# Patient Record
Sex: FEMALE | Race: WHITE | Employment: FULL TIME | URBAN - NONMETROPOLITAN AREA
[De-identification: names, ages, dates, MRNs, and addresses within clinical notes are randomized per-mention and may not be internally consistent; named-entity substitution may affect disease eponyms.]

---

## 2017-01-31 LAB
EXTERNAL ABO GROUPING: NORMAL
EXTERNAL RH FACTOR: POSITIVE

## 2017-02-06 LAB
EXTERNAL ANTIBODY SCREEN: NEGATIVE
EXTERNAL HEPATITIS B SURFACE ANTIGEN: NEGATIVE
EXTERNAL HERPES PCR: NORMAL
EXTERNAL RUBELLA QUALITATIVE: NORMAL
HIV1 AB SPEC QL IA.RAPID: NEGATIVE

## 2017-05-22 ENCOUNTER — OFFICE VISIT (OUTPATIENT)
Dept: UROLOGY | Age: 27
End: 2017-05-22
Payer: COMMERCIAL

## 2017-05-22 VITALS
HEART RATE: 103 BPM | HEIGHT: 67 IN | BODY MASS INDEX: 30.13 KG/M2 | TEMPERATURE: 97.9 F | OXYGEN SATURATION: 100 % | WEIGHT: 192 LBS | SYSTOLIC BLOOD PRESSURE: 124 MMHG | DIASTOLIC BLOOD PRESSURE: 64 MMHG

## 2017-05-22 DIAGNOSIS — N20.0 KIDNEY STONE: Primary | ICD-10-CM

## 2017-05-22 LAB
BILIRUBIN, POC: NORMAL
BLOOD URINE, POC: NORMAL
CLARITY, POC: CLEAR
COLOR, POC: YELLOW
GLUCOSE URINE, POC: NORMAL
KETONES, POC: NORMAL
LEUKOCYTE EST, POC: NORMAL
NITRITE, POC: NORMAL
PH, POC: 7
PROTEIN, POC: NORMAL
SPECIFIC GRAVITY, POC: 1.02
UROBILINOGEN, POC: 1

## 2017-05-22 PROCEDURE — 81002 URINALYSIS NONAUTO W/O SCOPE: CPT | Performed by: UROLOGY

## 2017-05-22 PROCEDURE — 99213 OFFICE O/P EST LOW 20 MIN: CPT | Performed by: UROLOGY

## 2017-05-22 ASSESSMENT — ENCOUNTER SYMPTOMS
DOUBLE VISION: 0
VOMITING: 0
BACK PAIN: 0
COUGH: 0
BLURRED VISION: 0
ABDOMINAL PAIN: 0
HEMOPTYSIS: 0
NAUSEA: 0

## 2017-07-09 ENCOUNTER — HOSPITAL ENCOUNTER (OUTPATIENT)
Facility: HOSPITAL | Age: 27
Discharge: HOME | End: 2017-07-09
Attending: OBSTETRICS & GYNECOLOGY | Admitting: OBSTETRICS & GYNECOLOGY

## 2017-07-09 DIAGNOSIS — R69 DIAGNOSIS UNKNOWN: ICD-10-CM

## 2017-07-09 PROCEDURE — G0378 HOSPITAL OBSERVATION PER HR: HCPCS

## 2017-07-09 PROCEDURE — 96374 THER/PROPH/DIAG INJ IV PUSH: CPT

## 2017-07-09 PROCEDURE — 25010000002 FERUMOXYTOL 510 MG/17ML SOLUTION 510 MG VIAL: Performed by: OBSTETRICS & GYNECOLOGY

## 2017-07-09 RX ORDER — DIPHENHYDRAMINE HCL 25 MG
25 CAPSULE ORAL ONCE
Status: CANCELLED | OUTPATIENT
Start: 2017-07-09

## 2017-07-09 RX ORDER — SODIUM CHLORIDE 9 MG/ML
250 INJECTION, SOLUTION INTRAVENOUS ONCE
Status: CANCELLED | OUTPATIENT
Start: 2017-07-09

## 2017-07-09 RX ORDER — DIPHENHYDRAMINE HCL 25 MG
25 CAPSULE ORAL ONCE
Status: DISCONTINUED | OUTPATIENT
Start: 2017-07-09 | End: 2017-07-09 | Stop reason: HOSPADM

## 2017-07-09 RX ORDER — SODIUM CHLORIDE 9 MG/ML
250 INJECTION, SOLUTION INTRAVENOUS ONCE
Status: DISCONTINUED | OUTPATIENT
Start: 2017-07-09 | End: 2017-07-09 | Stop reason: HOSPADM

## 2017-07-09 RX ADMIN — FERUMOXYTOL 510 MG: 510 INJECTION INTRAVENOUS at 16:20

## 2017-07-12 ENCOUNTER — HOSPITAL ENCOUNTER (OUTPATIENT)
Facility: HOSPITAL | Age: 27
Discharge: HOME | End: 2017-07-12
Attending: OBSTETRICS & GYNECOLOGY | Admitting: OBSTETRICS & GYNECOLOGY

## 2017-07-12 PROCEDURE — 25010000002 FERUMOXYTOL 510 MG/17ML SOLUTION 510 MG VIAL: Performed by: OBSTETRICS & GYNECOLOGY

## 2017-07-12 PROCEDURE — G0378 HOSPITAL OBSERVATION PER HR: HCPCS

## 2017-07-12 PROCEDURE — 96374 THER/PROPH/DIAG INJ IV PUSH: CPT

## 2017-07-12 RX ORDER — PRENATAL VIT NO.126/IRON/FOLIC 28MG-0.8MG
1 TABLET ORAL DAILY
COMMUNITY
End: 2022-05-31 | Stop reason: ALTCHOICE

## 2017-07-12 RX ORDER — FERUMOXYTOL 510 MG/17ML
510 INJECTION INTRAVENOUS ONCE
Status: DISCONTINUED | OUTPATIENT
Start: 2017-07-12 | End: 2017-07-12

## 2017-07-12 RX ORDER — DIPHENHYDRAMINE HCL 25 MG
25 CAPSULE ORAL ONCE
Status: CANCELLED | OUTPATIENT
Start: 2017-07-16

## 2017-07-12 RX ORDER — SODIUM CHLORIDE 9 MG/ML
250 INJECTION, SOLUTION INTRAVENOUS ONCE
Status: CANCELLED | OUTPATIENT
Start: 2017-07-16

## 2017-07-12 RX ADMIN — FERUMOXYTOL 510 MG: 510 INJECTION INTRAVENOUS at 15:30

## 2017-07-13 PROBLEM — D64.9 ANEMIA: Status: ACTIVE | Noted: 2017-07-13

## 2017-08-07 ENCOUNTER — HOSPITAL ENCOUNTER (OUTPATIENT)
Facility: HOSPITAL | Age: 27
Setting detail: OBSERVATION
Discharge: HOME | End: 2017-08-07
Attending: OBSTETRICS & GYNECOLOGY | Admitting: OBSTETRICS & GYNECOLOGY

## 2017-08-07 VITALS
SYSTOLIC BLOOD PRESSURE: 128 MMHG | DIASTOLIC BLOOD PRESSURE: 61 MMHG | RESPIRATION RATE: 14 BRPM | HEART RATE: 75 BPM | TEMPERATURE: 97.2 F

## 2017-08-07 PROBLEM — O36.8390 NON-REASSURING ELECTRONIC FETAL MONITORING TRACING: Status: ACTIVE | Noted: 2017-08-07

## 2017-08-07 LAB — FIBRONECTIN FETAL VAG QL: NEGATIVE

## 2017-08-07 PROCEDURE — 82731 ASSAY OF FETAL FIBRONECTIN: CPT | Performed by: OBSTETRICS & GYNECOLOGY

## 2017-08-07 PROCEDURE — G0378 HOSPITAL OBSERVATION PER HR: HCPCS

## 2017-08-07 PROCEDURE — G0463 HOSPITAL OUTPT CLINIC VISIT: HCPCS | Performed by: ADVANCED PRACTICE MIDWIFE

## 2017-08-07 RX ORDER — OMEPRAZOLE 20 MG/1
20 CAPSULE, DELAYED RELEASE ORAL DAILY
COMMUNITY
End: 2022-05-31

## 2017-08-07 RX ORDER — ACETAMINOPHEN 325 MG/1
650 TABLET ORAL EVERY 4 HOURS PRN
Status: CANCELLED | OUTPATIENT
Start: 2017-08-07

## 2017-08-07 RX ORDER — ACETAMINOPHEN 325 MG/1
650 TABLET ORAL EVERY 4 HOURS PRN
Status: DISCONTINUED | OUTPATIENT
Start: 2017-08-07 | End: 2017-08-07 | Stop reason: HOSPADM

## 2017-08-07 RX ORDER — FERROUS SULFATE 325(65) MG
325 TABLET ORAL
COMMUNITY
End: 2022-07-29

## 2017-08-24 LAB — EXTERNAL GROUP B STREP ANTIGEN: NEGATIVE

## 2017-09-22 ENCOUNTER — HOSPITAL ENCOUNTER (OUTPATIENT)
Facility: HOSPITAL | Age: 27
Setting detail: OBSERVATION
Discharge: HOME | End: 2017-09-22
Attending: OBSTETRICS & GYNECOLOGY | Admitting: OBSTETRICS & GYNECOLOGY

## 2017-09-22 VITALS
DIASTOLIC BLOOD PRESSURE: 88 MMHG | OXYGEN SATURATION: 99 % | WEIGHT: 209.2 LBS | HEART RATE: 96 BPM | RESPIRATION RATE: 18 BRPM | TEMPERATURE: 99.1 F | HEIGHT: 67 IN | SYSTOLIC BLOOD PRESSURE: 136 MMHG | BODY MASS INDEX: 32.83 KG/M2

## 2017-09-22 PROCEDURE — G0378 HOSPITAL OBSERVATION PER HR: HCPCS

## 2017-09-25 ENCOUNTER — HOSPITAL ENCOUNTER (OUTPATIENT)
Facility: HOSPITAL | Age: 27
Setting detail: OBSERVATION
Discharge: HOME | End: 2017-09-25
Attending: OBSTETRICS & GYNECOLOGY | Admitting: OBSTETRICS & GYNECOLOGY

## 2017-09-25 VITALS
HEIGHT: 67 IN | BODY MASS INDEX: 32.72 KG/M2 | TEMPERATURE: 99.5 F | DIASTOLIC BLOOD PRESSURE: 76 MMHG | RESPIRATION RATE: 18 BRPM | HEART RATE: 98 BPM | WEIGHT: 208.5 LBS | SYSTOLIC BLOOD PRESSURE: 129 MMHG

## 2017-09-25 PROBLEM — Z34.90 PREGNANCY: Status: ACTIVE | Noted: 2017-09-25

## 2017-09-25 LAB — A1 MICROGLOB PLACENTAL VAG QL: NEGATIVE

## 2017-09-25 PROCEDURE — G0378 HOSPITAL OBSERVATION PER HR: HCPCS

## 2017-09-25 PROCEDURE — 84112 EVAL AMNIOTIC FLUID PROTEIN: CPT | Performed by: OBSTETRICS & GYNECOLOGY

## 2017-09-25 PROCEDURE — G0463 HOSPITAL OUTPT CLINIC VISIT: HCPCS

## 2017-09-25 RX ORDER — VALACYCLOVIR HYDROCHLORIDE 500 MG/1
500 TABLET, FILM COATED ORAL 2 TIMES DAILY
Status: ON HOLD | COMMUNITY
End: 2017-10-02

## 2017-09-29 RX ORDER — SODIUM CHLORIDE 0.9 % (FLUSH) 0.9 %
1-10 SYRINGE (ML) INJECTION AS NEEDED
Status: CANCELLED | OUTPATIENT
Start: 2017-10-02

## 2017-09-29 RX ORDER — PROMETHAZINE HYDROCHLORIDE 25 MG/1
12.5 TABLET ORAL EVERY 6 HOURS PRN
Status: CANCELLED | OUTPATIENT
Start: 2017-10-02

## 2017-09-29 RX ORDER — PROMETHAZINE HYDROCHLORIDE 12.5 MG/1
12.5 SUPPOSITORY RECTAL EVERY 6 HOURS PRN
Status: CANCELLED | OUTPATIENT
Start: 2017-10-02

## 2017-09-29 RX ORDER — IBUPROFEN 800 MG/1
800 TABLET ORAL EVERY 8 HOURS PRN
Status: CANCELLED | OUTPATIENT
Start: 2017-10-02

## 2017-09-29 RX ORDER — CARBOPROST TROMETHAMINE 250 UG/ML
250 INJECTION, SOLUTION INTRAMUSCULAR AS NEEDED
Status: CANCELLED | OUTPATIENT
Start: 2017-10-02

## 2017-09-29 RX ORDER — MISOPROSTOL 200 UG/1
800 TABLET ORAL AS NEEDED
Status: CANCELLED | OUTPATIENT
Start: 2017-10-02

## 2017-09-29 RX ORDER — PROMETHAZINE HYDROCHLORIDE 25 MG/ML
12.5 INJECTION, SOLUTION INTRAMUSCULAR; INTRAVENOUS EVERY 6 HOURS PRN
Status: CANCELLED | OUTPATIENT
Start: 2017-10-02

## 2017-09-29 RX ORDER — METHYLERGONOVINE MALEATE 0.2 MG/ML
200 INJECTION INTRAVENOUS ONCE AS NEEDED
Status: CANCELLED | OUTPATIENT
Start: 2017-10-02

## 2017-09-29 RX ORDER — ACETAMINOPHEN 325 MG/1
650 TABLET ORAL EVERY 4 HOURS PRN
Status: CANCELLED | OUTPATIENT
Start: 2017-10-02

## 2017-09-29 RX ORDER — TERBUTALINE SULFATE 1 MG/ML
0.25 INJECTION, SOLUTION SUBCUTANEOUS AS NEEDED
Status: CANCELLED | OUTPATIENT
Start: 2017-10-02

## 2017-10-02 ENCOUNTER — ANESTHESIA EVENT (OUTPATIENT)
Dept: LABOR AND DELIVERY | Facility: HOSPITAL | Age: 27
End: 2017-10-02

## 2017-10-02 ENCOUNTER — ANESTHESIA (OUTPATIENT)
Dept: LABOR AND DELIVERY | Facility: HOSPITAL | Age: 27
End: 2017-10-02

## 2017-10-02 ENCOUNTER — HOSPITAL ENCOUNTER (OUTPATIENT)
Dept: LABOR AND DELIVERY | Facility: HOSPITAL | Age: 27
Discharge: HOME | End: 2017-10-02

## 2017-10-02 ENCOUNTER — HOSPITAL ENCOUNTER (INPATIENT)
Facility: HOSPITAL | Age: 27
LOS: 2 days | Discharge: HOME | End: 2017-10-04
Attending: OBSTETRICS & GYNECOLOGY | Admitting: OBSTETRICS & GYNECOLOGY

## 2017-10-02 PROBLEM — R69 DIAGNOSIS UNKNOWN: Status: RESOLVED | Noted: 2017-07-09 | Resolved: 2017-10-02

## 2017-10-02 PROBLEM — Z34.90 PREGNANCY: Status: RESOLVED | Noted: 2017-09-25 | Resolved: 2017-10-02

## 2017-10-02 PROBLEM — Z37.9 NORMAL LABOR: Status: ACTIVE | Noted: 2017-10-02

## 2017-10-02 PROBLEM — Z37.9 NORMAL LABOR: Status: RESOLVED | Noted: 2017-10-02 | Resolved: 2017-10-02

## 2017-10-02 PROBLEM — O36.8390 NON-REASSURING ELECTRONIC FETAL MONITORING TRACING: Status: RESOLVED | Noted: 2017-08-07 | Resolved: 2017-10-02

## 2017-10-02 PROBLEM — D64.9 ANEMIA: Status: RESOLVED | Noted: 2017-07-13 | Resolved: 2017-10-02

## 2017-10-02 LAB
ABO GROUP BLD: NORMAL
BLD GP AB SCN SERPL QL: NEGATIVE
DEPRECATED RDW RBC AUTO: 49.1 FL (ref 40–54)
EOSINOPHIL # BLD MANUAL: 0.23 10*3/MM3 (ref 0–0.7)
EOSINOPHIL NFR BLD MANUAL: 2 % (ref 0–4)
ERYTHROCYTE [DISTWIDTH] IN BLOOD BY AUTOMATED COUNT: 15.9 % (ref 12–15)
HCT VFR BLD AUTO: 37.5 % (ref 37–47)
HGB BLD-MCNC: 12.7 G/DL (ref 12–16)
LYMPHOCYTES # BLD MANUAL: 2.46 10*3/MM3 (ref 0.72–4.86)
LYMPHOCYTES NFR BLD MANUAL: 21 % (ref 15–45)
LYMPHOCYTES NFR BLD MANUAL: 5 % (ref 4–12)
MCH RBC QN AUTO: 29 PG (ref 28–32)
MCHC RBC AUTO-ENTMCNC: 33.9 G/DL (ref 33–36)
MCV RBC AUTO: 85.6 FL (ref 82–98)
MONOCYTES # BLD AUTO: 0.59 10*3/MM3 (ref 0.19–1.3)
NEUTROPHILS # BLD AUTO: 8.31 10*3/MM3 (ref 1.87–8.4)
NEUTROPHILS NFR BLD MANUAL: 68 % (ref 39–78)
NEUTS BAND NFR BLD MANUAL: 3 % (ref 0–10)
PLAT MORPH BLD: NORMAL
PLATELET # BLD AUTO: 210 10*3/MM3 (ref 130–400)
PMV BLD AUTO: 11.3 FL (ref 6–12)
RBC # BLD AUTO: 4.38 10*6/MM3 (ref 4.2–5.4)
RBC MORPH BLD: NORMAL
RH BLD: POSITIVE
VARIANT LYMPHS NFR BLD MANUAL: 1 % (ref 0–5)
WBC MORPH BLD: NORMAL
WBC NRBC COR # BLD: 11.71 10*3/MM3 (ref 4.8–10.8)

## 2017-10-02 PROCEDURE — 25010000002 METHYLERGONOVINE MALEATE PER 0.2 MG: Performed by: NURSE ANESTHETIST, CERTIFIED REGISTERED

## 2017-10-02 PROCEDURE — 85007 BL SMEAR W/DIFF WBC COUNT: CPT | Performed by: OBSTETRICS & GYNECOLOGY

## 2017-10-02 PROCEDURE — 25010000002 PROMETHAZINE PER 50 MG: Performed by: OBSTETRICS & GYNECOLOGY

## 2017-10-02 PROCEDURE — 25010000002 CEFEPIME: Performed by: OBSTETRICS & GYNECOLOGY

## 2017-10-02 PROCEDURE — 25010000002 SUCCINYLCHOLINE PER 20 MG: Performed by: NURSE ANESTHETIST, CERTIFIED REGISTERED

## 2017-10-02 PROCEDURE — 25010000003 CEFAZOLIN PER 500 MG: Performed by: OBSTETRICS & GYNECOLOGY

## 2017-10-02 PROCEDURE — 94799 UNLISTED PULMONARY SVC/PX: CPT

## 2017-10-02 PROCEDURE — 36415 COLL VENOUS BLD VENIPUNCTURE: CPT | Performed by: OBSTETRICS & GYNECOLOGY

## 2017-10-02 PROCEDURE — 25010000002 DEXAMETHASONE PER 1 MG: Performed by: NURSE ANESTHETIST, CERTIFIED REGISTERED

## 2017-10-02 PROCEDURE — 86900 BLOOD TYPING SEROLOGIC ABO: CPT | Performed by: OBSTETRICS & GYNECOLOGY

## 2017-10-02 PROCEDURE — 25010000002 MIDAZOLAM PER 1 MG: Performed by: NURSE ANESTHETIST, CERTIFIED REGISTERED

## 2017-10-02 PROCEDURE — 25010000002 MORPHINE PER 10 MG: Performed by: OBSTETRICS & GYNECOLOGY

## 2017-10-02 PROCEDURE — 25010000002 KETOROLAC TROMETHAMINE PER 15 MG: Performed by: OBSTETRICS & GYNECOLOGY

## 2017-10-02 PROCEDURE — 25010000002 FENTANYL CITRATE (PF) 250 MCG/5ML SOLUTION: Performed by: NURSE ANESTHETIST, CERTIFIED REGISTERED

## 2017-10-02 PROCEDURE — 86901 BLOOD TYPING SEROLOGIC RH(D): CPT | Performed by: OBSTETRICS & GYNECOLOGY

## 2017-10-02 PROCEDURE — 25010000002 HYDROMORPHONE PER 4 MG: Performed by: NURSE ANESTHETIST, CERTIFIED REGISTERED

## 2017-10-02 PROCEDURE — 51703 INSERT BLADDER CATH COMPLEX: CPT

## 2017-10-02 PROCEDURE — 88307 TISSUE EXAM BY PATHOLOGIST: CPT | Performed by: OBSTETRICS & GYNECOLOGY

## 2017-10-02 PROCEDURE — 86850 RBC ANTIBODY SCREEN: CPT | Performed by: OBSTETRICS & GYNECOLOGY

## 2017-10-02 PROCEDURE — 85027 COMPLETE CBC AUTOMATED: CPT | Performed by: OBSTETRICS & GYNECOLOGY

## 2017-10-02 PROCEDURE — 25010000002 PROPOFOL 10 MG/ML EMULSION: Performed by: NURSE ANESTHETIST, CERTIFIED REGISTERED

## 2017-10-02 PROCEDURE — 25010000002 ONDANSETRON PER 1 MG: Performed by: NURSE ANESTHETIST, CERTIFIED REGISTERED

## 2017-10-02 PROCEDURE — C1765 ADHESION BARRIER: HCPCS | Performed by: OBSTETRICS & GYNECOLOGY

## 2017-10-02 RX ORDER — OXYTOCIN/RINGER'S LACTATE 20/1000 ML
PLASTIC BAG, INJECTION (ML) INTRAVENOUS AS NEEDED
Status: DISCONTINUED | OUTPATIENT
Start: 2017-10-02 | End: 2017-10-02 | Stop reason: SURG

## 2017-10-02 RX ORDER — PROMETHAZINE HYDROCHLORIDE 25 MG/ML
12.5 INJECTION, SOLUTION INTRAMUSCULAR; INTRAVENOUS EVERY 6 HOURS PRN
Status: DISCONTINUED | OUTPATIENT
Start: 2017-10-02 | End: 2017-10-02 | Stop reason: HOSPADM

## 2017-10-02 RX ORDER — MORPHINE SULFATE 2 MG/ML
2 INJECTION, SOLUTION INTRAMUSCULAR; INTRAVENOUS
Status: DISCONTINUED | OUTPATIENT
Start: 2017-10-02 | End: 2017-10-02 | Stop reason: HOSPADM

## 2017-10-02 RX ORDER — FAMOTIDINE 20 MG/1
TABLET, FILM COATED ORAL
Status: DISPENSED
Start: 2017-10-02 | End: 2017-10-03

## 2017-10-02 RX ORDER — METHYLERGONOVINE MALEATE 0.2 MG/ML
200 INJECTION INTRAVENOUS ONCE AS NEEDED
Status: DISCONTINUED | OUTPATIENT
Start: 2017-10-02 | End: 2017-10-02 | Stop reason: SDUPTHER

## 2017-10-02 RX ORDER — ONDANSETRON 2 MG/ML
4 INJECTION INTRAMUSCULAR; INTRAVENOUS EVERY 6 HOURS PRN
Status: DISCONTINUED | OUTPATIENT
Start: 2017-10-02 | End: 2017-10-02 | Stop reason: HOSPADM

## 2017-10-02 RX ORDER — ONDANSETRON 4 MG/1
4 TABLET, ORALLY DISINTEGRATING ORAL EVERY 6 HOURS PRN
Status: DISCONTINUED | OUTPATIENT
Start: 2017-10-02 | End: 2017-10-04 | Stop reason: HOSPADM

## 2017-10-02 RX ORDER — ONDANSETRON 2 MG/ML
4 INJECTION INTRAMUSCULAR; INTRAVENOUS EVERY 6 HOURS PRN
Status: DISCONTINUED | OUTPATIENT
Start: 2017-10-02 | End: 2017-10-04 | Stop reason: HOSPADM

## 2017-10-02 RX ORDER — CARBOPROST TROMETHAMINE 250 UG/ML
250 INJECTION, SOLUTION INTRAMUSCULAR
Status: DISCONTINUED | OUTPATIENT
Start: 2017-10-02 | End: 2017-10-02 | Stop reason: SDUPTHER

## 2017-10-02 RX ORDER — ONDANSETRON 4 MG/1
4 TABLET, FILM COATED ORAL EVERY 6 HOURS PRN
Status: DISCONTINUED | OUTPATIENT
Start: 2017-10-02 | End: 2017-10-02 | Stop reason: HOSPADM

## 2017-10-02 RX ORDER — MISOPROSTOL 200 UG/1
800 TABLET ORAL AS NEEDED
Status: DISCONTINUED | OUTPATIENT
Start: 2017-10-02 | End: 2017-10-04 | Stop reason: HOSPADM

## 2017-10-02 RX ORDER — PROMETHAZINE HYDROCHLORIDE 12.5 MG/1
12.5 SUPPOSITORY RECTAL EVERY 6 HOURS PRN
Status: DISCONTINUED | OUTPATIENT
Start: 2017-10-02 | End: 2017-10-02 | Stop reason: HOSPADM

## 2017-10-02 RX ORDER — PROMETHAZINE HYDROCHLORIDE 12.5 MG/1
12.5 SUPPOSITORY RECTAL EVERY 6 HOURS PRN
Status: DISCONTINUED | OUTPATIENT
Start: 2017-10-02 | End: 2017-10-04 | Stop reason: HOSPADM

## 2017-10-02 RX ORDER — LIDOCAINE HYDROCHLORIDE 20 MG/ML
INJECTION, SOLUTION INFILTRATION; PERINEURAL AS NEEDED
Status: DISCONTINUED | OUTPATIENT
Start: 2017-10-02 | End: 2017-10-02 | Stop reason: SURG

## 2017-10-02 RX ORDER — METHYLERGONOVINE MALEATE 0.2 MG/ML
200 INJECTION INTRAVENOUS AS NEEDED
Status: DISCONTINUED | OUTPATIENT
Start: 2017-10-02 | End: 2017-10-02 | Stop reason: HOSPADM

## 2017-10-02 RX ORDER — SODIUM CHLORIDE, SODIUM LACTATE, POTASSIUM CHLORIDE, CALCIUM CHLORIDE 600; 310; 30; 20 MG/100ML; MG/100ML; MG/100ML; MG/100ML
125 INJECTION, SOLUTION INTRAVENOUS CONTINUOUS
Status: DISCONTINUED | OUTPATIENT
Start: 2017-10-02 | End: 2017-10-02 | Stop reason: SDUPTHER

## 2017-10-02 RX ORDER — CARBOPROST TROMETHAMINE 250 UG/ML
250 INJECTION, SOLUTION INTRAMUSCULAR AS NEEDED
Status: DISCONTINUED | OUTPATIENT
Start: 2017-10-02 | End: 2017-10-04 | Stop reason: HOSPADM

## 2017-10-02 RX ORDER — MEPERIDINE HYDROCHLORIDE 50 MG/ML
50 INJECTION INTRAMUSCULAR; INTRAVENOUS; SUBCUTANEOUS ONCE
Status: COMPLETED | OUTPATIENT
Start: 2017-10-02 | End: 2017-10-02

## 2017-10-02 RX ORDER — HYDROCODONE BITARTRATE AND ACETAMINOPHEN 5; 325 MG/1; MG/1
1 TABLET ORAL EVERY 4 HOURS PRN
Status: DISCONTINUED | OUTPATIENT
Start: 2017-10-02 | End: 2017-10-02 | Stop reason: HOSPADM

## 2017-10-02 RX ORDER — FAMOTIDINE 10 MG/ML
20 INJECTION, SOLUTION INTRAVENOUS ONCE AS NEEDED
Status: DISCONTINUED | OUTPATIENT
Start: 2017-10-02 | End: 2017-10-03 | Stop reason: HOSPADM

## 2017-10-02 RX ORDER — TRISODIUM CITRATE DIHYDRATE AND CITRIC ACID MONOHYDRATE 500; 334 MG/5ML; MG/5ML
15 SOLUTION ORAL ONCE
Status: DISCONTINUED | OUTPATIENT
Start: 2017-10-02 | End: 2017-10-03 | Stop reason: HOSPADM

## 2017-10-02 RX ORDER — BISACODYL 5 MG/1
10 TABLET, DELAYED RELEASE ORAL DAILY PRN
Status: DISCONTINUED | OUTPATIENT
Start: 2017-10-02 | End: 2017-10-04 | Stop reason: HOSPADM

## 2017-10-02 RX ORDER — ONDANSETRON 4 MG/1
4 TABLET, ORALLY DISINTEGRATING ORAL EVERY 6 HOURS PRN
Status: DISCONTINUED | OUTPATIENT
Start: 2017-10-02 | End: 2017-10-02 | Stop reason: HOSPADM

## 2017-10-02 RX ORDER — OXYTOCIN/0.9 % SODIUM CHLORIDE 30/500 ML
2-30 PLASTIC BAG, INJECTION (ML) INTRAVENOUS
Status: DISCONTINUED | OUTPATIENT
Start: 2017-10-02 | End: 2017-10-02

## 2017-10-02 RX ORDER — MORPHINE SULFATE IN 0.9 % NACL 50 MG/50ML
PATIENT CONTROLLED ANALGESIA SYRINGE INTRAVENOUS CONTINUOUS
Status: DISCONTINUED | OUTPATIENT
Start: 2017-10-02 | End: 2017-10-04 | Stop reason: HOSPADM

## 2017-10-02 RX ORDER — PROMETHAZINE HYDROCHLORIDE 25 MG/1
25 TABLET ORAL EVERY 6 HOURS PRN
Status: DISCONTINUED | OUTPATIENT
Start: 2017-10-02 | End: 2017-10-04 | Stop reason: HOSPADM

## 2017-10-02 RX ORDER — BUTORPHANOL TARTRATE 1 MG/ML
1 INJECTION, SOLUTION INTRAMUSCULAR; INTRAVENOUS
Status: DISCONTINUED | OUTPATIENT
Start: 2017-10-02 | End: 2017-10-02 | Stop reason: HOSPADM

## 2017-10-02 RX ORDER — SODIUM CHLORIDE 0.9 % (FLUSH) 0.9 %
1-10 SYRINGE (ML) INJECTION AS NEEDED
Status: DISCONTINUED | OUTPATIENT
Start: 2017-10-02 | End: 2017-10-02 | Stop reason: SDUPTHER

## 2017-10-02 RX ORDER — MORPHINE SULFATE 2 MG/ML
2 INJECTION, SOLUTION INTRAMUSCULAR; INTRAVENOUS
Status: DISCONTINUED | OUTPATIENT
Start: 2017-10-02 | End: 2017-10-02 | Stop reason: SDUPTHER

## 2017-10-02 RX ORDER — POLYETHYLENE GLYCOL 3350 17 G/17G
17 POWDER, FOR SOLUTION ORAL DAILY
Status: DISCONTINUED | OUTPATIENT
Start: 2017-10-02 | End: 2017-10-04 | Stop reason: HOSPADM

## 2017-10-02 RX ORDER — IBUPROFEN 800 MG/1
800 TABLET ORAL EVERY 8 HOURS PRN
Status: DISCONTINUED | OUTPATIENT
Start: 2017-10-02 | End: 2017-10-04 | Stop reason: HOSPADM

## 2017-10-02 RX ORDER — SUCCINYLCHOLINE CHLORIDE 20 MG/ML
INJECTION INTRAMUSCULAR; INTRAVENOUS AS NEEDED
Status: DISCONTINUED | OUTPATIENT
Start: 2017-10-02 | End: 2017-10-02 | Stop reason: SURG

## 2017-10-02 RX ORDER — ACETAMINOPHEN 325 MG/1
650 TABLET ORAL EVERY 4 HOURS PRN
Status: DISCONTINUED | OUTPATIENT
Start: 2017-10-02 | End: 2017-10-02 | Stop reason: HOSPADM

## 2017-10-02 RX ORDER — PROMETHAZINE HYDROCHLORIDE 25 MG/ML
12.5 INJECTION, SOLUTION INTRAMUSCULAR; INTRAVENOUS EVERY 6 HOURS PRN
Status: DISCONTINUED | OUTPATIENT
Start: 2017-10-02 | End: 2017-10-03 | Stop reason: HOSPADM

## 2017-10-02 RX ORDER — SENNA AND DOCUSATE SODIUM 50; 8.6 MG/1; MG/1
1 TABLET, FILM COATED ORAL 2 TIMES DAILY
Status: DISCONTINUED | OUTPATIENT
Start: 2017-10-02 | End: 2017-10-04 | Stop reason: HOSPADM

## 2017-10-02 RX ORDER — PROMETHAZINE HYDROCHLORIDE 25 MG/ML
25 INJECTION, SOLUTION INTRAMUSCULAR; INTRAVENOUS ONCE
Status: COMPLETED | OUTPATIENT
Start: 2017-10-02 | End: 2017-10-02

## 2017-10-02 RX ORDER — MISOPROSTOL 200 UG/1
800 TABLET ORAL ONCE AS NEEDED
Status: DISCONTINUED | OUTPATIENT
Start: 2017-10-02 | End: 2017-10-02 | Stop reason: SDUPTHER

## 2017-10-02 RX ORDER — DEXAMETHASONE SODIUM PHOSPHATE 4 MG/ML
INJECTION, SOLUTION INTRA-ARTICULAR; INTRALESIONAL; INTRAMUSCULAR; INTRAVENOUS; SOFT TISSUE AS NEEDED
Status: DISCONTINUED | OUTPATIENT
Start: 2017-10-02 | End: 2017-10-02 | Stop reason: SURG

## 2017-10-02 RX ORDER — PRENATAL VIT/IRON FUM/FOLIC AC 27MG-0.8MG
1 TABLET ORAL DAILY
Status: DISCONTINUED | OUTPATIENT
Start: 2017-10-02 | End: 2017-10-04 | Stop reason: HOSPADM

## 2017-10-02 RX ORDER — PROMETHAZINE HYDROCHLORIDE 12.5 MG/1
12.5 SUPPOSITORY RECTAL EVERY 6 HOURS PRN
Status: DISCONTINUED | OUTPATIENT
Start: 2017-10-02 | End: 2017-10-03 | Stop reason: HOSPADM

## 2017-10-02 RX ORDER — OXYTOCIN/RINGER'S LACTATE 20/1000 ML
999 PLASTIC BAG, INJECTION (ML) INTRAVENOUS ONCE
Status: DISCONTINUED | OUTPATIENT
Start: 2017-10-02 | End: 2017-10-02

## 2017-10-02 RX ORDER — SODIUM CHLORIDE 0.9 % (FLUSH) 0.9 %
1-10 SYRINGE (ML) INJECTION AS NEEDED
Status: DISCONTINUED | OUTPATIENT
Start: 2017-10-02 | End: 2017-10-02 | Stop reason: HOSPADM

## 2017-10-02 RX ORDER — PROPOFOL 10 MG/ML
VIAL (ML) INTRAVENOUS AS NEEDED
Status: DISCONTINUED | OUTPATIENT
Start: 2017-10-02 | End: 2017-10-02 | Stop reason: SURG

## 2017-10-02 RX ORDER — ONDANSETRON 4 MG/1
4 TABLET, FILM COATED ORAL EVERY 6 HOURS PRN
Status: DISCONTINUED | OUTPATIENT
Start: 2017-10-02 | End: 2017-10-04 | Stop reason: HOSPADM

## 2017-10-02 RX ORDER — PROMETHAZINE HYDROCHLORIDE 25 MG/1
12.5 TABLET ORAL EVERY 6 HOURS PRN
Status: DISCONTINUED | OUTPATIENT
Start: 2017-10-02 | End: 2017-10-03 | Stop reason: HOSPADM

## 2017-10-02 RX ORDER — PROMETHAZINE HYDROCHLORIDE 25 MG/ML
12.5 INJECTION, SOLUTION INTRAMUSCULAR; INTRAVENOUS EVERY 6 HOURS PRN
Status: DISCONTINUED | OUTPATIENT
Start: 2017-10-02 | End: 2017-10-04 | Stop reason: HOSPADM

## 2017-10-02 RX ORDER — IBUPROFEN 800 MG/1
800 TABLET ORAL EVERY 8 HOURS PRN
Status: DISCONTINUED | OUTPATIENT
Start: 2017-10-02 | End: 2017-10-02 | Stop reason: SDUPTHER

## 2017-10-02 RX ORDER — BISACODYL 10 MG
10 SUPPOSITORY, RECTAL RECTAL DAILY PRN
Status: DISCONTINUED | OUTPATIENT
Start: 2017-10-02 | End: 2017-10-04 | Stop reason: HOSPADM

## 2017-10-02 RX ORDER — OXYCODONE HYDROCHLORIDE AND ACETAMINOPHEN 5; 325 MG/1; MG/1
2 TABLET ORAL EVERY 6 HOURS PRN
Status: DISCONTINUED | OUTPATIENT
Start: 2017-10-02 | End: 2017-10-04 | Stop reason: HOSPADM

## 2017-10-02 RX ORDER — DOCUSATE SODIUM 100 MG/1
100 CAPSULE, LIQUID FILLED ORAL 2 TIMES DAILY PRN
Status: DISCONTINUED | OUTPATIENT
Start: 2017-10-02 | End: 2017-10-04 | Stop reason: HOSPADM

## 2017-10-02 RX ORDER — FENTANYL CITRATE 50 UG/ML
INJECTION, SOLUTION INTRAMUSCULAR; INTRAVENOUS AS NEEDED
Status: DISCONTINUED | OUTPATIENT
Start: 2017-10-02 | End: 2017-10-02 | Stop reason: SURG

## 2017-10-02 RX ORDER — NALOXONE HCL 0.4 MG/ML
0.1 VIAL (ML) INJECTION
Status: DISCONTINUED | OUTPATIENT
Start: 2017-10-02 | End: 2017-10-04 | Stop reason: HOSPADM

## 2017-10-02 RX ORDER — KETOROLAC TROMETHAMINE 30 MG/ML
30 INJECTION, SOLUTION INTRAMUSCULAR; INTRAVENOUS EVERY 6 HOURS
Status: COMPLETED | OUTPATIENT
Start: 2017-10-02 | End: 2017-10-03

## 2017-10-02 RX ORDER — MISOPROSTOL 200 UG/1
800 TABLET ORAL AS NEEDED
Status: DISCONTINUED | OUTPATIENT
Start: 2017-10-02 | End: 2017-10-02 | Stop reason: HOSPADM

## 2017-10-02 RX ORDER — OXYTOCIN/RINGER'S LACTATE 20/1000 ML
125 PLASTIC BAG, INJECTION (ML) INTRAVENOUS CONTINUOUS PRN
Status: DISCONTINUED | OUTPATIENT
Start: 2017-10-02 | End: 2017-10-02

## 2017-10-02 RX ORDER — MIDAZOLAM HYDROCHLORIDE 1 MG/ML
INJECTION INTRAMUSCULAR; INTRAVENOUS AS NEEDED
Status: DISCONTINUED | OUTPATIENT
Start: 2017-10-02 | End: 2017-10-02 | Stop reason: SURG

## 2017-10-02 RX ORDER — ONDANSETRON 4 MG/1
4 TABLET, FILM COATED ORAL EVERY 6 HOURS PRN
Status: DISCONTINUED | OUTPATIENT
Start: 2017-10-02 | End: 2017-10-02 | Stop reason: SDUPTHER

## 2017-10-02 RX ORDER — SODIUM CHLORIDE 0.9 % (FLUSH) 0.9 %
1-10 SYRINGE (ML) INJECTION AS NEEDED
Status: DISCONTINUED | OUTPATIENT
Start: 2017-10-02 | End: 2017-10-04 | Stop reason: HOSPADM

## 2017-10-02 RX ORDER — CARBOPROST TROMETHAMINE 250 UG/ML
250 INJECTION, SOLUTION INTRAMUSCULAR AS NEEDED
Status: DISCONTINUED | OUTPATIENT
Start: 2017-10-02 | End: 2017-10-02 | Stop reason: HOSPADM

## 2017-10-02 RX ORDER — OXYCODONE HYDROCHLORIDE AND ACETAMINOPHEN 5; 325 MG/1; MG/1
1 TABLET ORAL EVERY 6 HOURS PRN
Status: DISCONTINUED | OUTPATIENT
Start: 2017-10-02 | End: 2017-10-04 | Stop reason: HOSPADM

## 2017-10-02 RX ORDER — ROCURONIUM BROMIDE 10 MG/ML
INJECTION, SOLUTION INTRAVENOUS AS NEEDED
Status: DISCONTINUED | OUTPATIENT
Start: 2017-10-02 | End: 2017-10-02 | Stop reason: SURG

## 2017-10-02 RX ORDER — SODIUM CHLORIDE, SODIUM LACTATE, POTASSIUM CHLORIDE, CALCIUM CHLORIDE 600; 310; 30; 20 MG/100ML; MG/100ML; MG/100ML; MG/100ML
125 INJECTION, SOLUTION INTRAVENOUS CONTINUOUS
Status: DISCONTINUED | OUTPATIENT
Start: 2017-10-02 | End: 2017-10-02

## 2017-10-02 RX ORDER — ONDANSETRON 2 MG/ML
INJECTION INTRAMUSCULAR; INTRAVENOUS AS NEEDED
Status: DISCONTINUED | OUTPATIENT
Start: 2017-10-02 | End: 2017-10-02 | Stop reason: SURG

## 2017-10-02 RX ORDER — ONDANSETRON 2 MG/ML
4 INJECTION INTRAMUSCULAR; INTRAVENOUS EVERY 6 HOURS PRN
Status: DISCONTINUED | OUTPATIENT
Start: 2017-10-02 | End: 2017-10-02 | Stop reason: SDUPTHER

## 2017-10-02 RX ORDER — SODIUM CHLORIDE, SODIUM LACTATE, POTASSIUM CHLORIDE, CALCIUM CHLORIDE 600; 310; 30; 20 MG/100ML; MG/100ML; MG/100ML; MG/100ML
125 INJECTION, SOLUTION INTRAVENOUS CONTINUOUS
Status: DISCONTINUED | OUTPATIENT
Start: 2017-10-02 | End: 2017-10-04 | Stop reason: HOSPADM

## 2017-10-02 RX ORDER — PROMETHAZINE HYDROCHLORIDE 25 MG/1
12.5 TABLET ORAL EVERY 6 HOURS PRN
Status: DISCONTINUED | OUTPATIENT
Start: 2017-10-02 | End: 2017-10-02 | Stop reason: HOSPADM

## 2017-10-02 RX ORDER — METHYLERGONOVINE MALEATE 0.2 MG/ML
INJECTION INTRAVENOUS AS NEEDED
Status: DISCONTINUED | OUTPATIENT
Start: 2017-10-02 | End: 2017-10-02 | Stop reason: SURG

## 2017-10-02 RX ORDER — TERBUTALINE SULFATE 1 MG/ML
0.25 INJECTION, SOLUTION SUBCUTANEOUS AS NEEDED
Status: DISCONTINUED | OUTPATIENT
Start: 2017-10-02 | End: 2017-10-02 | Stop reason: HOSPADM

## 2017-10-02 RX ORDER — METHYLERGONOVINE MALEATE 0.2 MG/ML
200 INJECTION INTRAVENOUS
Status: ACTIVE | OUTPATIENT
Start: 2017-10-02 | End: 2017-10-03

## 2017-10-02 RX ORDER — ONDANSETRON 4 MG/1
4 TABLET, ORALLY DISINTEGRATING ORAL EVERY 6 HOURS PRN
Status: DISCONTINUED | OUTPATIENT
Start: 2017-10-02 | End: 2017-10-02 | Stop reason: SDUPTHER

## 2017-10-02 RX ORDER — LIDOCAINE HYDROCHLORIDE 10 MG/ML
5 INJECTION, SOLUTION INFILTRATION; PERINEURAL AS NEEDED
Status: DISCONTINUED | OUTPATIENT
Start: 2017-10-02 | End: 2017-10-02 | Stop reason: HOSPADM

## 2017-10-02 RX ORDER — IBUPROFEN 800 MG/1
800 TABLET ORAL EVERY 8 HOURS PRN
Status: DISCONTINUED | OUTPATIENT
Start: 2017-10-02 | End: 2017-10-02 | Stop reason: HOSPADM

## 2017-10-02 RX ADMIN — SODIUM CHLORIDE, POTASSIUM CHLORIDE, SODIUM LACTATE AND CALCIUM CHLORIDE: 600; 310; 30; 20 INJECTION, SOLUTION INTRAVENOUS at 13:09

## 2017-10-02 RX ADMIN — SUCCINYLCHOLINE CHLORIDE 120 MG: 20 INJECTION, SOLUTION INTRAMUSCULAR; INTRAVENOUS at 13:15

## 2017-10-02 RX ADMIN — Medication 30 UNITS: at 13:20

## 2017-10-02 RX ADMIN — POLYETHYLENE GLYCOL 3350 17 G: 17 POWDER, FOR SOLUTION ORAL at 18:23

## 2017-10-02 RX ADMIN — MEPERIDINE HYDROCHLORIDE 50 MG: 50 INJECTION, SOLUTION INTRAMUSCULAR; INTRAVENOUS; SUBCUTANEOUS at 14:35

## 2017-10-02 RX ADMIN — ROCURONIUM BROMIDE 5 MG: 10 INJECTION INTRAVENOUS at 13:15

## 2017-10-02 RX ADMIN — CEFEPIME 2 G: 2 INJECTION, POWDER, FOR SOLUTION INTRAVENOUS at 17:10

## 2017-10-02 RX ADMIN — KETOROLAC TROMETHAMINE 30 MG: 30 INJECTION, SOLUTION INTRAMUSCULAR at 16:47

## 2017-10-02 RX ADMIN — PROPOFOL 200 MG: 10 INJECTION, EMULSION INTRAVENOUS at 13:15

## 2017-10-02 RX ADMIN — Medication: at 16:46

## 2017-10-02 RX ADMIN — SODIUM CHLORIDE, POTASSIUM CHLORIDE, SODIUM LACTATE AND CALCIUM CHLORIDE 1000 ML: 600; 310; 30; 20 INJECTION, SOLUTION INTRAVENOUS at 05:15

## 2017-10-02 RX ADMIN — MIDAZOLAM HYDROCHLORIDE 5 MG: 1 INJECTION, SOLUTION INTRAMUSCULAR; INTRAVENOUS at 13:20

## 2017-10-02 RX ADMIN — DOCUSATE SODIUM -SENNOSIDES 1 TABLET: 50; 8.6 TABLET, COATED ORAL at 18:22

## 2017-10-02 RX ADMIN — METRONIDAZOLE 500 MG: 500 INJECTION, SOLUTION INTRAVENOUS at 18:21

## 2017-10-02 RX ADMIN — PROPOFOL 100 MG: 10 INJECTION, EMULSION INTRAVENOUS at 13:19

## 2017-10-02 RX ADMIN — PROMETHAZINE HYDROCHLORIDE 25 MG: 25 INJECTION, SOLUTION INTRAMUSCULAR; INTRAVENOUS at 14:34

## 2017-10-02 RX ADMIN — LIDOCAINE HYDROCHLORIDE 80 MG: 20 INJECTION, SOLUTION INFILTRATION; PERINEURAL at 13:15

## 2017-10-02 RX ADMIN — HYDROMORPHONE HYDROCHLORIDE 1 MG: 1 INJECTION, SOLUTION INTRAMUSCULAR; INTRAVENOUS; SUBCUTANEOUS at 13:51

## 2017-10-02 RX ADMIN — FENTANYL CITRATE 250 MCG: 50 INJECTION INTRAMUSCULAR; INTRAVENOUS at 13:20

## 2017-10-02 RX ADMIN — ONDANSETRON HYDROCHLORIDE 4 MG: 2 SOLUTION INTRAMUSCULAR; INTRAVENOUS at 13:26

## 2017-10-02 RX ADMIN — Medication 30 UNITS: at 13:31

## 2017-10-02 RX ADMIN — DEXAMETHASONE SODIUM PHOSPHATE 4 MG: 4 INJECTION, SOLUTION INTRAMUSCULAR; INTRAVENOUS at 13:26

## 2017-10-02 RX ADMIN — CEFAZOLIN SODIUM 2 G: 2 SOLUTION INTRAVENOUS at 13:14

## 2017-10-02 RX ADMIN — PRENATAL VIT W/ FE FUMARATE-FA TAB 27-0.8 MG 1 TABLET: 27-0.8 TAB at 18:22

## 2017-10-02 RX ADMIN — OXYTOCIN-SODIUM CHLORIDE 0.9% IV SOLN 30 UNIT/500ML 2 MILLI-UNITS/MIN: 30-0.9/5 SOLUTION at 05:59

## 2017-10-02 RX ADMIN — SODIUM CHLORIDE, POTASSIUM CHLORIDE, SODIUM LACTATE AND CALCIUM CHLORIDE: 600; 310; 30; 20 INJECTION, SOLUTION INTRAVENOUS at 13:30

## 2017-10-02 RX ADMIN — METHYLERGONOVINE MALEATE 200 MCG: 0.2 INJECTION INTRAVENOUS at 13:24

## 2017-10-02 NOTE — OP NOTE
Section Procedure Note    Indications: non-reassuring fetal status    Pre-operative Diagnosis: 40 week 5 day pregnancy.    Post-operative Diagnosis: same    Planned Procedure:  Primary  Section    Surgeon: Cecily Riggs MD     Assistants: None    Anesthesia: General endotracheal anesthesia    ASA Class: 2      Procedure Details     The risks, benefits, indications and alternatives of the procedure were reviewed with the patient and informed consent was obtained. The patient was taken to the Operating Room with an IV running. She was placed in the dorsal supine position, with a leftward tilt, then prepped and draped in the usual sterile fashion. Spinal anesthesia was then administrated without difficulty. A Pfannenstiel skin incision was made approximately 2cm above the symphysis pubis and extended down sharply to the rectus fascia. The fascia was then nicked in the midline and extended laterally. The muscles of the anterior abdominal wall were . The fascia was then grasped and the fascial incision extended laterally via superior and inferior traction. The peritoneum was next identified, and entered bluntly with the digits. The peritoneal incision was also extended via traction, taking care to note the position of the bladder. Next a bladder blade was inserted. A vesicouterine incision was made with the metzembaum scissors, and a bladder flap gently created. The bladder blade was then reinserted. Next, the uterine incision was made with the scapel, in transverse, elliptical fashion. The uterine incision was extended laterally via traction. The bladder blade was then removed, and the fetal vertex delivered through the uterine incision without difficulty. Nares and mouth were suctioned on the sterile field. The remainder of the infant was then delivered, and infant passed to the awaiting respiratory therapist. Next, the placenta was delivered manually, taking care to remove all  membranes. The uterus was then exteriorized, and cleared of all clot and debris. The uterine incision was closed with 0-Vicryl in running, locking fashion. Excellent hemostasis was noted. Copious irrigation of the posterior cul-de-sac was performed with warm, sterile saline. The uterus was returned to the abdominal cavity, and the gutters cleared of all clot and debris. Interceed was placed over the uterine incision and over the body of the uterus to prevent future adhesions. The fascia was then closed with 0-Vicryl in running fashion. Next interceed was layered over the fascia to prevent future adhesions. The skin was subsequently closed with stratifix suture in 2 layers. The skin was then dressed with a mepilex gauze.    Findings:  VFI  In cephalic, OA position, meconium stained,yes; Number of nuchal loops present:  1    nuchal cord. Manual extraction of  Normal placenta and cord. Apgars         APGARS  One minute Five minutes Ten minutes Fifteen minutes Twenty minutes   Skin color: 0   1             Heart rate: 2   2             Grimace: 2   2              Muscle tone: 2   2              Breathin   2              Totals: 8   9              , Weight: 7 lb 13.6 oz (3560 g)  Length: 21  in. NICU/Nursery Present.    Estimated Blood Loss:  600ml           Drains: 100ml UOP           Total IV Fluids: 1600ml           Specimens: Placenta, cord blood, cord gases           Implants: Interceed x 3           Complications:  None; patient tolerated the procedure well.           Disposition: PACU - hemodynamically stable.           Condition: stable    Attending Attestation: I performed the procedure.    Cecily Riggs MD  10/3/2017  5:43 PM

## 2017-10-02 NOTE — PLAN OF CARE
Problem: Patient Care Overview (Adult)  Goal: Plan of Care Review  Outcome: Ongoing (interventions implemented as appropriate)    10/02/17 1270   Coping/Psychosocial Response Interventions   Plan Of Care Reviewed With patient   Patient Care Overview   Progress improving   Outcome Evaluation   Outcome Summary/Follow up Plan VSS WNL, fundus fml U1, small lochia. breastfeeding and bonding with baby       Goal: Adult Individualization and Mutuality  Outcome: Ongoing (interventions implemented as appropriate)  Goal: Discharge Needs Assessment  Outcome: Ongoing (interventions implemented as appropriate)

## 2017-10-02 NOTE — ANESTHESIA PROCEDURE NOTES
Airway  Airway not difficult    General Information and Staff    Patient location during procedure: OR  CRNA: OZIEL BRENNAN    Indications and Patient Condition  Indications for airway management: airway protection    Preoxygenated: yes  Mask difficulty assessment: 0 - not attempted    Final Airway Details  Final airway type: endotracheal airway      Successful airway: ETT  Cuffed: yes   Successful intubation technique: direct laryngoscopy  Facilitating devices/methods: cricoid pressure  Blade: Lai  Blade size: #2  ETT size: 7.0 mm  Cormack-Lehane Classification: grade I - full view of glottis  Placement verified by: chest auscultation and capnometry   Cuff volume (mL): 8  Number of attempts at approach: 1

## 2017-10-03 LAB
BASOPHILS # BLD AUTO: 0.01 10*3/MM3 (ref 0–0.2)
BASOPHILS NFR BLD AUTO: 0.1 % (ref 0–2)
DEPRECATED RDW RBC AUTO: 49.4 FL (ref 40–54)
EOSINOPHIL # BLD AUTO: 0.02 10*3/MM3 (ref 0–0.7)
EOSINOPHIL NFR BLD AUTO: 0.1 % (ref 0–4)
ERYTHROCYTE [DISTWIDTH] IN BLOOD BY AUTOMATED COUNT: 15.8 % (ref 12–15)
HCT VFR BLD AUTO: 31.8 % (ref 37–47)
HGB BLD-MCNC: 10.6 G/DL (ref 12–16)
IMM GRANULOCYTES # BLD: 0.04 10*3/MM3 (ref 0–0.03)
IMM GRANULOCYTES NFR BLD: 0.3 % (ref 0–5)
LYMPHOCYTES # BLD AUTO: 1.73 10*3/MM3 (ref 0.72–4.86)
LYMPHOCYTES NFR BLD AUTO: 11.7 % (ref 15–45)
MCH RBC QN AUTO: 28.6 PG (ref 28–32)
MCHC RBC AUTO-ENTMCNC: 33.3 G/DL (ref 33–36)
MCV RBC AUTO: 85.9 FL (ref 82–98)
MONOCYTES # BLD AUTO: 0.8 10*3/MM3 (ref 0.19–1.3)
MONOCYTES NFR BLD AUTO: 5.4 % (ref 4–12)
NEUTROPHILS # BLD AUTO: 12.15 10*3/MM3 (ref 1.87–8.4)
NEUTROPHILS NFR BLD AUTO: 82.4 % (ref 39–78)
PLATELET # BLD AUTO: 198 10*3/MM3 (ref 130–400)
PMV BLD AUTO: 11.6 FL (ref 6–12)
RBC # BLD AUTO: 3.7 10*6/MM3 (ref 4.2–5.4)
WBC NRBC COR # BLD: 14.75 10*3/MM3 (ref 4.8–10.8)

## 2017-10-03 PROCEDURE — 25010000002 KETOROLAC TROMETHAMINE PER 15 MG: Performed by: OBSTETRICS & GYNECOLOGY

## 2017-10-03 PROCEDURE — 25010000002 CEFEPIME: Performed by: OBSTETRICS & GYNECOLOGY

## 2017-10-03 PROCEDURE — 90715 TDAP VACCINE 7 YRS/> IM: CPT | Performed by: OBSTETRICS & GYNECOLOGY

## 2017-10-03 PROCEDURE — 90471 IMMUNIZATION ADMIN: CPT | Performed by: OBSTETRICS & GYNECOLOGY

## 2017-10-03 PROCEDURE — 85025 COMPLETE CBC W/AUTO DIFF WBC: CPT | Performed by: OBSTETRICS & GYNECOLOGY

## 2017-10-03 PROCEDURE — 25010000002 TDAP 5-2.5-18.5 LF-MCG/0.5 SUSPENSION: Performed by: OBSTETRICS & GYNECOLOGY

## 2017-10-03 RX ADMIN — IBUPROFEN 800 MG: 800 TABLET, FILM COATED ORAL at 17:31

## 2017-10-03 RX ADMIN — CEFEPIME 2 G: 2 INJECTION, POWDER, FOR SOLUTION INTRAVENOUS at 17:32

## 2017-10-03 RX ADMIN — PRENATAL VIT W/ FE FUMARATE-FA TAB 27-0.8 MG 1 TABLET: 27-0.8 TAB at 08:54

## 2017-10-03 RX ADMIN — KETOROLAC TROMETHAMINE 30 MG: 30 INJECTION, SOLUTION INTRAMUSCULAR at 00:00

## 2017-10-03 RX ADMIN — CEFEPIME 2 G: 2 INJECTION, POWDER, FOR SOLUTION INTRAVENOUS at 03:00

## 2017-10-03 RX ADMIN — KETOROLAC TROMETHAMINE 30 MG: 30 INJECTION, SOLUTION INTRAMUSCULAR at 10:19

## 2017-10-03 RX ADMIN — DOCUSATE SODIUM -SENNOSIDES 1 TABLET: 50; 8.6 TABLET, COATED ORAL at 08:54

## 2017-10-03 RX ADMIN — POLYETHYLENE GLYCOL 3350 17 G: 17 POWDER, FOR SOLUTION ORAL at 08:54

## 2017-10-03 RX ADMIN — SODIUM CHLORIDE, POTASSIUM CHLORIDE, SODIUM LACTATE AND CALCIUM CHLORIDE 125 ML/HR: 600; 310; 30; 20 INJECTION, SOLUTION INTRAVENOUS at 00:36

## 2017-10-03 RX ADMIN — OXYCODONE HYDROCHLORIDE AND ACETAMINOPHEN 2 TABLET: 5; 325 TABLET ORAL at 07:15

## 2017-10-03 RX ADMIN — TETANUS TOXOID, REDUCED DIPHTHERIA TOXOID AND ACELLULAR PERTUSSIS VACCINE, ADSORBED 0.5 ML: 5; 2.5; 8; 8; 2.5 SUSPENSION INTRAMUSCULAR at 15:02

## 2017-10-03 RX ADMIN — DOCUSATE SODIUM -SENNOSIDES 1 TABLET: 50; 8.6 TABLET, COATED ORAL at 17:31

## 2017-10-03 RX ADMIN — METRONIDAZOLE 500 MG: 500 INJECTION, SOLUTION INTRAVENOUS at 00:00

## 2017-10-03 RX ADMIN — MEASLES, MUMPS, AND RUBELLA VIRUS VACCINE LIVE 0.5 ML: 1000; 12500; 1000 INJECTION, POWDER, LYOPHILIZED, FOR SUSPENSION SUBCUTANEOUS at 15:01

## 2017-10-03 RX ADMIN — CEFEPIME 2 G: 2 INJECTION, POWDER, FOR SOLUTION INTRAVENOUS at 10:19

## 2017-10-03 RX ADMIN — OXYCODONE HYDROCHLORIDE AND ACETAMINOPHEN 2 TABLET: 5; 325 TABLET ORAL at 12:52

## 2017-10-03 RX ADMIN — KETOROLAC TROMETHAMINE 30 MG: 30 INJECTION, SOLUTION INTRAMUSCULAR at 06:00

## 2017-10-03 RX ADMIN — METRONIDAZOLE 500 MG: 500 INJECTION, SOLUTION INTRAVENOUS at 06:00

## 2017-10-03 RX ADMIN — METRONIDAZOLE 500 MG: 500 INJECTION, SOLUTION INTRAVENOUS at 11:27

## 2017-10-03 RX ADMIN — OXYCODONE HYDROCHLORIDE AND ACETAMINOPHEN 2 TABLET: 5; 325 TABLET ORAL at 18:42

## 2017-10-03 NOTE — PLAN OF CARE
Problem: Patient Care Overview (Adult)  Goal: Plan of Care Review  Outcome: Ongoing (interventions implemented as appropriate)    10/03/17 0200   Coping/Psychosocial Response Interventions   Plan Of Care Reviewed With patient   Patient Care Overview   Progress improving   Outcome Evaluation   Outcome Summary/Follow up Plan VSS, FF/ML/UU with scant lochia, BF well & bonding with infant, mepilex dressing dry clean & intact, pain well controlled with PCA       Goal: Adult Individualization and Mutuality  Outcome: Ongoing (interventions implemented as appropriate)  Goal: Discharge Needs Assessment  Outcome: Ongoing (interventions implemented as appropriate)    Problem: Fall Risk  (Adult,Obstetrics,Pediatric)  Goal: Identify Related Risk Factors and Signs and Symptoms  Outcome: Ongoing (interventions implemented as appropriate)  Goal: Absence of Maternal Fall  Outcome: Ongoing (interventions implemented as appropriate)  Goal: Absence of [place] Fall/Drop  Outcome: Ongoing (interventions implemented as appropriate)    Problem: Breastfeeding (Adult,NICU,[place],Obstetrics,Pediatric)  Goal: Signs and Symptoms of Listed Potential Problems Will be Absent or Manageable (Breastfeeding)  Outcome: Ongoing (interventions implemented as appropriate)    Problem: Postpartum ( Delivery) (Adult)  Goal: Signs and Symptoms of Listed Potential Problems Will be Absent or Manageable (Postpartum)  Outcome: Ongoing (interventions implemented as appropriate)

## 2017-10-03 NOTE — ANESTHESIA POSTPROCEDURE EVALUATION
"Patient: Adina FISHER    Procedure Summary     Date Anesthesia Start Anesthesia Stop Room / Location    10/02/17 [id number]  [facility] /  [facility]       Procedure Diagnosis Surgeon Provider     SECTION PRIMARY (N/A Abdomen) No diagnosis on file. MD Elijah Ramírez CRNA          Anesthesia Type: general  Last vitals  BP        Temp        Pulse       Resp        SpO2          Post Anesthesia Care and Evaluation    Patient location during evaluation: PACU  Patient participation: complete - patient participated  Level of consciousness: awake and alert  Pain management: adequate  Airway patency: patent  Anesthetic complications: No anesthetic complications    Cardiovascular status: acceptable  Respiratory status: acceptable  Hydration status: acceptable  Post Neuraxial Block status: Motor and sensory function returned to baseline  Comments: Blood pressure 126/81, pulse 81, temperature 98.2 °F (36.8 °C), temperature source Temporal Artery , resp. rate 16, height 67\" (170.2 cm), weight 214 lb (97.1 kg), SpO2 98 %, currently breastfeeding.        "

## 2017-10-03 NOTE — ANESTHESIA PREPROCEDURE EVALUATION
Anesthesia Evaluation     Patient summary reviewed   NPO Solid Status: > 4 hours  NPO Liquid Status: > 4 hours     Airway   Mallampati: II  no difficulty expected  Dental      Pulmonary - negative pulmonary ROS and normal exam   Cardiovascular - normal exam        Neuro/Psych- negative ROS  GI/Hepatic/Renal/Endo    (+) obesity,      Musculoskeletal     Abdominal  - normal exam   Substance History      OB/GYN    (+) Pregnant,         Other - negative ROS           Phys Exam Other: History of repair cleft palate and lip                              Anesthesia Plan    ASA 2 - emergent     general   total IV anesthesia and Rapid sequence  intravenous induction   Anesthetic plan and risks discussed with patient and spouse/significant other.    Plan discussed with CRNA.

## 2017-10-03 NOTE — PLAN OF CARE
Problem: Patient Care Overview (Adult)  Goal: Plan of Care Review  Outcome: Ongoing (interventions implemented as appropriate)    10/03/17 1643   Coping/Psychosocial Response Interventions   Plan Of Care Reviewed With patient   Patient Care Overview   Progress improving   Outcome Evaluation   Outcome Summary/Follow up Plan FFUUML, scant lochia, voiding, ALT incision with steritrips and mepilex dressing, CDI, pain level well controlled with po pain medication,  at bedside       Goal: Adult Individualization and Mutuality  Outcome: Ongoing (interventions implemented as appropriate)  Goal: Discharge Needs Assessment  Outcome: Ongoing (interventions implemented as appropriate)    Problem: Breastfeeding (Adult,NICU,,Obstetrics,Pediatric)  Goal: Signs and Symptoms of Listed Potential Problems Will be Absent or Manageable (Breastfeeding)  Outcome: Ongoing (interventions implemented as appropriate)    Problem: Postpartum ( Delivery) (Adult)  Goal: Signs and Symptoms of Listed Potential Problems Will be Absent or Manageable (Postpartum)  Outcome: Ongoing (interventions implemented as appropriate)

## 2017-10-03 NOTE — LACTATION NOTE
"This note was copied from a baby's chart.  Female infant, Kendall, delivered via  on 10/2/17 at 40/5 weeks gestation. Birth weight 7-13.6 (3560g), today's weight 7-12 (3515g), total weight loss -1.25%. Noted in charting since delivery are 4 voids, 4 stools, and 5 breastfeeding sessions. Mother reports infant is breastfeeding well. She states she latches well, with active sucking, and tugging felt. Nipples slightly tender. Encouragement provided. Gave and reviewed breastfeeding book (see education). Discussed supply/demand and encouraged mother to continue with frequent feedings (getting at least 8 every 24 hours). Offered to assist at this time. Mother declines stating, \"infant is not due to breastfeed yet.\" Placed number on white board and encouraged mother/father to call for assistance. Understanding verbalized. Questions denied.     Maternal Hx: , Anemia, Migraines  Prenatal Medications: PNV, Ferrous Sulfate, Omeprazole  Pump: Medela double electric for home use.     1314  Father called and requested assistance with feeding. He states infant is fussy and difficult to latch at this time. Undressed infant and demonstrated waking techniques. Blister noted to right nipple. Mother can easily express. Assisted mother with shaping of breast, rolling nipple down nose to chin, to acheive a deep latch. Infant latched well with deep jaw dropping sucks observed and swallowing heard. Mother denied pain with latch. Infant tucking top lip in at times. Demonstrated how to manually flip lip out. Encouraged mother to keep infant close, chin/cheeks against breast to prevent further damage to nipple. Education provided regarding nipple care.  Mother/father verbalized understanding. Further questions/concerns denied.  "

## 2017-10-03 NOTE — PROGRESS NOTES
"[facility]   Section Postpartum Delivery Progress Note    Subjective   Postpartum Day 1:  Delivery    The patient feels well.  Her pain is well controlled with prescribed pain medications.   She is ambulating well.  Patient describes her bleeding as moderate lochia.    Breastfeeding: without difficulty.    Objective     Vital Signs Range for the last 24 hours  Temperature: Temp:  [97.3 °F (36.3 °C)-98.3 °F (36.8 °C)] 97.3 °F (36.3 °C)   Temp Source: Temp src: Temporal Artery    BP: BP: (126-135)/(71-84) 131/71   Pulse: Heart Rate:  [78-95] 95   Respirations: Resp:  [16-18] 16   SPO2: SpO2:  [95 %-98 %] 95 %   O2 Amount (l/min):     O2 Devices O2 Device: room air   Weight:       Admit Height:  Height: 67\" (170.2 cm)      Physical Exam:  General:  no acute distresss.  Abdomen: Fundus: appropriate, firm, non tender  Extremities: normal, atraumatic, no cyanosis, and trace edema.   Incision: clean, dry & intact    Lab results reviewed:  Yes   Rubella:  No results found for: RUBELLAIGGIN Nurse Transcribed from prenatal record --  No components found for: EXTRUBELQUAL  Rh Status:    RH type   Date Value Ref Range Status   10/02/2017 Positive  Final     Immunizations:   Immunization History   Administered Date(s) Administered   • MMR 10/03/2017   • Tdap 10/03/2017       Assessment/Plan     Active Problems:    * No active hospital problems. *      Adina FISHER is Day 1  post-partum  , Low Transverse    .      Plan:  Continue current care.      Cecily Riggs MD  10/3/2017  5:44 PM  "

## 2017-10-04 VITALS
BODY MASS INDEX: 33.59 KG/M2 | OXYGEN SATURATION: 97 % | HEIGHT: 67 IN | WEIGHT: 214 LBS | TEMPERATURE: 97.2 F | DIASTOLIC BLOOD PRESSURE: 96 MMHG | SYSTOLIC BLOOD PRESSURE: 125 MMHG | RESPIRATION RATE: 16 BRPM | HEART RATE: 56 BPM

## 2017-10-04 PROCEDURE — G0008 ADMIN INFLUENZA VIRUS VAC: HCPCS | Performed by: OBSTETRICS & GYNECOLOGY

## 2017-10-04 PROCEDURE — 25010000002 INFLUENZA VAC SUBUNIT QUAD 0.5 ML SUSPENSION PREFILLED SYRINGE: Performed by: OBSTETRICS & GYNECOLOGY

## 2017-10-04 PROCEDURE — 90661 CCIIV3 VAC ABX FR 0.5 ML IM: CPT | Performed by: OBSTETRICS & GYNECOLOGY

## 2017-10-04 RX ORDER — OXYCODONE HYDROCHLORIDE AND ACETAMINOPHEN 5; 325 MG/1; MG/1
1 TABLET ORAL EVERY 6 HOURS PRN
Qty: 50 TABLET | Refills: 0 | Status: SHIPPED | OUTPATIENT
Start: 2017-10-04 | End: 2017-10-12

## 2017-10-04 RX ORDER — IBUPROFEN 800 MG/1
800 TABLET ORAL EVERY 8 HOURS PRN
Qty: 90 TABLET | Refills: 2 | Status: ON HOLD | OUTPATIENT
Start: 2017-10-04 | End: 2020-12-30

## 2017-10-04 RX ADMIN — DOCUSATE SODIUM -SENNOSIDES 1 TABLET: 50; 8.6 TABLET, COATED ORAL at 10:00

## 2017-10-04 RX ADMIN — A/SINGAPORE/GP1908/2015 IVR-180 (H1N1) (AN A/MICHIGAN/45/2015-LIKE VIRUS), A/SINGAPORE/GP2050/2015 (H3N2) (AN A/HONG KONG/4801/2014 - LIKE VIRUS), B/UTAH/9/2014 (A B/PHUKET/3073/2013-LIKE VIRUS), B/HONG KONG/259/2010 (A B/BRISBANE/60/08-LIKE VIRUS) 0.5 ML: 15; 15; 15; 15 INJECTION, SUSPENSION INTRAMUSCULAR at 10:01

## 2017-10-04 RX ADMIN — PRENATAL VIT W/ FE FUMARATE-FA TAB 27-0.8 MG 1 TABLET: 27-0.8 TAB at 10:00

## 2017-10-04 RX ADMIN — OXYCODONE HYDROCHLORIDE AND ACETAMINOPHEN 1 TABLET: 5; 325 TABLET ORAL at 11:02

## 2017-10-04 RX ADMIN — IBUPROFEN 800 MG: 800 TABLET, FILM COATED ORAL at 11:02

## 2017-10-04 RX ADMIN — IBUPROFEN 800 MG: 800 TABLET, FILM COATED ORAL at 02:40

## 2017-10-04 RX ADMIN — OXYCODONE HYDROCHLORIDE AND ACETAMINOPHEN 1 TABLET: 5; 325 TABLET ORAL at 02:40

## 2017-10-04 NOTE — PROGRESS NOTES
"[facility]   Section Postpartum Delivery Progress Note    Subjective   Postpartum Day 2:  Delivery    The patient feels well.  Her pain is well controlled with prescribed pain medications.   She is ambulating well.  Patient describes her bleeding as thin lochia.    Breastfeeding: without difficulty.    Objective     Vital Signs Range for the last 24 hours  Temperature: Temp:  [97.2 °F (36.2 °C)-97.6 °F (36.4 °C)] 97.2 °F (36.2 °C)   Temp Source: Temp src: Temporal Artery    BP: BP: (125-142)/(67-96) 125/96   Pulse: Heart Rate:  [56-95] 56   Respirations: Resp:  [16-18] 16   SPO2: SpO2:  [95 %-97 %] 97 %   O2 Amount (l/min):     O2 Devices O2 Device: room air   Weight:       Admit Height:  Height: 67\" (170.2 cm)      Physical Exam:  General:  no acute distresss.  Abdomen: Fundus: appropriate, firm, non tender  Extremities: normal, atraumatic, no cyanosis, and trace edema.   Incision: clean, dry & intact    Lab results reviewed:  Yes   Rubella:  No results found for: RUBELLAIGGIN Nurse Transcribed from prenatal record --  No components found for: EXTRUBELQUAL  Rh Status:    RH type   Date Value Ref Range Status   10/02/2017 Positive  Final     Immunizations:   Immunization History   Administered Date(s) Administered   • Influenza Quad Vaccine (Inpatient) 10/04/2017   • MMR 10/03/2017   • Tdap 10/03/2017       Assessment/Plan     Active Problems:    * No active hospital problems. *      Adina FISHER is Day 2  post-partum  , Low Transverse    .      Plan:  Discharge home with standard precautions and return to clinic in 4-6 weeks.      Cecily Riggs MD  10/4/2017  10:14 AM  "

## 2017-10-04 NOTE — LACTATION NOTE
This note was copied from a baby's chart.  Lactation consult completed.     2 day old female infant, Kendall, delivered via  on 10/2/17 at 40/5 weeks gestation. Birth weight 7-13.6 (3560g), today's weight 7-5.9 (3341g), total weight loss -6.15%. Noted in charting for past 24 hours are 2 voids, 3 stools, and 8 breastfeeding sessions. Mother/father both report infant  well through the night. She sates nipples are healing, intact, and not as tender today. Mother felt more confident with feedings. Encouragement provided. Discussed signs of milk, nutrition/fluid intake, nipple care, medications/contraceptives, hand expression, pumping, storing EBM, going back to work, engorgement, mastitis, adequate voids/stools for infant, and signs of satiety. Encouraged mother to continue feeding Kendall every 2-3 hours. Offered outpatient lactation appointment. Mother declines appointment at this time. Encouraged mother to call lactation with any questions/concersns. Mother/father verbalized understanding. Questions denied.     Pump: Mother has double electric pump for home use.

## 2017-10-04 NOTE — DISCHARGE SUMMARY
Discharge Summary    [facility]  Adina FISHER  : 1990  MRN: [id number]  CSN: [id number]    Date of Admission: 10/2/2017   Date of Discharge:  10/4/2017   Delivering Physician: Cecily Riggs        Admission Diagnosis: 1. Normal labor [O80, Z37.9]  2. Normal labor [O80, Z37.9]  3. Non-reassuring fetal heart rate, delivered, current hospitalization [O76]   Discharge Diagnosis: 1. Pregnancy at 40w5d - delivered       Procedures: 10/2/2017  - , Low Transverse       Hospital Course  Patient is a 27 y.o.  who at 40w5d had a  section.  Her postoperative course was without complications.  On POD #2 she was ready for discharge.  She had normal lochia and pain well controlled with oral medications.    Infant  female  fetus weighing 7 lb 13.6 oz (3560 g)   Apgars -  8  @ 1 minute /  9  @ 5 minutes.    Discharge labs  Lab Results   Component Value Date    WBC 14.75 (H) 10/03/2017    HGB 10.6 (L) 10/03/2017    HCT 31.8 (L) 10/03/2017     10/03/2017       Discharge Medications   Adina FISHER   Home Medication Instructions JOIE:[id number]    Printed on:10/04/17 1015   Medication Information                      ferrous sulfate 325 (65 FE) MG tablet  Take 325 mg by mouth 3 (Three) Times a Day With Meals.             omeprazole (priLOSEC) 20 MG capsule  Take 20 mg by mouth Daily.             Prenatal Vit-Fe Fumarate-FA (PRENATAL, CLASSIC, VITAMIN) 28-0.8 MG tablet tablet  Take 1 tablet by mouth Daily.                 Discharge Disposition    Condition on Discharge: good   Follow-up: 2 weeks with MD Cecily Ramírez MD  10/4/2017

## 2017-10-04 NOTE — PLAN OF CARE
Problem: Patient Care Overview (Adult)  Goal: Plan of Care Review  Outcome: Ongoing (interventions implemented as appropriate)    10/04/17 0323   Coping/Psychosocial Response Interventions   Plan Of Care Reviewed With patient;spouse   Patient Care Overview   Progress improving   Outcome Evaluation   Outcome Summary/Follow up Plan FFU1ML scant lochia, voiding and ambulating without difficulty, ALT incision with mepilex intact with no drainage, pain level well tolerated with PO meds, needs flu shot       Goal: Adult Individualization and Mutuality  Outcome: Ongoing (interventions implemented as appropriate)  Goal: Discharge Needs Assessment  Outcome: Ongoing (interventions implemented as appropriate)    Problem: Breastfeeding (Adult,NICU,,Obstetrics,Pediatric)  Goal: Signs and Symptoms of Listed Potential Problems Will be Absent or Manageable (Breastfeeding)  Outcome: Ongoing (interventions implemented as appropriate)    Problem: Postpartum ( Delivery) (Adult)  Goal: Signs and Symptoms of Listed Potential Problems Will be Absent or Manageable (Postpartum)  Outcome: Ongoing (interventions implemented as appropriate)

## 2017-10-10 LAB
CYTO UR: NORMAL
LAB AP CASE REPORT: NORMAL
LAB AP CLINICAL INFORMATION: NORMAL
Lab: NORMAL
PATH REPORT.FINAL DX SPEC: NORMAL
PATH REPORT.GROSS SPEC: NORMAL

## 2017-12-11 ENCOUNTER — TELEPHONE (OUTPATIENT)
Dept: OTHER | Facility: HOSPITAL | Age: 27
End: 2017-12-11

## 2017-12-11 NOTE — TELEPHONE ENCOUNTER
Infant, Andrew, girl, :  10/[birth year]/17, 2 months    Left msg regarding BFing status with offer of help if needed.

## 2018-03-20 ENCOUNTER — OFFICE VISIT (OUTPATIENT)
Dept: PRIMARY CARE CLINIC | Age: 28
End: 2018-03-20
Payer: COMMERCIAL

## 2018-03-20 VITALS
SYSTOLIC BLOOD PRESSURE: 120 MMHG | OXYGEN SATURATION: 99 % | BODY MASS INDEX: 29.03 KG/M2 | HEIGHT: 67 IN | WEIGHT: 185 LBS | TEMPERATURE: 97.2 F | HEART RATE: 90 BPM | DIASTOLIC BLOOD PRESSURE: 86 MMHG

## 2018-03-20 DIAGNOSIS — R05.9 COUGH: ICD-10-CM

## 2018-03-20 DIAGNOSIS — J01.00 ACUTE MAXILLARY SINUSITIS, RECURRENCE NOT SPECIFIED: Primary | ICD-10-CM

## 2018-03-20 DIAGNOSIS — R09.81 NASAL CONGESTION: ICD-10-CM

## 2018-03-20 PROCEDURE — 99213 OFFICE O/P EST LOW 20 MIN: CPT | Performed by: NURSE PRACTITIONER

## 2018-03-20 RX ORDER — FLUTICASONE PROPIONATE 50 MCG
2 SPRAY, SUSPENSION (ML) NASAL DAILY
Qty: 1 BOTTLE | Refills: 3 | Status: SHIPPED | OUTPATIENT
Start: 2018-03-20 | End: 2019-03-15

## 2018-03-20 RX ORDER — CEFDINIR 300 MG/1
300 CAPSULE ORAL 2 TIMES DAILY
Qty: 20 CAPSULE | Refills: 0 | Status: SHIPPED | OUTPATIENT
Start: 2018-03-20 | End: 2019-03-15 | Stop reason: SDUPTHER

## 2018-03-20 ASSESSMENT — ENCOUNTER SYMPTOMS
SORE THROAT: 1
SINUS PRESSURE: 1
WHEEZING: 0
VOMITING: 0
TROUBLE SWALLOWING: 0
VOICE CHANGE: 1
COUGH: 1
BLOOD IN STOOL: 0
CONSTIPATION: 0
SHORTNESS OF BREATH: 0
ABDOMINAL PAIN: 0
DIARRHEA: 0
EYE REDNESS: 0
RHINORRHEA: 1
EYE DISCHARGE: 0
NAUSEA: 1

## 2018-03-20 NOTE — PROGRESS NOTES
[address], [address] [place] [address] Rd  Phone [phone]   Fax [phone]      OFFICE VISIT: 3/20/2018    Marito Keith is a 24 y.o. female who presents today for her medical conditions/complaints as noted below. Marito Keith is c/o of Cough (swallows sputum- off and on for a month) and Nasal Congestion        HPI:     HPI   Patient presents c/o cough, sinus congestion (productive of yellow mucus), and hoarse voice. She states symptoms began approximately 1 month ago with \"allergy like\" runny nose and cough, but has since progressed. She denies any fever, shortness of breath, or wheezing. She has not been taking any medication for this issue as up until two weeks ago she had been breastfeeding. Notable on physical exam is a split heart sound, she denies any chest pain or edema. She has in the past experienced infrequent palpitations but has not had this for some time. Past Medical History:   Diagnosis Date    Dizziness     Headache(784.0)     Kidney stone     No periods     for 6-7 yrs.  while on this specific type of bcp      Past Surgical History:   Procedure Laterality Date    CLEFT PALATE REPAIR      12    CYSTOSCOPY Right 8/2/2016    CYSTOSCOPY URETEROSCOPY LASER performed by Curly Fisher MD at [place] N/A 8/2/2016    STENT INSERTION performed by Curly Fisher MD at [address]  2    KNEE SURGERY Right     2    LITHOTRIPSY N/A 8/2/2016    LITHOTRIPSY LASER performed by Curly Fisher MD at [facility] OR       Family History   Problem Relation Age of Onset    Other Mother      Throid Removal    High Blood Pressure Father     High Cholesterol Father        Social History   Substance Use Topics    Smoking status: Never Smoker    Smokeless tobacco: Never Used    Alcohol use 0.6 oz/week     1 Shots of liquor per week      Comment: 1 per month or less      Current Outpatient Prescriptions   Medication Sig Dispense Refill    cefdinir (OMNICEF) 300 MG

## 2019-03-15 ENCOUNTER — OFFICE VISIT (OUTPATIENT)
Dept: PRIMARY CARE CLINIC | Age: 29
End: 2019-03-15
Payer: COMMERCIAL

## 2019-03-15 VITALS
BODY MASS INDEX: 31.55 KG/M2 | TEMPERATURE: 97.1 F | DIASTOLIC BLOOD PRESSURE: 76 MMHG | WEIGHT: 201 LBS | OXYGEN SATURATION: 98 % | SYSTOLIC BLOOD PRESSURE: 120 MMHG | HEIGHT: 67 IN | HEART RATE: 113 BPM

## 2019-03-15 DIAGNOSIS — J01.00 ACUTE MAXILLARY SINUSITIS, RECURRENCE NOT SPECIFIED: ICD-10-CM

## 2019-03-15 DIAGNOSIS — R09.81 NASAL CONGESTION: ICD-10-CM

## 2019-03-15 DIAGNOSIS — H66.002 ACUTE SUPPURATIVE OTITIS MEDIA OF LEFT EAR WITHOUT SPONTANEOUS RUPTURE OF TYMPANIC MEMBRANE, RECURRENCE NOT SPECIFIED: Primary | ICD-10-CM

## 2019-03-15 PROCEDURE — 99213 OFFICE O/P EST LOW 20 MIN: CPT | Performed by: NURSE PRACTITIONER

## 2019-03-15 RX ORDER — FLUTICASONE PROPIONATE 50 MCG
2 SPRAY, SUSPENSION (ML) NASAL DAILY
Qty: 1 BOTTLE | Refills: 3 | Status: SHIPPED | OUTPATIENT
Start: 2019-03-15 | End: 2019-12-17

## 2019-03-15 RX ORDER — DEXTROMETHORPHAN HYDROBROMIDE AND PROMETHAZINE HYDROCHLORIDE 15; 6.25 MG/5ML; MG/5ML
5 SYRUP ORAL 4 TIMES DAILY PRN
Qty: 180 ML | Refills: 1 | Status: SHIPPED | OUTPATIENT
Start: 2019-03-15 | End: 2019-03-22

## 2019-03-15 RX ORDER — CEFDINIR 300 MG/1
300 CAPSULE ORAL 2 TIMES DAILY
Qty: 20 CAPSULE | Refills: 0 | Status: SHIPPED | OUTPATIENT
Start: 2019-03-15 | End: 2019-03-25

## 2019-03-15 ASSESSMENT — ENCOUNTER SYMPTOMS
ABDOMINAL PAIN: 0
WHEEZING: 0
EYE DISCHARGE: 0
EYE REDNESS: 0
SHORTNESS OF BREATH: 0
TROUBLE SWALLOWING: 0
SINUS PRESSURE: 1
BLOOD IN STOOL: 0
COUGH: 1
SORE THROAT: 1
SINUS PAIN: 1
RHINORRHEA: 1
CONSTIPATION: 0
DIARRHEA: 0

## 2019-03-15 ASSESSMENT — PATIENT HEALTH QUESTIONNAIRE - PHQ9
1. LITTLE INTEREST OR PLEASURE IN DOING THINGS: 0
SUM OF ALL RESPONSES TO PHQ QUESTIONS 1-9: 0
SUM OF ALL RESPONSES TO PHQ9 QUESTIONS 1 & 2: 0
2. FEELING DOWN, DEPRESSED OR HOPELESS: 0
SUM OF ALL RESPONSES TO PHQ QUESTIONS 1-9: 0

## 2019-12-17 ENCOUNTER — OFFICE VISIT (OUTPATIENT)
Dept: PRIMARY CARE CLINIC | Age: 29
End: 2019-12-17
Payer: COMMERCIAL

## 2019-12-17 VITALS
DIASTOLIC BLOOD PRESSURE: 70 MMHG | WEIGHT: 199 LBS | HEART RATE: 79 BPM | OXYGEN SATURATION: 97 % | HEIGHT: 67 IN | SYSTOLIC BLOOD PRESSURE: 132 MMHG | BODY MASS INDEX: 31.23 KG/M2 | TEMPERATURE: 97.2 F

## 2019-12-17 DIAGNOSIS — H69.82 DYSFUNCTION OF LEFT EUSTACHIAN TUBE: ICD-10-CM

## 2019-12-17 DIAGNOSIS — J01.90 ACUTE SINUSITIS, RECURRENCE NOT SPECIFIED, UNSPECIFIED LOCATION: Primary | ICD-10-CM

## 2019-12-17 PROCEDURE — 96372 THER/PROPH/DIAG INJ SC/IM: CPT | Performed by: NURSE PRACTITIONER

## 2019-12-17 PROCEDURE — 99213 OFFICE O/P EST LOW 20 MIN: CPT | Performed by: NURSE PRACTITIONER

## 2019-12-17 RX ORDER — CEFDINIR 300 MG/1
300 CAPSULE ORAL 2 TIMES DAILY
Qty: 20 CAPSULE | Refills: 0 | Status: SHIPPED | OUTPATIENT
Start: 2019-12-17 | End: 2019-12-27

## 2019-12-17 RX ORDER — FLUTICASONE PROPIONATE 50 MCG
2 SPRAY, SUSPENSION (ML) NASAL DAILY
Qty: 1 BOTTLE | Refills: 0 | Status: SHIPPED | OUTPATIENT
Start: 2019-12-17

## 2019-12-17 RX ORDER — TESTOSTERONE CYPIONATE 200 MG/ML
10 INJECTION INTRAMUSCULAR ONCE
Status: COMPLETED | OUTPATIENT
Start: 2019-12-17 | End: 2019-12-17

## 2019-12-17 RX ADMIN — TESTOSTERONE CYPIONATE 10 MG: 200 INJECTION INTRAMUSCULAR at 16:27

## 2019-12-17 ASSESSMENT — ENCOUNTER SYMPTOMS
VOMITING: 0
CONSTIPATION: 0
SHORTNESS OF BREATH: 0
RHINORRHEA: 0
TROUBLE SWALLOWING: 0
COUGH: 0
SORE THROAT: 0
SINUS PRESSURE: 0
DIARRHEA: 0
ABDOMINAL PAIN: 0
NAUSEA: 0

## 2020-12-21 ENCOUNTER — HOSPITAL ENCOUNTER (OUTPATIENT)
Facility: HOSPITAL | Age: 30
Setting detail: OBSERVATION
Discharge: HOME | End: 2020-12-21
Attending: OBSTETRICS & GYNECOLOGY | Admitting: OBSTETRICS & GYNECOLOGY

## 2020-12-21 VITALS
DIASTOLIC BLOOD PRESSURE: 74 MMHG | SYSTOLIC BLOOD PRESSURE: 113 MMHG | HEART RATE: 94 BPM | TEMPERATURE: 98.1 F | RESPIRATION RATE: 18 BRPM

## 2020-12-21 PROBLEM — Z34.90 PREGNANCY: Status: ACTIVE | Noted: 2020-12-21

## 2020-12-21 LAB
ALT SERPL W P-5'-P-CCNC: 13 U/L (ref 1–33)
AST SERPL-CCNC: 25 U/L (ref 1–32)
BASOPHILS # BLD AUTO: 0.01 10*3/MM3 (ref 0–0.2)
BASOPHILS NFR BLD AUTO: 0.1 % (ref 0–1.5)
DEPRECATED RDW RBC AUTO: 47.3 FL (ref 37–54)
EOSINOPHIL # BLD AUTO: 0.04 10*3/MM3 (ref 0–0.4)
EOSINOPHIL NFR BLD AUTO: 0.4 % (ref 0.3–6.2)
ERYTHROCYTE [DISTWIDTH] IN BLOOD BY AUTOMATED COUNT: 15.7 % (ref 12.3–15.4)
HCT VFR BLD AUTO: 33.1 % (ref 34–46.6)
HGB BLD-MCNC: 11 G/DL (ref 12–15.9)
IMM GRANULOCYTES # BLD AUTO: 0.05 10*3/MM3 (ref 0–0.05)
IMM GRANULOCYTES NFR BLD AUTO: 0.5 % (ref 0–0.5)
LDH SERPL-CCNC: 177 U/L (ref 135–214)
LYMPHOCYTES # BLD AUTO: 1.64 10*3/MM3 (ref 0.7–3.1)
LYMPHOCYTES NFR BLD AUTO: 16.6 % (ref 19.6–45.3)
MCH RBC QN AUTO: 27.4 PG (ref 26.6–33)
MCHC RBC AUTO-ENTMCNC: 33.2 G/DL (ref 31.5–35.7)
MCV RBC AUTO: 82.3 FL (ref 79–97)
MONOCYTES # BLD AUTO: 0.33 10*3/MM3 (ref 0.1–0.9)
MONOCYTES NFR BLD AUTO: 3.3 % (ref 5–12)
NEUTROPHILS NFR BLD AUTO: 7.81 10*3/MM3 (ref 1.7–7)
NEUTROPHILS NFR BLD AUTO: 79.1 % (ref 42.7–76)
NRBC BLD AUTO-RTO: 0 /100 WBC (ref 0–0.2)
PLATELET # BLD AUTO: 203 10*3/MM3 (ref 140–450)
PMV BLD AUTO: 11.4 FL (ref 6–12)
RBC # BLD AUTO: 4.02 10*6/MM3 (ref 3.77–5.28)
URATE SERPL-MCNC: 4.5 MG/DL (ref 2.4–5.7)
WBC # BLD AUTO: 9.88 10*3/MM3 (ref 3.4–10.8)

## 2020-12-21 PROCEDURE — 25010000002 INFLUENZA VAC SPLIT QUAD 0.5 ML SUSPENSION PREFILLED SYRINGE: Performed by: OBSTETRICS & GYNECOLOGY

## 2020-12-21 PROCEDURE — 84450 TRANSFERASE (AST) (SGOT): CPT | Performed by: OBSTETRICS & GYNECOLOGY

## 2020-12-21 PROCEDURE — 83615 LACTATE (LD) (LDH) ENZYME: CPT | Performed by: OBSTETRICS & GYNECOLOGY

## 2020-12-21 PROCEDURE — G0008 ADMIN INFLUENZA VIRUS VAC: HCPCS | Performed by: OBSTETRICS & GYNECOLOGY

## 2020-12-21 PROCEDURE — G0463 HOSPITAL OUTPT CLINIC VISIT: HCPCS

## 2020-12-21 PROCEDURE — 84460 ALANINE AMINO (ALT) (SGPT): CPT | Performed by: OBSTETRICS & GYNECOLOGY

## 2020-12-21 PROCEDURE — G0378 HOSPITAL OBSERVATION PER HR: HCPCS

## 2020-12-21 PROCEDURE — 90686 IIV4 VACC NO PRSV 0.5 ML IM: CPT | Performed by: OBSTETRICS & GYNECOLOGY

## 2020-12-21 PROCEDURE — 85025 COMPLETE CBC W/AUTO DIFF WBC: CPT | Performed by: OBSTETRICS & GYNECOLOGY

## 2020-12-21 PROCEDURE — 84550 ASSAY OF BLOOD/URIC ACID: CPT | Performed by: OBSTETRICS & GYNECOLOGY

## 2020-12-21 RX ORDER — VALACYCLOVIR HYDROCHLORIDE 500 MG/1
500 TABLET, FILM COATED ORAL DAILY
Status: ON HOLD | COMMUNITY
End: 2020-12-30

## 2020-12-21 RX ADMIN — INFLUENZA VIRUS VACCINE 0.5 ML: 15; 15; 15; 15 SUSPENSION INTRAMUSCULAR at 15:05

## 2020-12-29 ENCOUNTER — PREP FOR SURGERY (OUTPATIENT)
Dept: OTHER | Facility: HOSPITAL | Age: 30
End: 2020-12-29

## 2020-12-29 DIAGNOSIS — O34.211 MATERNAL CARE DUE TO LOW TRANSVERSE UTERINE SCAR FROM PREVIOUS CESAREAN DELIVERY: Primary | ICD-10-CM

## 2020-12-29 RX ORDER — SODIUM CHLORIDE, SODIUM LACTATE, POTASSIUM CHLORIDE, CALCIUM CHLORIDE 600; 310; 30; 20 MG/100ML; MG/100ML; MG/100ML; MG/100ML
125 INJECTION, SOLUTION INTRAVENOUS CONTINUOUS
Status: CANCELLED | OUTPATIENT
Start: 2020-12-29

## 2020-12-29 RX ORDER — IBUPROFEN 800 MG/1
800 TABLET ORAL EVERY 8 HOURS PRN
Status: CANCELLED | OUTPATIENT
Start: 2020-12-29

## 2020-12-29 RX ORDER — SODIUM CHLORIDE 0.9 % (FLUSH) 0.9 %
3 SYRINGE (ML) INJECTION EVERY 12 HOURS SCHEDULED
Status: CANCELLED | OUTPATIENT
Start: 2020-12-29

## 2020-12-29 RX ORDER — ONDANSETRON 2 MG/ML
4 INJECTION INTRAMUSCULAR; INTRAVENOUS EVERY 6 HOURS PRN
Status: CANCELLED | OUTPATIENT
Start: 2020-12-29

## 2020-12-29 RX ORDER — PROMETHAZINE HYDROCHLORIDE 25 MG/1
12.5 TABLET ORAL EVERY 6 HOURS PRN
Status: CANCELLED | OUTPATIENT
Start: 2020-12-29

## 2020-12-29 RX ORDER — OXYTOCIN/0.9 % SODIUM CHLORIDE 30/500 ML
125 PLASTIC BAG, INJECTION (ML) INTRAVENOUS CONTINUOUS PRN
Status: CANCELLED | OUTPATIENT
Start: 2020-12-29

## 2020-12-29 RX ORDER — METHYLERGONOVINE MALEATE 0.2 MG/ML
200 INJECTION INTRAVENOUS AS NEEDED
Status: CANCELLED | OUTPATIENT
Start: 2020-12-29

## 2020-12-29 RX ORDER — CARBOPROST TROMETHAMINE 250 UG/ML
250 INJECTION, SOLUTION INTRAMUSCULAR AS NEEDED
Status: CANCELLED | OUTPATIENT
Start: 2020-12-29

## 2020-12-29 RX ORDER — LIDOCAINE HYDROCHLORIDE 10 MG/ML
5 INJECTION, SOLUTION EPIDURAL; INFILTRATION; INTRACAUDAL; PERINEURAL AS NEEDED
Status: CANCELLED | OUTPATIENT
Start: 2020-12-29

## 2020-12-29 RX ORDER — MISOPROSTOL 200 UG/1
800 TABLET ORAL AS NEEDED
Status: CANCELLED | OUTPATIENT
Start: 2020-12-29

## 2020-12-29 RX ORDER — SODIUM CHLORIDE 0.9 % (FLUSH) 0.9 %
3-10 SYRINGE (ML) INJECTION AS NEEDED
Status: CANCELLED | OUTPATIENT
Start: 2020-12-29

## 2020-12-29 RX ORDER — MORPHINE SULFATE 2 MG/ML
2 INJECTION, SOLUTION INTRAMUSCULAR; INTRAVENOUS
Status: CANCELLED | OUTPATIENT
Start: 2020-12-29 | End: 2021-01-08

## 2020-12-29 RX ORDER — ONDANSETRON 4 MG/1
4 TABLET, FILM COATED ORAL EVERY 6 HOURS PRN
Status: CANCELLED | OUTPATIENT
Start: 2020-12-29

## 2020-12-29 RX ORDER — SODIUM CHLORIDE 0.9 % (FLUSH) 0.9 %
10 SYRINGE (ML) INJECTION AS NEEDED
Status: CANCELLED | OUTPATIENT
Start: 2020-12-29

## 2020-12-29 RX ORDER — CEFAZOLIN SODIUM IN 0.9 % NACL 3 G/100 ML
3 INTRAVENOUS SOLUTION, PIGGYBACK (ML) INTRAVENOUS ONCE
Status: CANCELLED | OUTPATIENT
Start: 2020-12-29 | End: 2020-12-29

## 2020-12-29 RX ORDER — OXYTOCIN/0.9 % SODIUM CHLORIDE 30/500 ML
250 PLASTIC BAG, INJECTION (ML) INTRAVENOUS CONTINUOUS
Status: CANCELLED | OUTPATIENT
Start: 2020-12-29 | End: 2020-12-29

## 2020-12-29 RX ORDER — OXYTOCIN/0.9 % SODIUM CHLORIDE 30/500 ML
999 PLASTIC BAG, INJECTION (ML) INTRAVENOUS ONCE
Status: CANCELLED | OUTPATIENT
Start: 2020-12-29

## 2020-12-29 RX ORDER — PROMETHAZINE HYDROCHLORIDE 12.5 MG/1
12.5 SUPPOSITORY RECTAL EVERY 6 HOURS PRN
Status: CANCELLED | OUTPATIENT
Start: 2020-12-29

## 2020-12-30 ENCOUNTER — HOSPITAL ENCOUNTER (INPATIENT)
Facility: HOSPITAL | Age: 30
LOS: 2 days | Discharge: HOME | End: 2021-01-01
Attending: OBSTETRICS & GYNECOLOGY | Admitting: OBSTETRICS & GYNECOLOGY

## 2020-12-30 ENCOUNTER — ANESTHESIA EVENT (OUTPATIENT)
Dept: LABOR AND DELIVERY | Facility: HOSPITAL | Age: 30
End: 2020-12-30

## 2020-12-30 ENCOUNTER — ANESTHESIA (OUTPATIENT)
Dept: LABOR AND DELIVERY | Facility: HOSPITAL | Age: 30
End: 2020-12-30

## 2020-12-30 DIAGNOSIS — O34.211 MATERNAL CARE DUE TO LOW TRANSVERSE UTERINE SCAR FROM PREVIOUS CESAREAN DELIVERY: ICD-10-CM

## 2020-12-30 DIAGNOSIS — Z3A.39 39 WEEKS GESTATION OF PREGNANCY: ICD-10-CM

## 2020-12-30 PROBLEM — Z34.90 PREGNANCY: Status: RESOLVED | Noted: 2020-12-21 | Resolved: 2020-12-30

## 2020-12-30 LAB
ABO GROUP BLD: NORMAL
BLD GP AB SCN SERPL QL: NEGATIVE
DEPRECATED RDW RBC AUTO: 48 FL (ref 37–54)
ERYTHROCYTE [DISTWIDTH] IN BLOOD BY AUTOMATED COUNT: 16.2 % (ref 12.3–15.4)
HCT VFR BLD AUTO: 34.5 % (ref 34–46.6)
HGB BLD-MCNC: 11.7 G/DL (ref 12–15.9)
LYMPHOCYTES # BLD MANUAL: 1.3 10*3/MM3 (ref 0.7–3.1)
LYMPHOCYTES NFR BLD MANUAL: 14.1 % (ref 19.6–45.3)
LYMPHOCYTES NFR BLD MANUAL: 6.1 % (ref 5–12)
MCH RBC QN AUTO: 27.9 PG (ref 26.6–33)
MCHC RBC AUTO-ENTMCNC: 33.9 G/DL (ref 31.5–35.7)
MCV RBC AUTO: 82.1 FL (ref 79–97)
MONOCYTES # BLD AUTO: 0.56 10*3/MM3 (ref 0.1–0.9)
NEUTROPHILS # BLD AUTO: 7.38 10*3/MM3 (ref 1.7–7)
NEUTROPHILS NFR BLD MANUAL: 79.8 % (ref 42.7–76)
PLAT MORPH BLD: NORMAL
PLATELET # BLD AUTO: 210 10*3/MM3 (ref 140–450)
PMV BLD AUTO: 11.7 FL (ref 6–12)
POLYCHROMASIA BLD QL SMEAR: ABNORMAL
RBC # BLD AUTO: 4.2 10*6/MM3 (ref 3.77–5.28)
RH BLD: POSITIVE
T&S EXPIRATION DATE: NORMAL
WBC # BLD AUTO: 9.25 10*3/MM3 (ref 3.4–10.8)
WBC MORPH BLD: NORMAL

## 2020-12-30 PROCEDURE — 25010000002 KETOROLAC TROMETHAMINE PER 15 MG: Performed by: OBSTETRICS & GYNECOLOGY

## 2020-12-30 PROCEDURE — 94799 UNLISTED PULMONARY SVC/PX: CPT

## 2020-12-30 PROCEDURE — 25010000002 HYDROMORPHONE 1 MG/ML SOLUTION: Performed by: NURSE ANESTHETIST, CERTIFIED REGISTERED

## 2020-12-30 PROCEDURE — 88307 TISSUE EXAM BY PATHOLOGIST: CPT | Performed by: OBSTETRICS & GYNECOLOGY

## 2020-12-30 PROCEDURE — 86850 RBC ANTIBODY SCREEN: CPT | Performed by: OBSTETRICS & GYNECOLOGY

## 2020-12-30 PROCEDURE — 25010000002 PHENYLEPHRINE HCL 0.8 MG/10ML SOLUTION PREFILLED SYRINGE: Performed by: NURSE ANESTHETIST, CERTIFIED REGISTERED

## 2020-12-30 PROCEDURE — 25010000002 CEFAZOLIN PER 500 MG: Performed by: OBSTETRICS & GYNECOLOGY

## 2020-12-30 PROCEDURE — 86901 BLOOD TYPING SEROLOGIC RH(D): CPT | Performed by: OBSTETRICS & GYNECOLOGY

## 2020-12-30 PROCEDURE — 25010000002 ONDANSETRON PER 1 MG: Performed by: NURSE ANESTHETIST, CERTIFIED REGISTERED

## 2020-12-30 PROCEDURE — 85007 BL SMEAR W/DIFF WBC COUNT: CPT | Performed by: OBSTETRICS & GYNECOLOGY

## 2020-12-30 PROCEDURE — 85027 COMPLETE CBC AUTOMATED: CPT | Performed by: OBSTETRICS & GYNECOLOGY

## 2020-12-30 PROCEDURE — 25010000002 BUTORPHANOL PER 1 MG: Performed by: NURSE ANESTHETIST, CERTIFIED REGISTERED

## 2020-12-30 PROCEDURE — 51702 INSERT TEMP BLADDER CATH: CPT

## 2020-12-30 PROCEDURE — 86900 BLOOD TYPING SEROLOGIC ABO: CPT | Performed by: OBSTETRICS & GYNECOLOGY

## 2020-12-30 PROCEDURE — 25010000002 CEFEPIME PER 500 MG: Performed by: OBSTETRICS & GYNECOLOGY

## 2020-12-30 PROCEDURE — C1765 ADHESION BARRIER: HCPCS | Performed by: OBSTETRICS & GYNECOLOGY

## 2020-12-30 DEVICE — ABSORBABLE ADHESION BARRIER
Type: IMPLANTABLE DEVICE | Site: ABDOMEN | Status: FUNCTIONAL
Brand: GYNECARE INTERCEED

## 2020-12-30 DEVICE — DEV CONTRL TISS STRATAFIX SPIRAL PGPCL 2/0FS 30X30CM: Type: IMPLANTABLE DEVICE | Site: ABDOMEN | Status: FUNCTIONAL

## 2020-12-30 RX ORDER — ONDANSETRON 2 MG/ML
4 INJECTION INTRAMUSCULAR; INTRAVENOUS EVERY 6 HOURS PRN
Status: DISCONTINUED | OUTPATIENT
Start: 2020-12-30 | End: 2020-12-30 | Stop reason: SDUPTHER

## 2020-12-30 RX ORDER — OXYTOCIN 10 [USP'U]/ML
INJECTION, SOLUTION INTRAMUSCULAR; INTRAVENOUS AS NEEDED
Status: DISCONTINUED | OUTPATIENT
Start: 2020-12-30 | End: 2020-12-30 | Stop reason: SURG

## 2020-12-30 RX ORDER — PHENYLEPHRINE HCL IN 0.9% NACL 0.8MG/10ML
SYRINGE (ML) INTRAVENOUS AS NEEDED
Status: DISCONTINUED | OUTPATIENT
Start: 2020-12-30 | End: 2020-12-30 | Stop reason: SURG

## 2020-12-30 RX ORDER — LIDOCAINE HYDROCHLORIDE 10 MG/ML
5 INJECTION, SOLUTION EPIDURAL; INFILTRATION; INTRACAUDAL; PERINEURAL AS NEEDED
Status: DISCONTINUED | OUTPATIENT
Start: 2020-12-30 | End: 2020-12-30 | Stop reason: HOSPADM

## 2020-12-30 RX ORDER — CALCIUM CARBONATE 200(500)MG
1 TABLET,CHEWABLE ORAL 2 TIMES DAILY PRN
Status: DISCONTINUED | OUTPATIENT
Start: 2020-12-30 | End: 2021-01-01 | Stop reason: HOSPADM

## 2020-12-30 RX ORDER — PROMETHAZINE HYDROCHLORIDE 12.5 MG/1
12.5 SUPPOSITORY RECTAL EVERY 6 HOURS PRN
Status: DISCONTINUED | OUTPATIENT
Start: 2020-12-30 | End: 2021-01-01 | Stop reason: HOSPADM

## 2020-12-30 RX ORDER — ONDANSETRON 2 MG/ML
4 INJECTION INTRAMUSCULAR; INTRAVENOUS EVERY 6 HOURS PRN
Status: DISCONTINUED | OUTPATIENT
Start: 2020-12-30 | End: 2021-01-01 | Stop reason: HOSPADM

## 2020-12-30 RX ORDER — SODIUM CHLORIDE, SODIUM LACTATE, POTASSIUM CHLORIDE, CALCIUM CHLORIDE 600; 310; 30; 20 MG/100ML; MG/100ML; MG/100ML; MG/100ML
125 INJECTION, SOLUTION INTRAVENOUS CONTINUOUS
Status: DISCONTINUED | OUTPATIENT
Start: 2020-12-30 | End: 2020-12-30

## 2020-12-30 RX ORDER — DOCUSATE SODIUM 100 MG/1
100 CAPSULE, LIQUID FILLED ORAL 2 TIMES DAILY PRN
Status: DISCONTINUED | OUTPATIENT
Start: 2020-12-30 | End: 2021-01-01 | Stop reason: HOSPADM

## 2020-12-30 RX ORDER — SODIUM CHLORIDE 0.9 % (FLUSH) 0.9 %
3 SYRINGE (ML) INJECTION EVERY 12 HOURS SCHEDULED
Status: DISCONTINUED | OUTPATIENT
Start: 2020-12-30 | End: 2021-01-01 | Stop reason: HOSPADM

## 2020-12-30 RX ORDER — TRISODIUM CITRATE DIHYDRATE AND CITRIC ACID MONOHYDRATE 500; 334 MG/5ML; MG/5ML
15 SOLUTION ORAL ONCE
Status: COMPLETED | OUTPATIENT
Start: 2020-12-30 | End: 2020-12-30

## 2020-12-30 RX ORDER — OXYTOCIN/0.9 % SODIUM CHLORIDE 30/500 ML
125 PLASTIC BAG, INJECTION (ML) INTRAVENOUS CONTINUOUS PRN
Status: DISCONTINUED | OUTPATIENT
Start: 2020-12-30 | End: 2020-12-30 | Stop reason: HOSPADM

## 2020-12-30 RX ORDER — KETOROLAC TROMETHAMINE 15 MG/ML
15 INJECTION, SOLUTION INTRAMUSCULAR; INTRAVENOUS EVERY 6 HOURS
Status: COMPLETED | OUTPATIENT
Start: 2020-12-30 | End: 2020-12-31

## 2020-12-30 RX ORDER — NALOXONE HCL 0.4 MG/ML
0.4 VIAL (ML) INJECTION
Status: ACTIVE | OUTPATIENT
Start: 2020-12-30 | End: 2020-12-31

## 2020-12-30 RX ORDER — FAMOTIDINE 10 MG/ML
20 INJECTION, SOLUTION INTRAVENOUS ONCE AS NEEDED
Status: COMPLETED | OUTPATIENT
Start: 2020-12-30 | End: 2020-12-30

## 2020-12-30 RX ORDER — OXYCODONE AND ACETAMINOPHEN 7.5; 325 MG/1; MG/1
1 TABLET ORAL EVERY 4 HOURS PRN
Status: DISPENSED | OUTPATIENT
Start: 2020-12-30 | End: 2020-12-31

## 2020-12-30 RX ORDER — ONDANSETRON 4 MG/1
4 TABLET, FILM COATED ORAL EVERY 6 HOURS PRN
Status: DISCONTINUED | OUTPATIENT
Start: 2020-12-30 | End: 2021-01-01 | Stop reason: HOSPADM

## 2020-12-30 RX ORDER — OXYTOCIN/0.9 % SODIUM CHLORIDE 30/500 ML
125 PLASTIC BAG, INJECTION (ML) INTRAVENOUS CONTINUOUS PRN
Status: DISCONTINUED | OUTPATIENT
Start: 2020-12-30 | End: 2021-01-01 | Stop reason: HOSPADM

## 2020-12-30 RX ORDER — CEFAZOLIN SODIUM IN 0.9 % NACL 3 G/100 ML
3 INTRAVENOUS SOLUTION, PIGGYBACK (ML) INTRAVENOUS ONCE
Status: COMPLETED | OUTPATIENT
Start: 2020-12-30 | End: 2020-12-30

## 2020-12-30 RX ORDER — PRENATAL VIT/IRON FUM/FOLIC AC 27MG-0.8MG
1 TABLET ORAL DAILY
Status: DISCONTINUED | OUTPATIENT
Start: 2020-12-30 | End: 2021-01-01 | Stop reason: HOSPADM

## 2020-12-30 RX ORDER — OXYTOCIN/0.9 % SODIUM CHLORIDE 30/500 ML
999 PLASTIC BAG, INJECTION (ML) INTRAVENOUS ONCE
Status: DISCONTINUED | OUTPATIENT
Start: 2020-12-30 | End: 2020-12-30 | Stop reason: HOSPADM

## 2020-12-30 RX ORDER — CARBOPROST TROMETHAMINE 250 UG/ML
250 INJECTION, SOLUTION INTRAMUSCULAR AS NEEDED
Status: DISCONTINUED | OUTPATIENT
Start: 2020-12-30 | End: 2020-12-30 | Stop reason: HOSPADM

## 2020-12-30 RX ORDER — IBUPROFEN 800 MG/1
800 TABLET ORAL EVERY 8 HOURS PRN
Status: DISCONTINUED | OUTPATIENT
Start: 2020-12-31 | End: 2021-01-01 | Stop reason: HOSPADM

## 2020-12-30 RX ORDER — OXYCODONE AND ACETAMINOPHEN 10; 325 MG/1; MG/1
1 TABLET ORAL EVERY 6 HOURS PRN
Status: DISCONTINUED | OUTPATIENT
Start: 2020-12-31 | End: 2021-01-01 | Stop reason: HOSPADM

## 2020-12-30 RX ORDER — SODIUM CHLORIDE 0.9 % (FLUSH) 0.9 %
3-10 SYRINGE (ML) INJECTION AS NEEDED
Status: DISCONTINUED | OUTPATIENT
Start: 2020-12-30 | End: 2020-12-30 | Stop reason: HOSPADM

## 2020-12-30 RX ORDER — METHYLERGONOVINE MALEATE 0.2 MG/ML
200 INJECTION INTRAVENOUS AS NEEDED
Status: DISCONTINUED | OUTPATIENT
Start: 2020-12-30 | End: 2020-12-30 | Stop reason: HOSPADM

## 2020-12-30 RX ORDER — OXYTOCIN/0.9 % SODIUM CHLORIDE 30/500 ML
250 PLASTIC BAG, INJECTION (ML) INTRAVENOUS CONTINUOUS
Status: DISCONTINUED | OUTPATIENT
Start: 2020-12-30 | End: 2020-12-30 | Stop reason: HOSPADM

## 2020-12-30 RX ORDER — OXYCODONE AND ACETAMINOPHEN 7.5; 325 MG/1; MG/1
2 TABLET ORAL EVERY 4 HOURS PRN
Status: DISPENSED | OUTPATIENT
Start: 2020-12-30 | End: 2020-12-31

## 2020-12-30 RX ORDER — OXYTOCIN/0.9 % SODIUM CHLORIDE 30/500 ML
250 PLASTIC BAG, INJECTION (ML) INTRAVENOUS CONTINUOUS
Status: ACTIVE | OUTPATIENT
Start: 2020-12-30 | End: 2020-12-30

## 2020-12-30 RX ORDER — AMOXICILLIN 250 MG
1 CAPSULE ORAL 2 TIMES DAILY
Status: DISCONTINUED | OUTPATIENT
Start: 2020-12-30 | End: 2021-01-01 | Stop reason: HOSPADM

## 2020-12-30 RX ORDER — PROMETHAZINE HYDROCHLORIDE 25 MG/1
25 TABLET ORAL EVERY 6 HOURS PRN
Status: DISCONTINUED | OUTPATIENT
Start: 2020-12-30 | End: 2021-01-01 | Stop reason: HOSPADM

## 2020-12-30 RX ORDER — OXYCODONE HYDROCHLORIDE AND ACETAMINOPHEN 5; 325 MG/1; MG/1
1 TABLET ORAL EVERY 4 HOURS PRN
Status: DISCONTINUED | OUTPATIENT
Start: 2020-12-31 | End: 2021-01-01 | Stop reason: HOSPADM

## 2020-12-30 RX ORDER — IBUPROFEN 800 MG/1
800 TABLET ORAL EVERY 8 HOURS PRN
Status: DISCONTINUED | OUTPATIENT
Start: 2020-12-30 | End: 2020-12-30 | Stop reason: HOSPADM

## 2020-12-30 RX ORDER — OXYTOCIN/0.9 % SODIUM CHLORIDE 30/500 ML
999 PLASTIC BAG, INJECTION (ML) INTRAVENOUS ONCE
Status: DISCONTINUED | OUTPATIENT
Start: 2020-12-30 | End: 2021-01-01 | Stop reason: HOSPADM

## 2020-12-30 RX ORDER — SODIUM CHLORIDE, SODIUM LACTATE, POTASSIUM CHLORIDE, CALCIUM CHLORIDE 600; 310; 30; 20 MG/100ML; MG/100ML; MG/100ML; MG/100ML
125 INJECTION, SOLUTION INTRAVENOUS CONTINUOUS
Status: DISCONTINUED | OUTPATIENT
Start: 2020-12-30 | End: 2021-01-01 | Stop reason: HOSPADM

## 2020-12-30 RX ORDER — BISACODYL 5 MG/1
10 TABLET, DELAYED RELEASE ORAL DAILY PRN
Status: DISCONTINUED | OUTPATIENT
Start: 2020-12-30 | End: 2021-01-01 | Stop reason: HOSPADM

## 2020-12-30 RX ORDER — SODIUM CHLORIDE 0.9 % (FLUSH) 0.9 %
3-10 SYRINGE (ML) INJECTION AS NEEDED
Status: DISCONTINUED | OUTPATIENT
Start: 2020-12-30 | End: 2021-01-01 | Stop reason: HOSPADM

## 2020-12-30 RX ORDER — BUTORPHANOL TARTRATE 1 MG/ML
0.5 INJECTION, SOLUTION INTRAMUSCULAR; INTRAVENOUS EVERY 6 HOURS PRN
Status: COMPLETED | OUTPATIENT
Start: 2020-12-30 | End: 2020-12-31

## 2020-12-30 RX ORDER — ONDANSETRON 2 MG/ML
INJECTION INTRAMUSCULAR; INTRAVENOUS AS NEEDED
Status: DISCONTINUED | OUTPATIENT
Start: 2020-12-30 | End: 2020-12-30 | Stop reason: SURG

## 2020-12-30 RX ORDER — SODIUM CHLORIDE 0.9 % (FLUSH) 0.9 %
3 SYRINGE (ML) INJECTION EVERY 12 HOURS SCHEDULED
Status: DISCONTINUED | OUTPATIENT
Start: 2020-12-30 | End: 2020-12-30 | Stop reason: HOSPADM

## 2020-12-30 RX ORDER — BUPIVACAINE HYDROCHLORIDE 7.5 MG/ML
INJECTION, SOLUTION INTRASPINAL AS NEEDED
Status: DISCONTINUED | OUTPATIENT
Start: 2020-12-30 | End: 2020-12-30 | Stop reason: SURG

## 2020-12-30 RX ADMIN — Medication 160 MCG: at 08:06

## 2020-12-30 RX ADMIN — KETOROLAC TROMETHAMINE 15 MG: 15 INJECTION, SOLUTION INTRAMUSCULAR; INTRAVENOUS at 10:51

## 2020-12-30 RX ADMIN — OXYTOCIN 20 UNITS: 10 INJECTION, SOLUTION INTRAMUSCULAR; INTRAVENOUS at 08:23

## 2020-12-30 RX ADMIN — BUTORPHANOL TARTRATE 0.5 MG: 1 INJECTION, SOLUTION INTRAMUSCULAR; INTRAVENOUS at 22:01

## 2020-12-30 RX ADMIN — FAMOTIDINE 20 MG: 10 INJECTION INTRAVENOUS at 07:15

## 2020-12-30 RX ADMIN — SODIUM CHLORIDE, POTASSIUM CHLORIDE, SODIUM LACTATE AND CALCIUM CHLORIDE: 600; 310; 30; 20 INJECTION, SOLUTION INTRAVENOUS at 07:56

## 2020-12-30 RX ADMIN — DOCUSATE SODIUM 50 MG AND SENNOSIDES 8.6 MG 1 TABLET: 8.6; 5 TABLET, FILM COATED ORAL at 20:56

## 2020-12-30 RX ADMIN — OXYCODONE HYDROCHLORIDE AND ACETAMINOPHEN 1 TABLET: 7.5; 325 TABLET ORAL at 15:22

## 2020-12-30 RX ADMIN — SODIUM CHLORIDE, PRESERVATIVE FREE 3 ML: 5 INJECTION INTRAVENOUS at 22:01

## 2020-12-30 RX ADMIN — OXYCODONE HYDROCHLORIDE AND ACETAMINOPHEN 1 TABLET: 7.5; 325 TABLET ORAL at 20:58

## 2020-12-30 RX ADMIN — HYDROMORPHONE HYDROCHLORIDE 100 MCG: 1 INJECTION, SOLUTION INTRAMUSCULAR; INTRAVENOUS; SUBCUTANEOUS at 08:01

## 2020-12-30 RX ADMIN — HYDROMORPHONE HYDROCHLORIDE 450 MCG: 1 INJECTION, SOLUTION INTRAMUSCULAR; INTRAVENOUS; SUBCUTANEOUS at 08:28

## 2020-12-30 RX ADMIN — ONDANSETRON HYDROCHLORIDE 8 MG: 2 SOLUTION INTRAMUSCULAR; INTRAVENOUS at 08:25

## 2020-12-30 RX ADMIN — CEFAZOLIN SODIUM 3 G: 10 INJECTION, POWDER, FOR SOLUTION INTRAVENOUS at 07:59

## 2020-12-30 RX ADMIN — CEFEPIME HYDROCHLORIDE 2 G: 2 INJECTION, POWDER, FOR SOLUTION INTRAVENOUS at 09:00

## 2020-12-30 RX ADMIN — BUTORPHANOL TARTRATE 0.5 MG: 1 INJECTION, SOLUTION INTRAMUSCULAR; INTRAVENOUS at 15:22

## 2020-12-30 RX ADMIN — CEFEPIME HYDROCHLORIDE 2 G: 2 INJECTION, POWDER, FOR SOLUTION INTRAVENOUS at 16:55

## 2020-12-30 RX ADMIN — SODIUM CITRATE AND CITRIC ACID MONOHYDRATE 15 ML: 500; 334 SOLUTION ORAL at 07:15

## 2020-12-30 RX ADMIN — OXYCODONE HYDROCHLORIDE AND ACETAMINOPHEN 2 TABLET: 7.5; 325 TABLET ORAL at 10:51

## 2020-12-30 RX ADMIN — METRONIDAZOLE 500 MG: 500 INJECTION, SOLUTION INTRAVENOUS at 09:38

## 2020-12-30 RX ADMIN — SODIUM CHLORIDE, POTASSIUM CHLORIDE, SODIUM LACTATE AND CALCIUM CHLORIDE 1000 ML: 600; 310; 30; 20 INJECTION, SOLUTION INTRAVENOUS at 05:43

## 2020-12-30 RX ADMIN — BUTORPHANOL TARTRATE 0.5 MG: 1 INJECTION, SOLUTION INTRAMUSCULAR; INTRAVENOUS at 09:32

## 2020-12-30 RX ADMIN — METRONIDAZOLE 500 MG: 500 INJECTION, SOLUTION INTRAVENOUS at 20:57

## 2020-12-30 RX ADMIN — SODIUM CHLORIDE, POTASSIUM CHLORIDE, SODIUM LACTATE AND CALCIUM CHLORIDE 125 ML/HR: 600; 310; 30; 20 INJECTION, SOLUTION INTRAVENOUS at 22:06

## 2020-12-30 RX ADMIN — KETOROLAC TROMETHAMINE 15 MG: 15 INJECTION, SOLUTION INTRAMUSCULAR; INTRAVENOUS at 23:55

## 2020-12-30 RX ADMIN — BUPIVACAINE HYDROCHLORIDE IN DEXTROSE 1.5 ML: 7.5 INJECTION, SOLUTION SUBARACHNOID at 08:01

## 2020-12-30 RX ADMIN — KETOROLAC TROMETHAMINE 15 MG: 15 INJECTION, SOLUTION INTRAMUSCULAR; INTRAVENOUS at 16:54

## 2020-12-30 RX ADMIN — OXYTOCIN 10 UNITS: 10 INJECTION, SOLUTION INTRAMUSCULAR; INTRAVENOUS at 08:24

## 2020-12-30 RX ADMIN — METRONIDAZOLE 500 MG: 500 INJECTION, SOLUTION INTRAVENOUS at 15:23

## 2020-12-30 RX ADMIN — HYDROMORPHONE HYDROCHLORIDE 450 MCG: 1 INJECTION, SOLUTION INTRAMUSCULAR; INTRAVENOUS; SUBCUTANEOUS at 08:31

## 2020-12-30 NOTE — ANESTHESIA PREPROCEDURE EVALUATION
Anesthesia Evaluation     Patient summary reviewed   no history of anesthetic complications:  NPO Solid Status: > 8 hours  NPO Liquid Status: > 8 hours           Airway   Mallampati: I  TM distance: >3 FB  Neck ROM: full  No difficulty expected  Dental - normal exam     Pulmonary - negative pulmonary ROS and normal exam   Cardiovascular - normal exam  Exercise tolerance: excellent (>7 METS)        Neuro/Psych  (+) headaches,     GI/Hepatic/Renal/Endo    (+)   renal disease stones,     Musculoskeletal (-) negative ROS    Abdominal  - normal exam   Substance History - negative use     OB/GYN          Other - negative ROS           Phys Exam Other: Lab Results       Component                Value               Date                       WBC                      9.25                12/30/2020                 HGB                      11.7 (L)            12/30/2020                 HCT                      34.5                12/30/2020                 MCV                      82.1                12/30/2020                 PLT                      210                 12/30/2020                          Anesthesia Plan    ASA 2     spinal       Anesthetic plan, all risks, benefits, and alternatives have been provided, discussed and informed consent has been obtained with: patient and spouse/significant other.    Plan discussed with CRNA.

## 2020-12-30 NOTE — ANESTHESIA PROCEDURE NOTES
Spinal Block      Patient location during procedure: OR  Indication:procedure for pain  Performed By  CRNA: Indra Acevedo CRNA  Preanesthetic Checklist  Completed: patient identified, site marked, surgical consent, pre-op evaluation, timeout performed, IV checked, risks and benefits discussed and monitors and equipment checked  Spinal Block Prep:  Patient Position:sitting  Sterile Tech:cap, gloves, mask and sterile barriers  Prep:Chloraprep  Patient Monitoring:blood pressure monitoring, continuous pulse oximetry and EKG  Spinal Block Procedure  Approach:midline  Guidance:palpation technique  Location:L3-L4  Needle Type:Quincke  Needle Gauge:25 G    Fluid Appearance:clear     Post Assessment  Patient Tolerance:patient tolerated the procedure well with no apparent complications  Complications no

## 2020-12-31 LAB
BASOPHILS # BLD AUTO: 0.01 10*3/MM3 (ref 0–0.2)
BASOPHILS NFR BLD AUTO: 0.1 % (ref 0–1.5)
DEPRECATED RDW RBC AUTO: 49.8 FL (ref 37–54)
EOSINOPHIL # BLD AUTO: 0.06 10*3/MM3 (ref 0–0.4)
EOSINOPHIL NFR BLD AUTO: 0.6 % (ref 0.3–6.2)
ERYTHROCYTE [DISTWIDTH] IN BLOOD BY AUTOMATED COUNT: 16.3 % (ref 12.3–15.4)
HCT VFR BLD AUTO: 31.5 % (ref 34–46.6)
HGB BLD-MCNC: 10.3 G/DL (ref 12–15.9)
IMM GRANULOCYTES # BLD AUTO: 0.05 10*3/MM3 (ref 0–0.05)
IMM GRANULOCYTES NFR BLD AUTO: 0.5 % (ref 0–0.5)
LYMPHOCYTES # BLD AUTO: 1.08 10*3/MM3 (ref 0.7–3.1)
LYMPHOCYTES NFR BLD AUTO: 10.9 % (ref 19.6–45.3)
MCH RBC QN AUTO: 27.4 PG (ref 26.6–33)
MCHC RBC AUTO-ENTMCNC: 32.7 G/DL (ref 31.5–35.7)
MCV RBC AUTO: 83.8 FL (ref 79–97)
MONOCYTES # BLD AUTO: 0.53 10*3/MM3 (ref 0.1–0.9)
MONOCYTES NFR BLD AUTO: 5.4 % (ref 5–12)
NEUTROPHILS NFR BLD AUTO: 8.17 10*3/MM3 (ref 1.7–7)
NEUTROPHILS NFR BLD AUTO: 82.5 % (ref 42.7–76)
NRBC BLD AUTO-RTO: 0 /100 WBC (ref 0–0.2)
PLATELET # BLD AUTO: 190 10*3/MM3 (ref 140–450)
PMV BLD AUTO: 11.6 FL (ref 6–12)
RBC # BLD AUTO: 3.76 10*6/MM3 (ref 3.77–5.28)
WBC # BLD AUTO: 9.9 10*3/MM3 (ref 3.4–10.8)

## 2020-12-31 PROCEDURE — 25010000002 KETOROLAC TROMETHAMINE PER 15 MG: Performed by: OBSTETRICS & GYNECOLOGY

## 2020-12-31 PROCEDURE — 85025 COMPLETE CBC W/AUTO DIFF WBC: CPT | Performed by: OBSTETRICS & GYNECOLOGY

## 2020-12-31 PROCEDURE — 25010000002 CEFEPIME PER 500 MG: Performed by: OBSTETRICS & GYNECOLOGY

## 2020-12-31 PROCEDURE — 25010000002 BUTORPHANOL PER 1 MG: Performed by: NURSE ANESTHETIST, CERTIFIED REGISTERED

## 2020-12-31 RX ADMIN — IBUPROFEN 800 MG: 800 TABLET, FILM COATED ORAL at 21:34

## 2020-12-31 RX ADMIN — OXYCODONE HYDROCHLORIDE AND ACETAMINOPHEN 1 TABLET: 7.5; 325 TABLET ORAL at 03:31

## 2020-12-31 RX ADMIN — DOCUSATE SODIUM 50 MG AND SENNOSIDES 8.6 MG 1 TABLET: 8.6; 5 TABLET, FILM COATED ORAL at 21:02

## 2020-12-31 RX ADMIN — SODIUM CHLORIDE, PRESERVATIVE FREE 3 ML: 5 INJECTION INTRAVENOUS at 21:44

## 2020-12-31 RX ADMIN — DOCUSATE SODIUM 50 MG AND SENNOSIDES 8.6 MG 1 TABLET: 8.6; 5 TABLET, FILM COATED ORAL at 08:43

## 2020-12-31 RX ADMIN — BUTORPHANOL TARTRATE 0.5 MG: 1 INJECTION, SOLUTION INTRAMUSCULAR; INTRAVENOUS at 04:25

## 2020-12-31 RX ADMIN — METRONIDAZOLE 500 MG: 500 INJECTION, SOLUTION INTRAVENOUS at 03:23

## 2020-12-31 RX ADMIN — OXYCODONE HYDROCHLORIDE AND ACETAMINOPHEN 1 TABLET: 10; 325 TABLET ORAL at 19:34

## 2020-12-31 RX ADMIN — KETOROLAC TROMETHAMINE 15 MG: 15 INJECTION, SOLUTION INTRAMUSCULAR; INTRAVENOUS at 05:39

## 2020-12-31 RX ADMIN — OXYCODONE HYDROCHLORIDE AND ACETAMINOPHEN 1 TABLET: 10; 325 TABLET ORAL at 13:38

## 2020-12-31 RX ADMIN — OXYCODONE HYDROCHLORIDE AND ACETAMINOPHEN 2 TABLET: 7.5; 325 TABLET ORAL at 08:43

## 2020-12-31 RX ADMIN — IBUPROFEN 800 MG: 800 TABLET, FILM COATED ORAL at 13:38

## 2020-12-31 RX ADMIN — CEFEPIME HYDROCHLORIDE 2 G: 2 INJECTION, POWDER, FOR SOLUTION INTRAVENOUS at 05:39

## 2020-12-31 NOTE — ANESTHESIA POSTPROCEDURE EVALUATION
"Patient: Adina FISHER    Procedure Summary     Date: 20 Room / Location: [facility] LABOR DELIVERY 1 /  [facility] LABOR DELIVERY    Anesthesia Start: 756 Anesthesia Stop: 853    Procedure: REPEAT  SECTION WITHOUT TUBAL LIGATION (N/A Abdomen) Diagnosis: (PREVIOUS CD)    Surgeon: Cecily Riggs MD Provider: Indra Acevedo CRNA    Anesthesia Type: spinal ASA Status: 2          Anesthesia Type: spinal    Vitals  Vitals Value Taken Time   /69 20 1214   Temp 98.3 °F (36.8 °C) 20 1214   Pulse 91 20 1214   Resp 18 20 1214   SpO2 98 % 20 1214           Post Anesthesia Care and Evaluation    Patient location during evaluation: bedside  Patient participation: complete - patient participated  Level of consciousness: awake and alert  Pain management: adequate  Airway patency: patent  Anesthetic complications: No anesthetic complications    Cardiovascular status: acceptable  Respiratory status: acceptable  Hydration status: acceptable  Post Neuraxial Block status: Motor and sensory function returned to baseline and No signs or symptoms of PDPH  Comments: Blood pressure 121/69, pulse 91, temperature 98.3 °F (36.8 °C), temperature source Oral, resp. rate 18, height 170.2 cm (67\"), weight 105 kg (231 lb), SpO2 98 %, currently breastfeeding.    Pt discharged from PACU based on tomi score >8      "

## 2020-12-31 NOTE — ANESTHESIA POSTPROCEDURE EVALUATION
"Patient: Adina FISHER    Procedure Summary     Date: 20 Room / Location: [facility] LABOR DELIVERY 1 /  [facility] LABOR DELIVERY    Anesthesia Start: 756 Anesthesia Stop: 853    Procedure: REPEAT  SECTION WITHOUT TUBAL LIGATION (N/A Abdomen) Diagnosis: (PREVIOUS CD)    Surgeon: Cecily Riggs MD Provider: Indra Acevedo CRNA    Anesthesia Type: spinal ASA Status: 2          Anesthesia Type: spinal    Vitals  Vitals Value Taken Time   /69 20 1214   Temp 98.3 °F (36.8 °C) 20 1214   Pulse 91 20 1214   Resp 18 20 1214   SpO2 98 % 20 1214           Post Anesthesia Care and Evaluation    Patient location during evaluation: PACU  Patient participation: complete - patient participated  Level of consciousness: awake and alert  Pain management: adequate  Airway patency: patent  Anesthetic complications: No anesthetic complications    Cardiovascular status: acceptable  Respiratory status: acceptable  Hydration status: acceptable    Comments: Blood pressure 121/69, pulse 91, temperature 98.3 °F (36.8 °C), temperature source Oral, resp. rate 18, height 170.2 cm (67\"), weight 105 kg (231 lb), SpO2 98 %, currently breastfeeding.    Pt discharged from PACU based on tomi score >8      "

## 2021-01-01 VITALS
BODY MASS INDEX: 36.26 KG/M2 | TEMPERATURE: 97.3 F | RESPIRATION RATE: 16 BRPM | DIASTOLIC BLOOD PRESSURE: 63 MMHG | WEIGHT: 231 LBS | OXYGEN SATURATION: 98 % | SYSTOLIC BLOOD PRESSURE: 107 MMHG | HEIGHT: 67 IN | HEART RATE: 87 BPM

## 2021-01-01 PROBLEM — O34.211 MATERNAL CARE DUE TO LOW TRANSVERSE UTERINE SCAR FROM PREVIOUS CESAREAN DELIVERY: Status: RESOLVED | Noted: 2020-12-29 | Resolved: 2021-01-01

## 2021-01-01 PROCEDURE — 90471 IMMUNIZATION ADMIN: CPT | Performed by: OBSTETRICS & GYNECOLOGY

## 2021-01-01 PROCEDURE — 25010000002 TDAP 5-2.5-18.5 LF-MCG/0.5 SUSPENSION: Performed by: OBSTETRICS & GYNECOLOGY

## 2021-01-01 PROCEDURE — 90715 TDAP VACCINE 7 YRS/> IM: CPT | Performed by: OBSTETRICS & GYNECOLOGY

## 2021-01-01 RX ORDER — IBUPROFEN 800 MG/1
800 TABLET ORAL EVERY 8 HOURS PRN
Qty: 90 TABLET | Refills: 2 | Status: SHIPPED | OUTPATIENT
Start: 2021-01-01 | End: 2022-07-29

## 2021-01-01 RX ORDER — OXYCODONE HYDROCHLORIDE AND ACETAMINOPHEN 5; 325 MG/1; MG/1
1 TABLET ORAL EVERY 4 HOURS PRN
Qty: 42 TABLET | Refills: 0 | Status: SHIPPED | OUTPATIENT
Start: 2021-01-01 | End: 2021-01-07

## 2021-01-01 RX ADMIN — TETANUS TOXOID, REDUCED DIPHTHERIA TOXOID AND ACELLULAR PERTUSSIS VACCINE, ADSORBED 0.5 ML: 5; 2.5; 8; 8; 2.5 SUSPENSION INTRAMUSCULAR at 08:16

## 2021-01-01 RX ADMIN — DOCUSATE SODIUM 100 MG: 100 CAPSULE ORAL at 08:15

## 2021-01-01 RX ADMIN — IBUPROFEN 800 MG: 800 TABLET, FILM COATED ORAL at 05:15

## 2021-01-01 RX ADMIN — OXYCODONE HYDROCHLORIDE AND ACETAMINOPHEN 1 TABLET: 10; 325 TABLET ORAL at 01:58

## 2021-01-01 RX ADMIN — PRENATAL VIT W/ FE FUMARATE-FA TAB 27-0.8 MG 1 TABLET: 27-0.8 TAB at 08:15

## 2021-01-01 RX ADMIN — OXYCODONE HYDROCHLORIDE AND ACETAMINOPHEN 1 TABLET: 10; 325 TABLET ORAL at 08:15

## 2021-01-01 NOTE — DISCHARGE SUMMARY
Discharge Summary     Rica FISHER  : 1990  MRN: [id number]  [facility]: [id number]    Date of Admission: 2020   Date of Discharge:  2021   Delivering Physician:         Admission Diagnosis: 1.  delivery delivered [O82]  2. Maternal care due to low transverse uterine scar from previous  delivery [O34.211]  3. Maternal care due to low transverse uterine scar from previous  delivery [O34.211]   Discharge Diagnosis: 1. Pregnancy at 39w6d - delivered       Procedures: 2020  - , Low Transverse       Hospital Course  Patient is a 30 y.o.  who at 39w6d had a  section.  Her postoperative course was without complications.  On POD #2 she was ready for discharge.  She had normal lochia and pain well controlled with oral medications.    Infant  male  fetus weighing 3740 g (8 lb 3.9 oz)   Apgars -  8 @ 1 minute /  9 @ 5 minutes.    Discharge labs  Lab Results   Component Value Date    WBC 9.90 2020    HGB 10.3 (L) 2020    HCT 31.5 (L) 2020     2020       Discharge Medications     Discharge Medications      New Medications      Instructions Start Date   ibuprofen 800 MG tablet  Commonly known as: ADVIL,MOTRIN   800 mg, Oral, Every 8 Hours PRN      oxyCODONE-acetaminophen 5-325 MG per tablet  Commonly known as: PERCOCET   1 tablet, Oral, Every 4 Hours PRN         Continue These Medications      Instructions Start Date   ferrous sulfate 325 (65 FE) MG tablet   325 mg, Oral, 3 Times Daily With Meals      omeprazole 20 MG capsule  Commonly known as: priLOSEC   20 mg, Oral, Daily      prenatal (CLASSIC) vitamin  tablet  Generic drug: prenatal vitamin   1 tablet, Oral, Daily             Discharge Disposition Home or Self Care   Condition on Discharge: good   Follow-up: 2 weeks with MD Cecily Ramírez MD  2021

## 2021-01-01 NOTE — PLAN OF CARE
Goal Outcome Evaluation:  Plan of Care Reviewed With: patient, spouse  Progress: improving  Outcome Summary: VSS, fundus firm midline and 2 below umbilicus, scant lochia, low transverse with prineo, abdominal binder in place, voiding without difficulty, pain controlled with prn pain medication, breastfeeding well, using incentive spirameter, spouse at bedside and attentive to pt, bonding well with infant

## 2021-01-01 NOTE — PROGRESS NOTES
"[facility]   Section Postpartum Delivery Progress Note    Subjective   Postpartum Day 2:  Delivery    The patient feels well.  Her pain is well controlled with prescribed pain medications.   She is ambulating well.  Patient describes her bleeding as thin lochia.    Breastfeeding: without difficulty.    Objective     Vital Signs Range for the last 24 hours  Temperature: Temp:  [97 °F (36.1 °C)-98.3 °F (36.8 °C)] 97 °F (36.1 °C)   Temp Source: Temp src: Oral   BP: BP: (108-127)/(63-75) 108/63   Pulse: Heart Rate:  [83-91] 83   Respirations: Resp:  [18] 18   SPO2: SpO2:  [96 %-98 %] 98 %   O2 Amount(L/min):     O2 Devices Device (Oxygen Therapy): room air   Weight:       Admit Height:  Height: 170.2 cm (67\")      Physical Exam:  General:  no acute distresss.  Abdomen: Fundus: appropriate, firm, non tender  Extremities: normal, atraumatic, no cyanosis, and trace edema.   Incision: clean, dry & intact    Lab results reviewed:  Yes   Rubella:  No results found for: RUBELLAIGGIN Nurse Transcribed from prenatal record --  No components found for: EXTRUBELQUAL  Rh Status:    RH type   Date Value Ref Range Status   2020 Positive  Final     Immunizations:   Immunization History   Administered Date(s) Administered   • Flulaval/Fluarix/Fluzone Quad 2020   • Influenza Quad Vaccine (Inpatient) 10/04/2017   • MMR 10/03/2017   • Tdap 10/03/2017       Assessment/Plan        delivery delivered    Maternal care due to low transverse uterine scar from previous  delivery      Adina FREIRE HPILLIP is Day 2  post-partum  , Low Transverse   .      Plan:  Discharge home with standard precautions and return to clinic in 4-6 weeks.      Cecily Riggs MD  2021  06:57 CST  "

## 2021-01-01 NOTE — LACTATION NOTE
"Mother's Name: Adina Harris Phone #: [phone]  Infant Name: Leida   : 20  Gestation: 39w6d  Day of life: 2  Birth weight:  8-3.9 (3740g)  Discharge weight: 7-10.3 (3468g)  Weight Loss: -7.27%  24 hour Summary of Feeds: 7BF Voids: 4 Stools:  3  Assistive devices (shields, shells, etc): None  Significant Maternal history: , Migraine, ovarian cyst, Anemia, Chronic Kidney Disease, Cleft lip and palate repair, , Kidney stone,  1st child 4 months  Maternal Concerns: None  Maternal Goal: Breastfeed >6 months   Mother's Medications: FE, Prilosec, PNV  Breastpump for home: yes double electric and hand pump   Ped follow up appt: Dr Stearns--Patient to call Monday, 2021 to make appointment    Patient reports breastfeeding to be going well. Denied any pain with feeds, questions, or concerns. States, \"We are doing good.\" No order noted by physician to follow up with [facility] Lactation. Recommended patient call for any questions, concerns, or assistance needed.   "

## 2021-01-04 LAB
LAB AP CASE REPORT: NORMAL
LAB AP CLINICAL INFORMATION: NORMAL
PATH REPORT.FINAL DX SPEC: NORMAL
PATH REPORT.GROSS SPEC: NORMAL

## 2021-01-05 ENCOUNTER — TELEPHONE (OUTPATIENT)
Dept: LABOR AND DELIVERY | Facility: HOSPITAL | Age: 31
End: 2021-01-05

## 2021-01-05 NOTE — TELEPHONE ENCOUNTER
Infant:  Leida  :  20    Mother reports no problems with bfing. Denies nipple damage or pain. Praised her for successful bfing. Urged bfing for 6-12 months. Offered services as needed.

## 2021-11-23 ENCOUNTER — OFFICE VISIT (OUTPATIENT)
Dept: INTERNAL MEDICINE | Facility: CLINIC | Age: 31
End: 2021-11-23

## 2021-11-23 VITALS
RESPIRATION RATE: 18 BRPM | HEART RATE: 99 BPM | TEMPERATURE: 98.6 F | DIASTOLIC BLOOD PRESSURE: 78 MMHG | SYSTOLIC BLOOD PRESSURE: 110 MMHG | OXYGEN SATURATION: 97 %

## 2021-11-23 DIAGNOSIS — R05.9 COUGH: ICD-10-CM

## 2021-11-23 DIAGNOSIS — J01.00 ACUTE MAXILLARY SINUSITIS, RECURRENCE NOT SPECIFIED: ICD-10-CM

## 2021-11-23 DIAGNOSIS — R50.9 FEVER, UNSPECIFIED FEVER CAUSE: Primary | ICD-10-CM

## 2021-11-23 PROCEDURE — 87635 SARS-COV-2 COVID-19 AMP PRB: CPT | Performed by: NURSE PRACTITIONER

## 2021-11-23 PROCEDURE — 99203 OFFICE O/P NEW LOW 30 MIN: CPT | Performed by: NURSE PRACTITIONER

## 2021-11-23 RX ORDER — LEVOFLOXACIN 500 MG/1
500 TABLET, FILM COATED ORAL DAILY
Qty: 7 TABLET | Refills: 0 | Status: SHIPPED | OUTPATIENT
Start: 2021-11-23 | End: 2022-05-31

## 2021-11-23 RX ORDER — DESOGESTREL AND ETHINYL ESTRADIOL 0.15-0.03
1 KIT ORAL DAILY
COMMUNITY
Start: 2021-11-12 | End: 2022-05-31

## 2021-11-23 RX ORDER — BENZONATATE 200 MG/1
200 CAPSULE ORAL 3 TIMES DAILY PRN
Qty: 21 CAPSULE | Refills: 0 | Status: SHIPPED | OUTPATIENT
Start: 2021-11-23 | End: 2022-05-31

## 2021-11-23 NOTE — PROGRESS NOTES
Subjective     Chief Complaint   Patient presents with   • Cough     2 weeks   • Nasal Congestion   • Fever     last night        History of Present Illness  Pt comes in today complaining of cough, nasal congestion, and fever. This started two weeks ago. She states yesterday was the worst she has felt. The highest her fever has been was 100.5. She is coughing some sputum up, but does not know the color because she hasn't been looking. Admits to having chills. No loss of taste or smell. She states she did have COVID last October, but never got tested. Then, she had loss of taste or smell. She assumes it was COVID. States this feels different. She has been COVID vaccinated. She has been having some sinus and jaw pain. No sore throat.     Review of Systems   Otherwise complete ROS reviewed and negative except.    Past Medical History:   Past Medical History:   Diagnosis Date   • Anemia    • Chronic kidney disease     stones   • Kidney stone     [facility] in    • Migraine     Stress related   • Ovarian cyst      Past Surgical History:  Past Surgical History:   Procedure Laterality Date   •  SECTION N/A 10/2/2017    Procedure:  SECTION PRIMARY;  Surgeon: Cecily Riggs MD;  Location: [facility] LABOR DELIVERY;  Service:    •  SECTION N/A 2020    Procedure: REPEAT  SECTION WITHOUT TUBAL LIGATION;  Surgeon: Cecily Riggs MD;  Location: [facility] LABOR DELIVERY;  Service: Obstetrics/Gynecology;  Laterality: N/A;   • CLEFT LIP REPAIR     • CLEFT PALATE REPAIR      8 surgeries   • HAND OPEN REDUCTION INTERNAL FIXATION     • KNEE ARTHROSCOPY MENISCUS TRANSPLANT Right    • KNEE SURGERY      piece of femur broke off into knee     Social History:  reports that she has never smoked. She has never used smokeless tobacco. She reports that she does not drink alcohol and does not use drugs.    Family History: family history includes Breast cancer in her paternal grandmother;  Hypertension in her father; Melanoma in her paternal grandmother; Prostate cancer in her paternal grandfather.       Allergies:  Allergies   Allergen Reactions   • Amoxicillin      reaction as a baby, pt told by mother not to take amoxicillin.   • Imitrex [Sumatriptan] Nausea And Vomiting and Nausea Only   • Penicillins Other (See Comments)     Makes her crazy - mean.      Medications:  Prior to Admission medications    Medication Sig Start Date End Date Taking? Authorizing Provider   Quyen 0.15-30 MG-MCG per tablet Take 1 tablet by mouth Daily. 11/12/21  Yes Alberto Galeas MD   ferrous sulfate 325 (65 FE) MG tablet Take 325 mg by mouth 3 (Three) Times a Day With Meals.    Alberto Galeas MD   ibuprofen (ADVIL,MOTRIN) 800 MG tablet Take 1 tablet by mouth Every 8 (Eight) Hours As Needed for Mild Pain  or Moderate Pain  (DO NOT START UNTIL COMPLETION OF TORADOL). 1/1/21   Cecily Riggs MD   omeprazole (priLOSEC) 20 MG capsule Take 20 mg by mouth Daily.    Alberto Galeas MD   Prenatal Vit-Fe Fumarate-FA (PRENATAL, CLASSIC, VITAMIN) 28-0.8 MG tablet tablet Take 1 tablet by mouth Daily.    Alberto Galeas MD       Objective     Vital Signs: /78   Pulse 99   Temp 98.6 °F (37 °C)   Resp 18   SpO2 97%   Breastfeeding No   Physical Exam  Vitals reviewed.   Constitutional:       Appearance: She is well-developed.   HENT:      Head: Normocephalic and atraumatic.      Right Ear: Tympanic membrane is bulging.      Left Ear: Tympanic membrane is bulging.      Mouth/Throat:      Pharynx: Posterior oropharyngeal erythema present.      Comments: Maxillary sinus tenderness.   Eyes:      Pupils: Pupils are equal, round, and reactive to light.   Neck:      Vascular: No JVD.   Cardiovascular:      Rate and Rhythm: Normal rate and regular rhythm.      Heart sounds: Normal heart sounds.   Pulmonary:      Effort: Pulmonary effort is normal.      Breath sounds: Normal breath sounds.    Abdominal:      General: Bowel sounds are normal.      Palpations: Abdomen is soft.   Musculoskeletal:         General: No deformity.      Cervical back: Normal range of motion and neck supple.   Lymphadenopathy:      Cervical: No cervical adenopathy.   Skin:     General: Skin is warm and dry.   Neurological:      Mental Status: She is alert and oriented to person, place, and time.   Psychiatric:         Behavior: Behavior normal.         Thought Content: Thought content normal.         Judgment: Judgment normal.       Results Reviewed:  ALT (SGPT)   Date Value Ref Range Status   12/21/2020 13 1 - 33 U/L Final     AST (SGOT)   Date Value Ref Range Status   12/21/2020 25 1 - 32 U/L Final     WBC   Date Value Ref Range Status   12/31/2020 9.90 3.40 - 10.80 10*3/mm3 Final     Hematocrit   Date Value Ref Range Status   12/31/2020 31.5 (L) 34.0 - 46.6 % Final     Platelets   Date Value Ref Range Status   12/31/2020 190 140 - 450 10*3/mm3 Final         Assessment / Plan     Assessment/Plan:  Diagnoses and all orders for this visit:    1. Fever, unspecified fever cause (Primary)  -     COVID-19,[employer] IN-HOUSE,Nasal Swab No Transport Media 3-4 HR TAT - Swab, Nasal Cavity    2. Acute maxillary sinusitis, recurrence not specified  -     levoFLOXacin (Levaquin) 500 MG tablet; Take 1 tablet by mouth Daily.  Dispense: 7 tablet; Refill: 0    3. Cough  -     benzonatate (TESSALON) 200 MG capsule; Take 1 capsule by mouth 3 (Three) Times a Day As Needed for Cough.  Dispense: 21 capsule; Refill: 0      Return if symptoms worsen or fail to improve. unless patient needs to be seen sooner or acute issues arise.    Code Status: Full.    I have discussed the patient results/orders and and plan/recommendation with them at today's visit.      Cesilia Benítez, APRN   11/23/2021

## 2021-11-24 LAB — SARS-COV-2 RNA PNL SPEC NAA+PROBE: NOT DETECTED

## 2021-12-08 ENCOUNTER — E-VISIT (OUTPATIENT)
Dept: FAMILY MEDICINE CLINIC | Facility: TELEHEALTH | Age: 31
End: 2021-12-08

## 2021-12-08 PROCEDURE — BRIGHTMDVISIT: Performed by: NURSE PRACTITIONER

## 2021-12-09 NOTE — EXTERNAL PATIENT INSTRUCTIONS
Note   Hi Carolina, We need to try treating this without antibiotics as it is noted in your history that you were seen 11/23 and treated with a strong antibiotics. Antibiotics kill off your good gut bacteria and can cause diarrhea and cdiff. It is also noted that your eardrums were bulging at that visit and I know this is still causing you discomfort. The above medications will hopefully take care of this for you.   Diagnosis   Eustachian tube dysfunction   My name is Vita Brandon. I'm a healthcare provider at [facility].   I've reviewed your interview, and I believe your symptoms are caused by eustachian tube dysfunction (ETD), not a bacterial infection. ETD usually isn't serious, and it generally gets better on its own with time.   Medications   Non-prescription      Cetirizine (10mg): Take 1 tablet by mouth daily as needed for 10 days.      Fluticasone (50mcg): Spray 2 sprays in each nostril daily for 14 days for ear discomfort or allergies. Fluticasone needs to be used every day to work. It may take up to a week for the full effects of the medication to be seen.      Ibuprofen (200mg): Take 2 tablets by mouth every 4 hours for 10 days as needed for any fever, pain, or discomfort associated with your condition. Take with food to avoid upset stomach. Do not exceed 3200 mg (16 tablets) in a 24-hour period.    Don't take ibuprofen with other non-steroidal anti-inflammatory drugs (NSAIDs), including naproxen or aspirin. Taking these medications together can increase the risk of serious side effects.    You mentioned that you've recently had cold or allergy symptoms. I've recommended medications to help with these symptoms.   About your diagnosis   The middle ear is connected to the nose and throat by the eustachian tube. The eustachian tube keeps the air pressure inside the middle ear equal to the air pressure outside the body. Sometimes the eustachian tube gets swollen or blocked, such as during a cold,  allergy, or sinus infection. When this happens, the eustachian tube can't keep the pressure equal. This difference in pressure can cause a blocked or plugged feeling in the ear.   Key things to know about ETD:    You mentioned that you've recently had nasal congestion or other cold-like symptoms. Nasal congestion is a very common cause of eustachian tube dysfunction.    Acid reflux is a condition that causes heartburn. It happens when stomach acid moves up the esophagus and even into the throat. Your ETD could be related to your symptoms of acid reflux.   What to expect   If you follow the advice in this treatment plan, you should feel better within 1 to 2 days.   When to seek care   Call us at [phone]   with any sudden or unexpected symptoms.    Pain that doesn't start to improve within a few days.    Hearing loss that worsens or doesn't improve.    New or bloody drainage from your ear.    Pain, tenderness, redness, or swelling on the bony part behind your ear along with a fever.    A fever that's over 103F or continues for more than 4 days.    Sudden difficulty standing up or walking.   Other treatment   Some simple self-care steps can open your eustachian tube(s) and make your ear pressure equal again. Try chewing gum, sucking on candy, exhaling with your mouth closed and your nose pinched shut, or yawning.   Smoke or air pollution can cause eustachian tube dysfunction or make it worse. Try to avoid exposure to tobacco smoke, smoke from a fire or stove, and air pollution while you heal.   Prevention   Try to avoid air travel when you have symptoms of a cold, sinus infection, or allergies. Consider taking a non-prescription nasal decongestant, antihistamine, or ibuprofen before air travel and other significant altitude changes. Don't take decongestants before scuba diving.   Your provider   Your diagnosis was provided by Vita Brandon, a member of your trusted care team at [facility].   If you have any  questions, call us at [phone]  .

## 2021-12-09 NOTE — E-VISIT TREATED
Chief Complaint: Ear pain   Patient introduction   Patient is 31-year-old female who reports an ache and pressure in right ear.   Patient did not request an excuse note.   General presentation   Patient first noticed symptoms yesterday. They came on suddenly. Symptoms come and go. Patient rates their pain as moderate (4-6/10).   Patient denies fever.   No symptoms of tinnitus. Reports mild hearing loss in both ears. There has been no drainage.   Denies history of PE tubes.   Denies either ear is red, swollen, warm to the touch, or painful to the touch.   Patient also reports:    Respiratory symptoms, including sinus pain or pressure, cough, and scratchy throat. Reports sinus/nasal symptoms for 3-5 days. Patient denies improvement of symptoms.    Acid reflux symptoms, including sore throat, hoarse voice, and difficulty swallowing but no history of GERD.    Headache pain that is severe.    Mild dizziness that does not affect patient's ability to stand or walk.   Denies recent airplane travel, scuba diving, hiking or driving in the mountains, or exposure to a loud blast or explosion. Denies swimming in the last few weeks. No recent exposure to tobacco smoke, smoke from a fire or wood-burning stove, or air pollution. Denies regularly using hearing aids, earbuds or in-ear headphones, swim caps, cotton swabs, or ear canal irrigation kits.   Denies the following red flags:    Nuchal rigidity accompanied by fever    Current treatment by ENT    Current PE tubes in affected ear(s)    Mastoid or ear surgery in the last 5 years    Ear swelling, or mastoid redness, warmth, or pain    Sharp or pointed object in ear canal    Foreign body in ear    Recent head trauma    Symptoms of periauricular cellulitis or perichondritis    Severe dizziness or vertigo    Hearing loss in both ears    Recent jaw trauma    Recent jaw dislocation    Recent dental work    Recent dental infection or abscess   Pregnancy/menstrual  status/breastfeeding:   Denies being pregnant. Denies breastfeeding. Regarding last menstrual period, patient writes: Now. Started 12/7.   Self-exam: Patient reports:    No difficulty moving their chin toward their chest    No ear swelling, or mastoid redness, warmth, or pain    Pain is exacerbated by chewing or moving the jaw    Pain is exacerbated by manipulation of the pinna and/or pressure on the tragus    Pain is worse when lying down   Current medications   Patient has used the following OTC medication(s) for their ear symptoms: acetaminophen.   Reports taking Juleber and Flonase Nasal Product.   Medication allergies    Penicillin   Medication contraindication review   Denies history of arrhythmia, benign prostatic hypertrophy, congestive heart failure, recent myocardial infarction, heart block, heart disease, hypertension, hyperthyroidism, mononucleosis, and myasthenia gravis.   No known history of amoxicillin-clavulanate-associated jaundice or hepatic impairment.   No known history of azithromycin-associated cholestatic jaundice or hepatic impairment.   Denies history of aspirin triad; NSAID-induced asthma/urticaria; coronary artery disease; coagulation disorder; urinary retention; electrolyte abnormalities; G6PD deficiency; GI bleeding; hypertension; influenza, varicella, or febrile viral infection; or CABG surgery.   Past medical history   Immune conditions: Denies immunocompromising conditions. Denies history of cancer.   Assessment   Eustachian tube dysfunction.   Plan   Medications:    cetirizine 10 mg tablet OTC 10mg 1 tab PO qd PRN 10d Amount is 20 tab.    fluticasone 50 mcg/actuation nasal spray,suspension OTC 50mcg 2 sprays each nostril nasal qd 14d for ear discomfort or allergies. Fluticasone needs to be used every day to work. It may take up to a week for the full effects of the medication to be seen. Amount is 16 g.    ibuprofen 200 mg tablet OTC 200mg 2 tabs PO q4h 10d PRN for any fever, pain,  or discomfort associated with your condition. Take with food to avoid upset stomach. Do not exceed 3200 mg (16 tablets) in a 24-hour period. Amount is 120 tab.   No prescriptions were ordered to treat this patient. The patient selected the following pharmacy during their interview, but no prescriptions were sent:   [facility]   [address] [place] [id number]   Phone: [phone]     Fax: [phone]   Patient informed to purchase OTC medications.   Education:    Condition and causes    Prevention    Treatment and self-care    When to call provider      ----------   Electronically signed by YULIANA Clarke on 2021-12-08 at 23:42PM   ----------   Patient Interview Transcript:   How would you describe your ear pain or discomfort? Scroll to see all options. Select all that apply.    Achy    Pressure   Not selected:    Itchy    Blocked or plugged    Full    Crackling or popping    Throbbing    Shooting/stabbing/sharp   On a scale of 1 to 10, how severe is your ear pain? Select one.    Moderate (4 to 6); it's pretty uncomfortable   Not selected:    Mild (1 to 3); it bothers me a little bit    Moderate to severe (7 to 9); it's intense    Very severe; it's unbearable (10+)   Which ear is affected? Select one.    My right ear   Not selected:    My left ear    Both of my ears   When did you first notice this ear pain or discomfort? Select one.    Yesterday   Not selected:    Today    Within the last 3 days    Within the last 4 to 5 days    More than 5 days ago   Did your ear pain or discomfort come on suddenly or over time? Select one.    Suddenly   Not selected:    Over time    I'm not sure   Is the outside of the affected ear red, swollen, warm to the touch, or painful to the touch? Select all that apply.    None of these   Not selected:    Red    Swollen    Warm to the touch    Painful to the touch   Is your ear pain or discomfort always there, or does it come and go? Select one.    Comes and goes   Not  selected:    Always there    I'm not sure   Does the pain or discomfort get worse when you bite or chew? Select one.    Yes   Not selected:    No    I'm not sure   Along with your ear symptoms, have you had a fever or felt hot? Select one.    No   Not selected:    Yes   Do you have any of these on the same side as your affected ear?    None of the above   Not selected:    Pain or tenderness over the bone behind your ear    Redness over the bone behind your ear    Warmth over the bone behind your ear    Swelling of the ear itself   Do your symptoms include the sudden onset of ringing in your ear(s)? Select one.    No   Not selected:    Yes   Do your symptoms include hearing loss? Select one.    Yes, I can't hear as well as I usually do   Not selected:    Yes, I can't hear at all in either ear    No   Is your hearing loss in one ear or both ears? Select one.    Both ears   Not selected:    One ear   Has there been drainage coming out of your ears? Select one.    No   Not selected:    Yes, but just the usual amount for me    Yes, more than the usual amount for me   Along with your ear symptoms, have you had any of these cold symptoms? Select all that apply.    Sinus pain or pressure    Cough    Scratchy throat   Not selected:    Runny nose    Stuffy nose (nasal congestion)    Postnasal drip    None of the above   How long have you had these nasal or sinus symptoms? Select one.    3 to 5 days   Not selected:    Less than 48 hours    6 to 9 days    10 to 14 days    2 to 4 weeks    1 month or longer   Have your nasal or sinus symptoms improved at all since they began? Select one.    No   Not selected:    Yes, but they haven't gone away completely    Yes, but then they came back worse than before    I'm not sure   Along with your ear symptoms, do you have any of these throat or digestion symptoms? Select all that apply.    Sore throat or feeling of a lump in your throat    Hoarse voice    Difficulty swallowing   Not  selected:    Burning feeling in your chest (heartburn)    Swallowed food or liquids getting stuck and coming back up    None of these   Along with your ear symptoms, have you had a headache? Select one.    Yes, and the pain is severe   Not selected:    Yes, and the pain is mild to moderate    Yes, and the pain is unbearable    Yes, and it's the worst headache of my life    No   Along with your ear symptoms, have you had any vision changes? Select one.    No   Not selected:    Yes    I'm not sure   Along with your ear symptoms, have you felt dizzy or had vertigo? This includes feeling like you're spinning, swaying, or tilting; feeling light-headed; or feeling like the room is moving around you. Select one.    Yes   Not selected:    No   Has your dizziness or vertigo been so severe that you can't walk without assistance? Select one.    No   Not selected:    Yes   Do your symptoms include vomiting? Select one.    No   Not selected:    Yes   Are you able to open your mouth as wide as you normally can? Select one.    Yes   Not selected:    No, it's painful    No, my jaw seems to get stuck before I open it all the way    No, it's painful, and my jaw seems to get stuck before I open it all the way    I'm not sure   Do you have any difficulty closing your mouth? This includes feeling pain when you try to close your mouth, or feeling as if your jaw is locked or stuck open. Select one.    No   Not selected:    Yes    I'm not sure   When you open or close your mouth, do you notice any clicking, creaking, or popping in the jaw area? Select one.    Yes   Not selected:    No    I'm not sure   On the side where you're having pain, gently press on your jaw joint and the surrounding areas of your face. Do you feel pain or tenderness?    Yes   Not selected:    No    I'm not sure   Can you move your chin toward your chest? Select one.    Yes   Not selected:    No, my neck is too stiff   Does your ear pain or discomfort get worse if  you do either of these: 1. Pull the cartilage part of your ear up and back 2. Push on your tragus (the flesh in front of your ear opening)    Yes   Not selected:    No    I'm not sure   Take a moment and lie down. Does your ear pain or discomfort feel worse when you lie down? Select one.    Yes   Not selected:    No   Right before your symptoms started, did you put anything sharp or pointed into your ear canal? Select one.    No   Not selected:    Yes   Is it possible that you have something stuck in your ear canal? Examples include a bead, button, rock, or part of an earbud. Select one.    No   Not selected:    Yes   Right before your ear pain began, did you have a severe head or ear injury? Examples include: - A blow to your head or ear - Hitting your head or ear on a hard surface - Falling on your ear while skateboarding or skiing - A motor vehicle accident - A sports injury, such as in wrestling - Penetrating trauma, such as a knife wound Select one.    No   Not selected:    Yes   Before your symptoms started, did any of these happen to you? Select all that apply.    None of the above   Not selected:    Jaw trauma, such as being hit or punched in the jaw or other blunt trauma    Jaw dislocation    Dental work    Dental infection or abscess    Increase in stress   Do you have any of these habits? Select all that apply.    Nail biting   Not selected:    Lip chewing    Jaw or teeth clenching    Teeth grinding    Frequent gum chewing    None of the above   In the week before your symptoms started, did any of these apply to you? Select all that apply.    None of the above   Not selected:    Air travel    Scuba diving    Hiking or driving in the mountains    Exposed to a loud blast or explosion   In the few weeks before your symptoms started, did you go swimming or get water in your ear(s)? Select one.    No   Not selected:    Yes    I'm not sure   Have you recently been exposed to more smoke or air pollution than  usual? Select all that apply.    None of the above   Not selected:    Yes, tobacco smoke    Yes, smoke from a fire or wood-burning stove    Yes, air pollution   Do you regularly use any of these items? Select all that apply.    None of the above   Not selected:    Hearing aids    Earbuds or in-ear headphones    Swim caps    Cotton swabs to clean your ears    Earwax removal devices, such as an irrigation kit   Are you being treated by an Ear, Nose and Throat (ENT) doctor for an ear condition? Select one.    No   Not selected:    Yes   Do you have ear tubes? Some other names for ear tubes are tympanostomy tubes, ventilation tubes, or pressure equalization (PE) tubes. Select one.    No   Not selected:    Yes, in my left ear only    Yes, in my right ear only    Yes, in both ears    No, but I've had them in the past    I'm not sure   In the past 5 years, have you had mastoid surgery or ear surgery (not including ear tube placement or removal)? Select one.    No   Not selected:    Yes    I'm not sure   When was the last time you were treated with antibiotics for an ear infection? This includes both oral antibiotics and antibiotic ear drops. Select one.    I'm not sure   Not selected:    Never    Within the last month    1 to 3 months ago    4 months to a year ago    More than a year ago   In the past year, how many times have you taken oral antibiotics for an ear infection? Select one.    I'm not sure   Not selected:    1    2 or more   Have you ever been diagnosed with a temporomandibular joint disorder (TMJ or TMD)? Select one.    No   Not selected:    Yes    I'm not sure   Have you ever been diagnosed with heartburn, GERD (gastroesophageal reflux), or LPR (laryngopharyngeal reflux)? Select one.    No   Not selected:    Yes   Do you have any of these conditions that can affect the immune system? Scroll to see all options. Select all that apply.    None of these   Not selected:    History of bone marrow transplant     Chronic kidney disease    Chronic liver disease (including cirrhosis)    HIV/AIDS    Inflammatory bowel disease (Crohn's disease or ulcerative colitis)    Lupus    Moderate to severe plaque psoriasis    Multiple sclerosis    Rheumatoid arthritis    Sickle cell anemia    Alpha or beta thalassemia    History of solid organ transplant (kidney, liver, or heart)    History of spleen removal    An autoimmune disorder not listed here    A condition requiring treatment with long-term use of oral steroids (such as prednisone, prednisolone, or dexamethasone)   Have you ever been diagnosed with cancer? Select one.    No   Not selected:    Yes, I have cancer now    Yes, but I'm in remission   Are you pregnant? Select one.    No   Not selected:    Yes   When was your last menstrual period? If you don't currently have periods or no longer have periods, please briefly explain.    Now. Started 12/7   Are you breastfeeding? Select one.    No   Not selected:    Yes   The next few questions help us figure out the best treatment for you. Have you used any of these non-prescription medications for your ear symptoms? Scroll to see all options. Select all that apply.    Acetaminophen (Tylenol)   Not selected:    Carbamide peroxide otic (Auro, Debrox)    Cetirizine (Zyrtec)    Diphenhydramine (Benadryl)    Fexofenadine (Allegra)    Fluticasone (Flonase)    Ibuprofen (Advil, Motrin, Midol)    Naproxen (Aleve)    Isopropyl alcohol in glycerin ear drops (Swim Ear drops)    Loratadine (Alavert, Claritin)    Oxymetazoline (Zicam)    Phenylephrine (Sudafed)    Triamcinolone (Nasacort)    None of these   Are you taking any other medications or supplements? On the next screen, you need to list all vitamins, supplements, non-prescription medications (such as aspirin or Aleve), and prescription medications that you're taking. Select one.    Yes   Not selected:    Yes, but I'm not sure what they are    No   Have you ever had an allergic or bad  reaction to any medication? Select one.    Yes   Not selected:    No   Have you had an allergic or bad reaction to any of these medications? Scroll to see all options. Select all that apply.    Penicillin, amoxicillin, ampicillin, dicloxacillin, nafcillin, or piperacillin (Augmentin, Unasyn, Zosyn)   Not selected:    Cephalexin, cefazolin, cefdinir, cefuroxime, ceftriaxone, ceftazidime, or cefepime (Ancef, Ceftin, Fortaz, Keflex, Maxipime, Rocephin)    Corticosteroid medications, such as betamethasone, budesonide, dexamethasone, fluticasone, flunisolide, hydrocortisone, methylprednisolone, mometasone, prednisone, or prednisolone (AeroBid, Advair, Aerospan, Asmanex, Flovent, Pulmicort)    Cyclobenzaprine (Flexeril)    Ciprofloxacin, levofloxacin, moxifloxacin, or ofloxacin (Cipro, Floxin, Levaquin)    Azithromycin, clarithromycin, or erythromycin (Biaxin, Erythrocin, Pediazole, Zithromax)    Naproxen (Aleve)    Doxycycline, minocycline, or tetracycline (Declomycin, Dynacin, Panmycin)    Fluconazole, itraconazole, or terconazole (Diflucan, Sporanox, Terazol)    None of these   Have you had an allergic or bad reaction to benzonatate (Tessalon Perles)? Select one.    No   Not selected:    Yes    I'm not sure   Have you had an allergic or bad reaction to any of these non-prescription medications? Scroll to see all options. Select all that apply.    None of these   Not selected:    Acetaminophen (Tylenol)    Aspirin    Carbamide peroxide (Auro, Debrox)    Cetirizine (Zyrtec)    Chlorpheniramine (Aller-chlor, Chlor-Trimeton)    Dextromethorphan (Delsym, Zicam)    Diphenhydramine (Benadryl)    Fexofenadine (Allegra)    Fluticasone (FLONASE Allergy Relief)    Guaifenesin (Mucinex, Robitussin)    Guaifenesin/dextromethorphan (Mucinex DM, Robitussin DM)    Ibuprofen (Advil, Motrin, Midol)    Isopropyl alcohol in glycerin ear drops (Swim Ear drops)    Loratadine (Alavert, Claritin)    Omeprazole (Prilosec)    Oxymetazoline  (Zicam)    Pantoprazole (Protonix)    Phenylephrine (Sudafed PE)    Triamcinolone (Nasacort)   Are you currently being treated for any of these conditions? Scroll to see all options. Select all that apply.    None of the above   Not selected:    Arrhythmia    Congestive heart failure    Recent heart attack    Heart block    Heart disease    Hypertension    Hyperthyroidism    Mononucleosis    Myasthenia gravis   Have you ever had jaundice or liver problems as a result of taking amoxicillin-clavulanate (Augmentin)? Select all that apply.    No, not that I know of   Not selected:    Yes, jaundice    Yes, liver problems   Have you ever had jaundice or liver problems as a result of taking azithromycin (Zithromax, Zmax)? Select all that apply.    No, not that I know of   Not selected:    Yes, jaundice    Yes, liver problems   Have you had any of these conditions? Scroll to see all options. Select all that apply.    None of the above   Not selected:    Aspirin triad (also known as Samter's triad or ASA triad)    Asthma or hives from taking aspirin or other NSAIDs, such as ibuprofen or naproxen    Blockage or narrowing of the blood vessels of the heart    Coagulation disorder    Difficulty urinating or completely emptying your bladder    Electrolyte abnormalities    G6PD deficiency    Gastrointestinal (GI) bleeding    High blood pressure    Influenza, chickenpox, or a viral infection with fever    Recent coronary artery bypass graft (CABG) surgery   Have you taken any monoamine oxidase inhibitor (MAOI) medications within the last 14 days? Examples include rasagiline (Azilect), selegiline (Eldepryl, Zelapar), isocarboxazid (Marplan), phenelzine (Nardil), and tranylcypromine (Parnate). Select one.    No   Not selected:    Yes    I'm not sure   Do you need a doctor's note? A doctor's note confirms that you received care today and states when you can return to school or work. It does not contain information about your diagnosis  or treatment plan. Your provider will make the final decision on whether to give you a doctor's note. Doctor's notes CANNOT be backdated. Select one.    No   Not selected:    Today only (1 day)    Today and tomorrow (2 days)    3 days   Is there anything else you'd like to tell us about your symptoms?   The patient did not enter any additional information.   ----------   Medical history   The following information was received from the EMR on December 08, 2021.   Allergies:    AMOXICILLIN   - Allergy Type: medication   - Reaction:   - Severity:   - Status: active    IMITREX [SUMATRIPTAN]   - Allergy Type: medication   - Reaction: Nausea And Vomiting, Nausea Only   - Severity:   - Status: active    PENICILLINS   - Allergy Type: medication   - Reaction: Other (See Comments)   - Severity:   - Status: active   Medications:    - Route: Oral   - Start Date: August 07, 2017   - End Date: None   - Status: active    - Route: Oral   - Start Date: November 23, 2021   - End Date: None   - Status: active    - Route: Oral   - Start Date: July 12, 2017   - End Date: None   - Status: active    - Route: Oral   - Start Date: August 07, 2017   - End Date: None   - Status: active    - Route: Oral   - Start Date: January 01, 2021   - End Date: None   - Status: active    - Route: Oral   - Start Date: November 23, 2021   - End Date: None   - Status: active    - Route: Oral   - Start Date: November 23, 2021   - End Date: None   - Status: active   Problem list:    Diagnosis unknown   - Category: Problem List Item   - Health Status:   - Start Date: July 09, 2017   - End Date: October 02, 2017   - Status: resolved    Anemia   - Category: Problem List Item   - Health Status:   - Start Date: July 13, 2017   - End Date: October 02, 2017   - Status: resolved    Non-reassuring electronic fetal monitoring tracing   - Category: Problem List Item   - Health Status:   - Start Date: August 07, 2017   - End Date: October 02, 2017   - Status: resolved     Pregnancy   - Category: Problem List Item   - Health Status:   - Start Date: 2017   - End Date: 2017   - Status: resolved    Normal labor   - Category: Problem List Item   - Health Status:   - Start Date: 2017   - End Date: 2017   - Status: resolved    Pregnancy   - Category: Problem List Item   - Health Status:   - Start Date: 2020   - End Date: 2020   - Status: resolved    Maternal care due to low transverse uterine scar from previous  delivery   - Category: Problem List Item   - Health Status:   - Start Date: 2020   - End Date: 2021   - Status: resolved     delivery delivered   - Category: Problem List Item   - Health Status:   - Start Date: 2020   - End Date: None   - Status: active

## 2021-12-13 RX ORDER — DOXYCYCLINE HYCLATE 100 MG/1
100 CAPSULE ORAL 2 TIMES DAILY
Qty: 20 CAPSULE | Refills: 0 | Status: SHIPPED | OUTPATIENT
Start: 2021-12-13 | End: 2022-05-31

## 2022-03-25 ENCOUNTER — E-VISIT (OUTPATIENT)
Dept: FAMILY MEDICINE CLINIC | Facility: TELEHEALTH | Age: 32
End: 2022-03-25

## 2022-03-25 PROCEDURE — 99421 OL DIG E/M SVC 5-10 MIN: CPT | Performed by: NURSE PRACTITIONER

## 2022-03-25 NOTE — E-VISIT TREATED
Chief Complaint: Coronavirus (COVID-19), cold, sinus pain, allergy, or flu   Patient introduction   Patient is 31-year-old female who reports congestion, rhinitis, and headache that started 6-9 days ago.   When asked why they're seeking care online today, patient reports they just want to feel better.   Patient has not requested COVID testing.   COVID-19 exposure, testing history, and vaccination status:    No known exposure to a confirmed or suspected case of COVID-19.    No recent travel outside of their local community.    Patient had a viral lab test > 3 months ago. Test result was negative.    Reports receiving 2 doses of the COVID-19 vaccine (Moderna, Moderna). Received their most recent dose of the vaccine > 14 days ago.   Risk factors for severe disease from COVID-19 infection:    BMI >= 25    A mood disorder   Patient-submitted comments: Sinus pain is only on the right side.   Patient did not request an excuse note.   General presentation   Patient denies improvement of symptoms.   Fever:    Denies fever.   Sinus and nasal symptoms:    Reports rhinitis.    Reports yellow nasal drainage.    Nasal drainage is thick.    Reports postnasal drip.    Reports 1-3 episodes of antibiotic treatment for sinus infection in the last year.    Reports congestion with sinus pain or pressure on or around their eyes, cheeks, and upper teeth or jaw.    Patient first noticed sinus pain < 5 days ago.    Sinus pain is worse with Valsalva.    Denies itchy nose or sneezing.    Denies history of unhealed nasal septal ulcer/nasal wound.   Sore throat:    Denies sore throat.   Head and body aches:    Reports headache, described as moderate (4-6 on a scale of 1-10).    Denies sweats.    Denies chills.    Denies myalgia.    Denies fatigue.   Cough:    Denies cough.   Wheezing and SOB:    Denies COPD diagnosis.    Denies asthma diagnosis.    Denies wheezing.    Denies shortness of breath.    Denies previous albuterol inhaler use  during URIs, bronchitis, or pneumonia.    Denies previous steroid inhaler use during URIs, bronchitis, or pneumonia.   Chest pain:    Denies chest pain.   Ear pressure/pain:    Reports pain, pressure, fullness, and a plugged or blocked sensation.   Dizziness:    Reports mild dizziness that does not interfere with daily activities.   Allergies:    Reports history of allergies.    Patient thinks symptoms are allergy-related.    Patient has known seasonal allergies.   Flu exposure:    Denies recent exposure to confirmed flu diagnosis.    Denies receiving a flu vaccine this season.   Patient denies the following red flags:    Changes in alertness or awareness    Symptoms suggesting intracranial hemorrhage    Decreased urination   Pregnancy/menstrual status/breastfeeding:    Denies being pregnant    Denies breastfeeding    Regarding last menstrual period, patient writes: January 2022. I switched to a 24 day birth control and haven't had a period since.   Self-exam:    No difficulty moving their chin toward their chest    Denies antibiotic treatment for similar symptoms within the past month   Current medications   Reports taking over-the-counter medication for current symptoms. Patient has taken fluticasone, ibuprofen, and phenylephrine.   Reports taking sertraline 25 MG [Zoloft] and Minastrin 24 Fe.   Medication allergies    Penicillins   Medication contraindication review   Reports history positive for depression. Therefore, the following medication(s) will not be prescribed:    Metoclopramide   Denies history of metoclopramide-associated dystonic reaction and tardive dyskinesia.   No known history of azithromycin-associated cholestatic jaundice or hepatic impairment.   Past medical history   Immune conditions: Denies immunocompromising conditions. Denies history of cancer.   Social history   High-risk household contacts: Patient's household includes one or more members of a group with risk factors for influenza  complications, including a child < 5 years.   Non-smoker.   Assessment   Bacterial sinusitis. Ruled out: Traumatic laryngitis.   This is the likely diagnosis based on patient's interview responses, including:    Congestion or sinus pressure    Thick nasal discharge    Yellow or green mucus    Duration of symptoms > 5 days    Sinus pressure < 5 days    No improvement of symptoms   HHS required information for COVID-19 lab data reporting (if test is ordered):   Symptoms:    Headache    Nasal congestion    Rhinitis   Symptom onset: 6-9 days ago ago  Pregnancy: No or N/A  Healthcare worker? No  Resident in a congregate care setting? No  Previous history of COVID-19 testing: Patient had a viral lab test > 3 months ago. Test result was negative.     Plan   Medications:    Mucus Relief  mg tablet, extended release RX 600mg 1 tab PO q12h PRN 7d for cough and congestion. Amount is 14 tab.    doxycycline monohydrate 100 mg tablet RX 100mg 1 tab PO bid 10d for infection. This medication is an antibiotic. Take it exactly as directed. You must finish the entire course of medication, even if you feel better after the first few days of treatment. Amount is 20 tab.   The patient's prescriptions will be sent to:   [facility]   [address] [place] [id number]   Phone: [phone]     Fax: [phone]   Education:    Condition and causes    Prevention    Treatment and self-care    When to call provider      ----------   Electronically signed by YULIANA Galindo on 2022-03-25 at 17:40PM   ----------   Patient Interview Transcript:   Why are you getting care through eVisit today? We can't guarantee a specific treatment or test. Your provider will decide what's best for you. Select all that apply.    I just want to feel better!   Not selected:    I want to know if I have a cold or something more serious    I want to know if I need to be seen by a provider    I need a doctor's note    I want to be tested for COVID-19    I  want to get the COVID-19 vaccine    I think I'm having side effects from the COVID-19 vaccine    None of the above   COVID-19 symptoms are similar to symptoms of other illnesses. This makes it difficult to diagnose. Please carefully consider each question and answer as best you can. This will help your provider make the right diagnosis. Which of these symptoms are bothering you? Select all that apply.    Stuffed-up nose or sinuses    Runny nose    Headache   Not selected:    Cough    Shortness of breath    Fever    Itchy or watery eyes    Itchy nose or sneezing    Loss of smell or taste    Sore throat    Hoarse voice or loss of voice    Sweats    Chills    Muscle or body aches    Fatigue or tiredness    Nausea or vomiting    Diarrhea    I don't have any of these symptoms   Do you have any of these conditions? These conditions can put you at increased risk for severe disease if you're infected with COVID-19. Select all that apply.    Mood disorder, including depression or schizophrenia spectrum disorders   Not selected:    Chronic lung disease, such as cystic fibrosis or interstitial fibrosis    Heart disease, such as congenital heart disease, congestive heart failure, or coronary artery disease    Disorder of the brain, spinal cord, or nerves and muscles, such as dementia, cerebral palsy, epilepsy, muscular dystrophy, or developmental delay    Metabolic disorder or mitochondrial disease    Cerebrovascular disease, such as stroke or another condition affecting the blood vessels or blood supply to the brain    Down syndrome    Substance use disorder, such as alcohol, opioid, or cocaine use disorder    Tuberculosis    None of the above   Do you live in a group care setting? Examples include: - Nursing home - Residential care - Psychiatric treatment facility - Group home - [facility] - Board and care home - Homeless shelter - Foster care setting Select one.    No   Not selected:    Yes   Have you ever been tested for  COVID-19? Select one.    Yes   Not selected:    No   When was your most recent COVID-19 test? Select one.    More than 3 months ago   Not selected:    Within the last week (specify date in MM/DD/YYYY format)    7 to 14 days ago    15 to 30 days ago    1 to 3 months ago   What type of COVID-19 test did you most recently have? There are two types of COVID-19 tests: - Viral tests check if you're currently infected with COVID-19. For these tests, a nose swab or saliva sample is taken. Viral tests include self-tests and tests done at a doctor's office, lab, or testing site. - Antibody tests check if you've been infected in the past. For these tests, your blood is drawn. Antibody tests can only be done at a doctor's office, lab, or testing site. Select one.    Viral test at a doctor's office, lab, or testing site   Not selected:    Viral self-test    Antibody test   What was the result of your most recent COVID-19 test? Select one.    Negative   Not selected:    Positive    I'm not sure   Have you gotten the COVID-19 vaccine? Select one.    Yes   Not selected:    No   How many doses of the COVID-19 vaccine have you gotten? This includes boosters. Select one.    2 doses   Not selected:    1 dose    3 doses   Which COVID-19 vaccine did you get for your first dose? Check your Vaccination Record Card under Product Name/. Select one.    Moderna   Not selected:    Gagan & Gagan's Harriett Vaccine (J&J/Harriett)    Pfizer-BioNTech (Pfizer)   Which COVID-19 vaccine did you get for your second dose? Check your Vaccination Record Card under Product Name/. Select one.    Moderna   Not selected:    Gagan & Gagan's Harriett Vaccine (J&J/Harriett)    Pfizer-BioNTech (Pfizer)   When did you get your most recent dose of the COVID-19 vaccine?    More than 14 days ago   Not selected:    Less than 48 hours (2 days) ago    48 to 72 hours (3 days) ago    3 to 5 days ago    5 to 7 days ago    7 to 14 days ago   In  "the last 14 days, have you traveled outside of your local community? This includes travel by car, RV, bus, train, or plane. Travel increases your chances of getting and spreading COVID-19. Select one.    No   Not selected:    Yes   In the last 14 days, have you had close contact with someone who has coronavirus (COVID-19)? \"Close contact\" means any of these: - Living in the same household as someone with COVID-19. - Caring for someone with COVID-19. - Being within 6 feet of someone with COVID-19 for a total of at least 15 minutes over a 24-hour period. For example, three 5-minute exposures for a total of 15 minutes. - Being in direct contact with respiratory droplets from someone with COVID-19 (being coughed on, kissing, sharing utensils). Select one.    No, not that I know of   Not selected:    Yes, a confirmed case    Yes, a suspected case   When did your current symptoms start? If you know the exact date your symptoms started, choose Other and enter the month and day. Select one.    6 to 9 days ago   Not selected:    Less than 48 hours ago    3 to 5 days ago    10 to 14 days ago    2 to 4 weeks ago    More than a month ago    Other (specify)   Did your symptoms come on suddenly or gradually? Select one.    I'm not sure   Not selected:    Suddenly    Gradually   Have your symptoms improved at all since they began? Select one.    No   Not selected:    Yes, but they haven't gone away completely    Yes, but then they came back worse than before    I'm not sure   You mentioned having a headache. On a scale of 1 to 10, how severe is your headache pain? Select one.    Moderate (4 to 6)   Not selected:    Mild (1 to 3)    Severe (7 to 9)    Unbearable (10)    The worst headache of my life (10+)   You mentioned having a stuffy nose or sinus congestion. Do you feel pain or pressure in your sinuses?    Yes   Not selected:    No    I'm not sure   Where do you feel sinus pain or pressure?    Around my eyes    In my cheeks   " " In my upper teeth or jaw   Not selected:    In my forehead    Behind my nose    I'm not sure   When did you first notice your sinus pain or pressure? Select one.    Less than 5 days ago   Not selected:    5 to 9 days ago    10 to 14 days ago    2 to 4 weeks ago    1 month ago or longer   Does coughing, sneezing, or leaning forward make your sinuses feel worse? Select one.    Yes   Not selected:    No    I'm not sure   What color is your nasal drainage? Select one.    Yellow   Not selected:    Clear    White    Green    My nose is stuffed but not draining or running    I'm not sure   Is your nasal drainage thick or thin? Select one.    Thick   Not selected:    Thin    I'm not sure   Is there any drainage (mucus) going down the back of your throat? This kind of drainage is also called \"postnasal drip.\" Select one.    Yes   Not selected:    No    I'm not sure   Since your symptoms started, have you felt dizzy? Select one.    Yes, but I can continue with my regular daily activities   Not selected:    Yes, and it makes it hard to stand, walk, or do daily activities    No   Do you have chest pain? You might also feel it as discomfort, aching, tightness, or squeezing in the chest. Select one.    No   Not selected:    Yes   Have you urinated at least 3 times in the last 24 hours? Select one.    Yes   Not selected:    No    I'm not sure   Changes in alertness or awareness may mean you need emergency care. Since your symptoms started, have you had any of these? Select all that apply.    None of the above   Not selected:    Confusion    Slurred speech    Not knowing where you are or what day it is    Difficulty staying conscious    Fainting or passing out   Do your symptoms include a whistling sound, or wheezing, when you breathe? Select one.    No   Not selected:    Yes    I'm not sure   Do you have any of these symptoms in your ear(s)? Select all that apply.    Pain    Pressure    Fullness    Plugged or blocked sensation   " Not selected:    Crackling or popping    None of the above   Can you move your chin toward your chest?    Yes   Not selected:    No, my neck is too stiff   Are your glands/lymph nodes swollen, or does it hurt when you touch them?    I'm not sure   Not selected:    Yes    No   People with a very high body mass index (BMI) are at higher risk for developing complications from the flu and severe illness from COVID-19. To determine your BMI, we need to know your weight and height. Please enter your weight (in pounds).    Weight   Please enter your height.    Height   In the past week, has anyone around you (such as at school, work, or home) had a confirmed diagnosis of the flu? A confirmed diagnosis means that a nose swab was done to verify a flu infection. Select all that apply.    No   Not selected:    I live with someone who has the flu    I've been within touching distance of someone who has the flu    I've walked by, or sat about 3 feet away from, someone who has the flu    I've been in the same building as someone who has the flu    I'm not sure   Have you ever been diagnosed with asthma? Select one.    No   Not selected:    Yes   Have you ever been prescribed albuterol to use for wheezing, cough, or shortness of breath caused by a cold, bronchitis, or pneumonia? Albuterol (ProAir, Proventil, Ventolin) is prescribed as an inhaler or a solution to be used with a nebulizer machine. Select one.    No   Not selected:    Yes    I'm not sure   Have you ever been prescribed a steroid inhaler to use for wheezing, cough, or shortness of breath caused by a cold, bronchitis, or pneumonia? Some examples of steroid inhalers include Pulmicort, Flovent, Qvar, and Alvesco. Select one.    No   Not selected:    Yes    I'm not sure   Have you ever been diagnosed with chronic obstructive pulmonary disease (COPD)? Select one.    No   Not selected:    Yes    I'm not sure   In the last year, how many times were you treated with  antibiotics for a sinus infection? Select one.    1 to 3 times   Not selected:    None    4 or more times   Have you been diagnosed with a deviated septum or nasal polyps? The nose is divided into two nostrils by the septum. A crooked septum is called a deviated septum. Nasal polyps are growths inside the nose or sinuses. Select one.    I'm not sure   Not selected:    Yes, but I had surgery to treat them    Yes, I have a deviated septum    Yes, I have nasal polyps    Yes, I have a deviated septum and nasal polyps    No   Do you have a sore inside your nose that won't heal? Select one.    No   Not selected:    Yes    I'm not sure   Do you have allergies (pollen, dust mites, mold, animal dander)? Select one.    Yes   Not selected:    No    I'm not sure   What kind of allergies do you have? Select all that apply.    Seasonal allergies (hay fever)   Not selected:    Pet allergies    Dust allergies    None of the above    I'm not sure   Do you think your symptoms could be allergy-related? Select one.    Yes   Not selected:    No    I'm not sure   Have you had a flu shot this season? Select one.    No   Not selected:    Yes, less than 2 weeks ago    Yes, 2 to 4 weeks ago    Yes, 1 to 3 months ago    Yes, 3 to 6 months ago    Yes, more than 6 months ago    I'm not sure   The flu and COVID-19 can be more serious for people with certain conditions or characteristics. These questions help us figure out if you or anyone you live with is at higher risk for complications from these infections. Do either of these statements apply to you? Select all that apply.    None of the above   Not selected:    I'm  or Native [place]    I'm a healthcare worker   Do you smoke tobacco? Select one.    No, never   Not selected:    Yes, every day    Yes, some days    No, I quit   Some conditions can put you at risk for more serious infections. Do any of these apply to you? Select all that apply.    None of the above   Not  selected:    I've been hospitalized within the last 5 days    I have diabetes    I'm in close contact with a child in    Are you currently being treated for any of these conditions? Scroll to see all options. Select all that apply.    Depression   Not selected:    Aspirin triad (also known as Samter's triad or ASA triad)    Asthma or hives from taking aspirin or other NSAIDs, such as ibuprofen or naproxen    Blockage or narrowing of the blood vessels of the heart    Blood dyscrasia, such anemia, leukemia, lymphoma, or myeloma    Bone marrow depression    Catecholamine-releasing paraganglioma    Blood clotting disorder    Congenital long QT syndrome    Difficulty urinating or completely emptying your bladder    Uncorrected electrolyte abnormalities    Fungal infection    Gastrointestinal (GI) bleeding    Gastrointestinal (GI) obstruction    G6PD deficiency    Recent heart attack    High blood pressure    Irregular heartbeat or heart rhythm    Kidney disease or hemodialysis    Mononucleosis (mono)    Myasthenia gravis    Parkinson's disease    Pheochromocytoma    Reye syndrome    Seizure disorder    Ulcerative colitis    None of the above   Do you have any of these conditions that can affect the immune system? Scroll to see all options. Select all that apply.    None of these   Not selected:    History of bone marrow transplant    Chronic kidney disease    Chronic liver disease (including cirrhosis)    HIV/AIDS    Inflammatory bowel disease (Crohn's disease or ulcerative colitis)    Lupus    Moderate to severe plaque psoriasis    Multiple sclerosis    Rheumatoid arthritis    Sickle cell anemia    Alpha or beta thalassemia    History of solid organ transplant (kidney, liver, or heart)    History of spleen removal    An autoimmune disorder not listed here    A condition requiring treatment with long-term use of oral steroids (such as prednisone, prednisolone, or dexamethasone)   Have you ever been diagnosed with  cancer? Select one.    No   Not selected:    Yes, I have cancer now    Yes, but I'm in remission   Have you ever had either of these conditions? Select all that apply.    No   Not selected:    Metoclopramide-associated dystonic reaction    Tardive dyskinesia   Do any of these apply to the people who live with you? Select all that apply.    A child under the age of 5   Not selected:    An adult 65 or older    A person who is pregnant    A person who has given birth, had a miscarriage, had a pregnancy loss, or had an  in the last 2 weeks    An  or Native [place]    None of the above   Does any member of your household have any of these medical conditions? Select all that apply.    None of the above   Not selected:    Asthma    Disorders of the brain, spinal cord, or nerves and muscles, such as dementia, cerebral palsy, epilepsy, muscular dystrophy, or developmental delay    Chronic lung disease, such as COPD or cystic fibrosis    Heart disease, such as congenital heart disease, congestive heart failure, or coronary artery disease    Cerebrovascular disease, such as stroke or another condition affecting the blood vessels or blood supply to the brain    Blood disorders, such as sickle cell disease    Diabetes    Metabolic disorders such as inherited metabolic disorders or mitochondrial disease    Kidney disorders    Liver disorders    Weakened immune system due to illness or medications such as chemotherapy or steroids    Children under the age of 19 who are on long-term aspirin therapy    Extreme obesity (BMI > 40)   Are you pregnant? Select one.    No   Not selected:    Yes   When was your last menstrual period? If you don't currently have periods or no longer have periods, please briefly explain.    2022. I switched to a 24 day birth control and haven't had a period since.   Within the last 2 weeks, have you: - Given birth - Had a miscarriage - Had a pregnancy loss - Had an   Being postpartum (live birth or loss) within the last 2 weeks increases your risk of flu complications. Select one.    No   Not selected:    Yes   Are you breastfeeding? Select one.    No   Not selected:    Yes   Just a few more questions about medications, and then you're finished. Have you used any non-prescription medications or nasal sprays for your current symptoms? Examples include saline sprays, decongestants, NyQuil, and Tylenol. Select one.    Yes   Not selected:    No   Which of these non-prescription medications have you tried? Scroll to see all options. Select all that apply.    Fluticasone (Flonase)    Ibuprofen (Advil, Motrin, Midol)    Phenylephrine (Sudafed)   Not selected:    Acetaminophen (Tylenol)    Budesonide (Rhinocort)    Cetirizine (Zyrtec)    Chlorpheniramine (Aller-chlor, Chlor-Trimeton)    Cromolyn (NasalCrom)    Dextromethorphan (Delsym, Robitussin, Vicks DayQuil Cough)    Diphenhydramine (Benadryl)    Fexofenadine (Allegra)    Guaifenesin (Mucinex)    Guaifenesin/dextromethorphan (Delsym DM, Mucinex DM, Robitussin DM)    Ketotifen (Alaway, Zaditor)    Loratadine (Alavert, Claritin)    Naphazoline-pheniramine (Naphcon-A, Opcon-A, Visine-A)    Omeprazole (Prilosec)    Oxymetazoline (Afrin)    Triamcinolone (Nasacort)    None of the above   In the past month, have you taken antibiotics for similar symptoms? Examples of antibiotics include amoxicillin, amoxicillin-clavulanate (Augmentin), penicillin, cefdinir (Omnicef), doxycycline, and clindamycin (Cleocin). Select one.    No   Not selected:    Yes    I'm not sure   Have you taken any monoamine oxidase inhibitor (MAOI) medications in the last 14 days? Examples include rasagiline (Azilect), selegiline (Eldepryl, Zelapar), isocarboxazid (Marplan), phenelzine (Nardil), and tranylcypromine (Parnate). Select one.    No   Not selected:    Yes    I'm not sure   Do you take Kynmobi or Apokyn (apomorphine)? Select one.    No   Not selected:    Yes    " I'm not sure   Are you taking any other medications or supplements? On the next screen, you need to list all vitamins, supplements, non-prescription medications (such as aspirin or Aleve), and prescription medications that you're taking. Select one.    Yes   Not selected:    Yes, but I'm not sure what they are    No   Have you ever had an allergic or bad reaction to any medication? Select one.    Yes   Not selected:    No   Have you had an allergic or bad reaction to any of these medications? Select all that apply.    None of the above   Not selected:    Baloxavir (Xofluza)    Benzonatate (Tessalon Perles)    Fluconazole, itraconazole, or terconazole (brands include Diflucan, Sporanox, Terazol)    Oseltamivir (Tamiflu) or zanamivir (Relenza)    I'm not sure   Have you had an allergic or bad reaction to any of these antibiotic medications? Select all that apply.    Penicillin or any \"-cillin\" antibiotic, such as amoxicillin, ampicillin, dicloxacillin, nafcillin, or piperacillin (Brands include Augmentin, Unasyn, and Zosyn)   Not selected:    Tetracycline or any \"-cycline\" antibiotic, such as doxycycline, demeclocycline, minocycline (Brands include Declomycin, Doryx, Dynacin, Oracea, Monodox, Panmycin, and Vibramycin)    Ciprofloxacin or any \"-floxacin\" antibiotic, such as gemifloxacin, levofloxacin, moxifloxacin, or ofloxacin (Brands include Factive, Cipro, Floxin, and Levaquin)    Cephalexin or any \"cef-\" antibiotic, such as cefazolin, cefdinir, cefuroxime, ceftriaxone, ceftazidime, or cefepime (Brands include Ancef, Ceftin, Fortaz, Keflex, Maxipime, Rocephin, and Simplicef)    Azithromycin or any \"-thromycin\" antibiotic, such as erythromycin or clarithromycin (Brands include Biaxin, Erythrocin, Z-marisa, and Zithromax)    Clindamycin or lincomycin (Brands include Cleocin and Lincocin)    None of the above    I'm not sure   Have you had an allergic or bad reaction to any of these medications? Select all that apply.   "  None of the above   Not selected:    Albuterol or a similar medication    Corticosteroid (steroid) medication, including topical steroids, inhaled steroids, nasal steroids, or oral steroids (budesonide, ciclesonide, dexamethasone, flunisolide, fluticasone, methylprednisolone, triamcinolone, prednisone (or brand names Alvesco, Deltasone, Flovent, Medrol, Nasacort, Rhinocort, or Veramyst)    Metoclopramide (Reglan)    Ondansetron (Zuplenz, Zofran ODT, Zofran)    Prochlorperazine (Compazine)    I'm not sure   Have you had an allergic or bad reaction to any of these eye drops or nasal sprays? Scroll to see all options. Select all that apply.    None of the above   Not selected:    Azelastine (Astelin, Astepro, Optivar)    Cromolyn (Crolom, NasalCrom)    Ipratropium (Atrovent)    Ketotifen (Alaway, Zaditor)    Pheniramine/naphazoline (Naphcon-A, Opcon-A, Visine-A)    Olopatadine (Pataday, Patanol, Pazeo)    I'm not sure   Have you had an allergic or bad reaction to any of these non-prescription medications? Scroll to see all options. Select all that apply.    None of the above   Not selected:    Acetaminophen (Tylenol)    Aspirin    Cetirizine (Zyrtec)    Chlorpheniramine (Aller-chlor, Chlor-Trimeton)    Dextromethorphan (Delsym, Robitussin, Vicks DayQuil Cough)    Diphenhydramine (Benadryl)    Fexofenadine (Allegra)    Guaifenesin (Mucinex)    Guaifenesin/dextromethorphan (Delsym DM, Mucinex DM, Robitussin DM)    Ibuprofen (Advil, Motrin, Midol)    Loratadine (Alavert, Claritin)    Oxymetazoline (Afrin)    Phenylephrine (Sudafed)    I'm not sure   Are you allergic to milk or to the proteins found in milk (for example, whey or casein)? A milk allergy is different from lactose intolerance. Select one.    No   Not selected:    Yes    I'm not sure   Have you ever had jaundice or liver problems as a result of taking azithromycin (Zithromax, Zmax)? Jaundice is a condition in which the skin and the whites of the eyes turn  yellow. Select all that apply.    No, not that I know of   Not selected:    Yes, jaundice    Yes, liver problems   Do you need a doctor's note? A doctor's note confirms that you received care today and states when you can return to school or work. It does not contain information about your diagnosis or treatment plan. Your provider will make the final decision on whether to give you a doctor's note and for how long. Doctor's notes CANNOT be backdated. We can't provide medical leave paperwork through this type of visit. If more paperwork is needed to request time off, contact your primary care provider. Select one.    No   Not selected:    Today only (1 day)    Today and tomorrow (2 days)    3 days    7 days    10 days    14 days   Is there anything else you'd like to tell us about your symptoms?    Sinus pain is only on the right side   ----------   Medical history   Medical history data does not currently exist for this patient.

## 2022-03-25 NOTE — EXTERNAL PATIENT INSTRUCTIONS
Diagnosis   Bacterial sinusitis   My name is Allison Ash, and I'm a healthcare provider at [facility]. I'm sorry you're not feeling well. I reviewed your interview, and I see that you have bacterial sinusitis.   To prevent the spread of illness to others, I recommend that you stay home and away from other people as much as possible while you're sick.   Medications   Your pharmacy   [facility] [address] [place] [phone]     Prescription      Mucus Relief ER oral tablet, extended release (600mg): Take 1 tablet by mouth every 12 hours as needed for cough and congestion.      Doxycycline monohydrate (100mg): Take 1 tablet by mouth twice a day for 10 days for infection. This medication is an antibiotic. Take it exactly as directed. You must finish the entire course of medication, even if you feel better after the first few days of treatment.    Start taking the antibiotics I've prescribed right away. You need to finish the entire course of antibiotics, even if you start to feel better before the pills run out.   Some women develop yeast infections after taking antibiotics. If you develop a yeast infection, you can treat it with antifungal creams or suppositories. These are available without a prescription at [employer] and many supermarkets.   About your diagnosis   The sinuses are hollow spaces connected to the nasal passages. Sinusitis occurs when the sinuses swell and block the drainage of fluid and mucus from the nose, causing pain, pressure, and congestion. Fatigue, difficulty sleeping, or decreased appetite may accompany your symptoms.   More than 90% of sinus infections are caused by viruses. However, in certain cases, a sinus infection may be caused by bacteria. Bacterial sinus infections usually look like one of the following cases:    Severe sinus symptoms with a fever over 102F.    Sinus symptoms that have not improved at all after 10 days.    Cold symptoms that slowly improve but  then worsen again after 5 or 6 days, usually with a high fever, headache, or nasal discharge.   What to expect   If you follow this treatment plan, you should start to feel better within a few days.   You can return to your normal activities when ALL of the following are true:    You've been fever-free for more than 24 hours without using fever-reducing medications such as Tylenol    Your other symptoms have improved    It's been at least 5 full days since your symptoms first started   When to seek care   Call us at [phone]   with any sudden or unexpected symptoms.    Symptoms that last longer than 10 to 14 days.    Symptoms that get better for a few days, and then suddenly get worse.    Fever that measures over 103F or continues for more than 3 days.    Any vision changes.    Your headache worsens.    Stiff neck.    Swelling of your forehead or eyes.    Coughing up red or bloody mucus.    Swallowing becomes extremely difficult or impossible.    More than 5 episodes of diarrhea in a day.    More than 5 episodes of vomiting in a day.    Severe shortness of breath.    Severe chest pain   Other treatment    Rest! Your body needs rest to recover and fight infection.    Drink plenty of water to stay hydrated.    Use steam to soothe your sinuses: Breathe it in from a shower or a bowl of hot water. Placing a warm, moist washcloth over your nose and forehead may help relieve the sinus pain and pressure.    Try non-prescription saline nasal sprays to help your nasal symptoms. Try using a Neti Pot to flush out your stuffy nose and sinuses. Neti Pots are available at any drugstore without a prescription.    Avoid smoke and air pollution. Smoke can make infections worse.   Prevention    Avoid close contact with other people when you're sick.    Cover your mouth and nose when you cough or sneeze. Use a tissue or cough into your elbow. Make sure that used tissues go directly into the trash.    Avoid touching your eyes,  nose, or mouth while you're sick.    Wash your hands often, especially after coughing, sneezing, or blowing your nose. If soap and water are not available, use an alcohol-based hand .    If you or someone in your home or workplace is sick, disinfect commonly used items. This includes door handles, tables, computers, remotes, and pens.    Coronavirus (COVID-19) information   Common symptoms of COVID-19 include fever, cough, shortness of breath, fatigue, muscle or body aches, headaches, new loss of sense of taste or smell, sore throat, stuffy or runny nose, nausea or vomiting, and diarrhea. Most people who get COVID-19 have mild symptoms and can rest at home until they get better. Elderly people and those with chronic medical problems may be at risk for more serious complications.   FAQs about the COVID-19 vaccine   There are three authorized COVID-19 vaccines: [employer] Harriett Vaccine (J&J/FoxyTunes), Moderna, and Pfizer-BioNTech (Pfizer). The J&J/Harriett and Moderna vaccines are approved for use in people aged 18 and older. The Pfizer vaccine is approved for those aged 5 and older. All three are available at no cost. Even if you don't have health insurance, you can still get the COVID-19 vaccine for free.   Which vaccine is the best? Which vaccine should I get?   All three vaccines are highly effective. Even if you get COVID-19 after being vaccinated, all of the vaccines help prevent severe disease, hospitalization, and complications.   Most people should get whichever vaccine is first available to them. However, women younger than 50 years old should consider the rare risk of blood clots with low platelets after vaccination with the J&J/Harriett vaccine. This risk hasn't been seen with the other two vaccines.   Are the vaccines safe?   Yes. Hundreds of millions of people in the [place] have already safely received COVID-19 vaccines. As part of Phase 3 clinical trials in the [place] and other countries,  researchers collected safety and efficacy data for all three vaccines. These clinical trials follow strict standards. Before a vaccine is approved, the manufacturing company must submit data to the Food and Drug Administration (FDA) for review. Tens of thousands of volunteers participated in the clinical trials for the vaccines. The FDA continues to monitor safety data as the vaccines are given to the general population.   Do I need the vaccine if I've already had COVID?   Yes. Vaccination helps protect you even if you've already had COVID.   If you had COVID-19 and had symptoms, wait to get vaccinated until ALL of the following are true:    5 full days since your symptoms started    24 hours with no fever, without the use of fever-reducing medications    Your other COVID-19 symptoms are improving   If you tested positive for COVID-19 but did not have symptoms, you can get vaccinated after 5 full days have passed since you had a positive test, as long as you don't develop symptoms.   If you had COVID-19 and were treated with monoclonal antibodies, you should wait 90 days before getting a COVID-19 vaccination.   How many doses of the vaccine do I need?   It depends on your age, which vaccine you get, and any underlying medical conditions you may have.   To be fully vaccinated, you need to complete the primary series of COVID-19 vaccines. This means:   One dose of the J&J/Harriett vaccine   or   Two doses of the Moderna vaccine, spaced 4 weeks apart   or   Two doses of the Pfizer vaccine, spaced 3 weeks apart   If you're immunocompromised, the primary series may include a third dose of the Moderna or Pfizer vaccine, given 28 days after the second dose.   To be up to date on vaccines, you need to get all of the recommended COVID-19 vaccines, including boosters. People who are up to date have the best protection against a COVID-19 infection.   I'm immunocompromised. Do I qualify for a third dose?   If any of these  situations apply, you have a moderately to severely weakened immune system:    You're getting active treatment for a cancer or tumor of the blood    You've had an organ transplant and are taking medicine to suppress your immune system    You've had a stem cell transplant within the last 2 years    You're taking medicine to suppress your immune system, such as high-dose corticosteroids    You have moderate to severe primary immunodeficiency (such as DiGeorge syndrome, Wiskott-Meriden syndrome)    You have advanced or untreated HIV infection   If you got the Pfizer vaccine and are 5 years old or older, AND it's been at least 28 days since your second shot, you should get an additional primary shot of the Pfizer vaccine.   If you got the Moderna vaccine and are 18 years old or older, AND it's been at least 28 days since your second shot, you should get an additional primary shot of the Moderna vaccine.   If you got the J&J/Harriett vaccine, no additional primary shot is recommended at this time.   If you think you need an additional primary shot, speak with your care team.   I'm fully vaccinated. Do I need a booster shot?   Everyone 12 and older should get a booster shot. Immunity from vaccinations can decrease over time, and a booster renews the effect of the vaccination. If you're 12 to 17 years old, you can get the Pfizer booster. If you're 18 or older, you can get the Pfizer or Moderna booster.   If you got the J&J/Harriett vaccine AND it's been at least 2 months since your shot, you should get a booster shot now.   If you got the Pfizer vaccine AND it's been at least 5 months since your second dose, you should get a booster shot now.   If you got the Moderna vaccine AND it's been at least 5 months since your second dose, you should get a booster shot now.   If you'd like more information on where and how to get a booster shot, speak with your care team.   What are the common side effects of the vaccine?   A sore  arm, tiredness, headache, and muscle pain may occur within two days of getting the vaccine and last a day or two. For the Moderna or Pfizer vaccines, side effects are more common after the second dose. People over the age of 55 are less likely to have side effects than younger people.   After I'm up to date on vaccines, can I still get or spread COVID?   Yes, but your disease should be milder. And your risk of serious illness, hospitalization, and complications will be much lower, especially if you're up to date. Being vaccinated reduces the risk of spreading the disease if you get it.   After I'm up to date on vaccines, can I go back to normal?   You should still wear a mask indoors in public if:    It's required by laws, rules, regulations, or local guidance.    You have a weakened immune system.    Your age puts you at increased risk of severe disease.    You have a medical condition that puts you at increased risk of severe disease.    Someone in your household has a weakened immune system, is at increased risk for severe disease, or is unvaccinated.    You're in an area of high transmission.   For travel information, see the CDC's latest guidance at https://www.cdc.gov  .   Everyone who tests positive for COVID, regardless of vaccination or booster status, should:    Stay home for at least 5 days. Day 1 is the first full day after your symptoms started. If you never develop symptoms, day 1 is the first full day after the sample was taken for the test.    If you have symptoms, continue to stay home until you're fever free for 24 hours without the use of fever-reducing medicines and your other symptoms have improved.    If you never develop symptoms, you may leave your house after day 5.    You should wear a well-fitting mask around others at home and in public until day 10, even if you don't have symptoms or your symptoms are improving.    Don't travel until after at least day 10.    Don't go to places where you  can't wear a mask, such as restaurants, bars, or gyms.   If you're exposed to COVID, follow the advice below based on your vaccination status.   If any of these apply:    You're 18 or older and have had all recommended vaccine doses, including boosters and additional primary shots for certain immunocompromised people    You're 5 to 17 years old and have had 2 doses of the Pfizer vaccine    You've had COVID-19 within the last 90 days, confirmed by a nose or throat swab test   Then:    Wear a well-fitting mask around others for 10 days. The date of last close contact is considered day 0.    Get tested at least 5 days after you last had close contact with someone with COVID-19.   If any of these apply:    You're 18 or older, have had two doses of the Pfizer or Moderna vaccine, but have NOT gotten a recommended booster shot    You got a dose of the J&J/Harriett vaccine more than 2 months ago, but you have NOT gotten a recommended booster shot    You're not vaccinated   Then:    Stay home for at least 5 days after your last close contact with a person who has COVID-19. The date of last close contact is considered day 0.    Wear a well-fitting mask for 10 days when around others at home or in public.    Watch for fever, cough, shortness of breath, or other COVID-19 symptoms for 10 days after your last close contact with someone with COVID-19.    If you develop symptoms, get tested right away.    If you don't develop symptoms, get tested at least 5 days after your last close contact with someone with COVID-19.   For more information about exposure, isolation, and quarantine, see https://www.cdc.gov/coronavirus/2019-ncov/your-health/quarantine-isolation.html  .   General information about COVID-19   What should I do if I'm exposed to someone with COVID-19?   In general, you need to be in close contact with someone who has COVID-19 to get infected. Close contact means:    Living in the same household as someone with  COVID-19.    Caring for someone with COVID-19.    Being within 6 feet of someone with COVID-19 for a total of at least 15 minutes over a 24-hour period.    Being in direct contact with respiratory droplets from someone with COVID-19 (for example, being coughed on, kissing, or sharing utensils).   Everyone who tests positive for COVID, regardless of vaccination or booster status, should:    Stay home for at least 5 days. Day 1 is the first full day after your symptoms started. If you never develop symptoms, day 1 is the first full day after the sample was taken for the test.    If you have symptoms, continue to stay home until you're fever free for 24 hours without the use of fever-reducing medicines and your other symptoms have improved.    If you never develop symptoms, you may leave your house after day 5.    You should wear a well-fitting mask around others at home and in public until day 10, even if you don't have symptoms or if your symptoms are improving.    Don't travel until after at least day 10.    Don't go to places where you can't wear a mask, such as restaurants, bars, or gyms.   If you're exposed to COVID, follow the advice below based on your vaccination status.   If any of these apply:    You're 18 or older and have had all recommended vaccine doses, including boosters and additional primary shots for certain immunocompromised people    You're 5 to 17 years old and have had 2 doses of the Pfizer vaccine    You've had COVID-19 within the last 90 days, confirmed by a nose or throat swab test   Then:    Wear a well-fitting mask around others for 10 days. The date of last close contact is considered day 0.    Get tested at least 5 days after you last had close contact with someone with COVID-19.   If any of these apply:    You're 18 or older, have had two doses of the Pfizer or Moderna vaccine, but have NOT gotten a recommended booster shot    You got a dose of the J&J/Harriett vaccine more than 2 months  ago, but you have NOT gotten a recommended booster shot    You're not vaccinated   Then:    Stay home for at least 5 days after your last close contact with a person who has COVID-19. The date of last close contact is considered day 0.    Wear a well-fitting mask for 10 days when around others at home or in public.    Watch for fever, cough, shortness of breath, or other COVID-19 symptoms for 10 days after your last close contact with someone with COVID-19.    If you develop symptoms, get tested right away.    If you don't develop symptoms, get tested at least 5 days after your last close contact with someone with COVID-19.   What if I develop symptoms of COVID-19?   Get tested right away if you develop symptoms.   Everyone who tests positive, regardless of vaccination or booster status, should:    Stay home for at least 5 days. Day 1 is the first full day after your symptoms started. If you never develop symptoms, day 1 is the first full day after the sample was taken for the test.    If you have symptoms, continue to stay home until you're fever free for 24 hours without the use of fever-reducing medicines and your other symptoms have improved.    If you never develop symptoms, you may leave your house after day 5.    You should wear a well-fitting mask around others at home and in public until day 10, even if you don't have symptoms or if your symptoms are improving.    Don't travel until after at least day 10.    Don't go to places where you can't wear a mask, such as restaurants, bars, or gyms.   CALL your healthcare provider or clinic right away to discuss next steps if you have any of these risk factors:    Age 65 or older    Pregnant    Chronic medical condition such as diabetes, liver disease, kidney disease requiring dialysis, heart disease, high blood pressure, severe obesity, or lung disease (including moderate to severe asthma)    A medical condition that affects your immune system    Taking a  medication that affects your immune system   Otherwise, if your symptoms are mild, you don't need to call your healthcare provider or be seen for an exam. You can recover at home. Over-the-counter cold medications can help ease symptoms.   To prevent the spread of COVID-19 to the people and animals in your household:    Stay in a specific room away from other people in your home, and use a separate bathroom if possible    Wear a mask when close contact with household members can't be avoided   When to get care   Call your healthcare provider immediately if you have any of the following:    Fever over 103F    Fever that doesn't come down after taking medications such as Tylenol or ibuprofen    Fever that returns after being gone for more than 24 hours    Fever lasting more than 4 days    Worsening shortness of breath or difficulty breathing   Go to your nearest ER or call 911 if you have any of the following:    Shortness of breath that makes it hard to do simple things like get dressed, bathe, or comb your hair    Persistent chest pain or chest tightness    New confusion or difficulty staying alert    Bluish color to the lips or face    Flu vaccine information   Getting a flu vaccine this year is more important than ever. The vaccine not only protects you and the people around you from the flu, it also helps reduce the strain on healthcare systems responding to the COVID-19 pandemic.   Who should get a flu vaccine?   Everyone 6 months of age and older should get a yearly flu vaccine.   When should I get vaccinated?   You should get a flu vaccine by the end of October. Once you're vaccinated, it takes about two weeks for antibodies to develop and protect you against the flu. That's why it's important to get vaccinated as soon as possible.   After October, is it too late to get vaccinated?   No. You should still get vaccinated. As long as the flu viruses are still in your community, flu vaccines will remain available,  even into January of next year or later.   Why do I need a flu vaccine EVERY year?   Flu viruses are constantly changing, so flu vaccines are usually updated from one season to the next. Your protection from the flu vaccine also lessens over time.   Is the flu vaccine safe?   Yes. Over the last 50 years, hundreds of millions of Americans have safely received the flu vaccines.   What are the side effects of flu vaccines?   You CANNOT get the flu from a flu vaccine. Common side effects of the flu shot include soreness, redness and/or swelling where the shot was given, low grade fever, and aches. Common side effects of the nasal spray flu vaccine for adults include runny nose, headaches, sore throat, and cough. For children, side effects include wheezing, vomiting, muscle aches, and fever.   Does the flu vaccine increase your risk of getting COVID-19?   No. There is no evidence that getting a flu vaccine increases your risk of getting COVID-19.   Is it safe to get the flu vaccine along with a COVID-19 vaccine?   Yes. It's safe to get the flu vaccine with a COVID-19 vaccine or booster.   Contact your healthcare provider TODAY for details on when and where to get your flu vaccine.   Your provider   Your diagnosis was provided by Allison Ash, a member of your trusted care team at [facility].   If you have any questions, call us at [phone]  .

## 2022-05-01 ENCOUNTER — E-VISIT (OUTPATIENT)
Dept: FAMILY MEDICINE CLINIC | Facility: TELEHEALTH | Age: 32
End: 2022-05-01

## 2022-05-01 PROCEDURE — BRIGHTMDVISIT: Performed by: NURSE PRACTITIONER

## 2022-05-01 NOTE — E-VISIT TREATED
Chief Complaint: Coronavirus (COVID-19), cold, sinus pain, allergy, or flu   Patient introduction   Patient is 31-year-old female who reports cough, sore throat, and voice hoarseness that started 6-9 days ago. Regarding date of symptom onset, patient writes: 04/25/22.   When asked why they're seeking care online today, patient reports they want to know if they have a cold or something more serious and just want to feel better.   Patient has not requested COVID testing.   COVID-19 exposure, testing history, and vaccination status:    No known exposure to a confirmed or suspected case of COVID-19.    Recent travel outside of their local community. Regarding dates and locations of recent travel, patient writes: [place], [place]. 4/22-4/26    Patient had a viral lab test > 3 months ago. Test result was negative.    Reports receiving 2 doses of the COVID-19 vaccine (Moderna, Moderna). Received their most recent dose of the vaccine > 14 days ago.   Risk factors for severe disease from COVID-19 infection:    BMI >= 25    A mood disorder   Patient-submitted comments: Throat pain started on the left side and has now shifted to the right. Pain is also in my right ear..   Patient did not request an excuse note.   General presentation   Patient reports improvement of symptoms. Symptoms came on gradually.   Fever:    Denies fever.   Sinus and nasal symptoms:    Denies nasal or sinus congestion.    Denies rhinitis.    Denies itchy nose or sneezing.   Sore throat:    Reports sore throat.    Denies recent strep exposure.    Reports pain when swallowing but affirms ability to swallow liquids and solid foods.    Reports severe hoarseness. Patient doesn't believe hoarseness is due to voice strain.   Head and body aches:    Denies headache.    Denies sweats.    Denies chills.    Denies myalgia.    Denies fatigue.   Cough:    Reports cough.    Cough is worse in the morning.    Cough is mildly productive of sputum.    Describes color of  mucus as yellow.   Wheezing and SOB:    Denies COPD diagnosis.    Denies asthma diagnosis.    Denies wheezing.    Denies shortness of breath.    Denies previous albuterol inhaler use during URIs, bronchitis, or pneumonia.    Denies previous steroid inhaler use during URIs, bronchitis, or pneumonia.   Chest pain:    Denies chest pain.   Ear pressure/pain:    Reports pain and pressure.   Dizziness:    Denies dizziness.   Allergies:    Reports history of allergies.    Patient has known seasonal allergies.   Flu exposure:    Denies recent exposure to confirmed flu diagnosis.    Denies receiving a flu vaccine this season.   Patient denies the following red flags:    Changes in alertness or awareness    Symptoms suggesting airway obstruction    Decreased urination   Risk factors for antibiotic resistance:    Antibiotic use for similar symptoms within the last 30 days   Pregnancy/menstrual status/breastfeeding:    Denies being pregnant    Denies breastfeeding    Regarding last menstrual period, patient writes: Currently don't have periods due to birth control   Self-exam:    No difficulty moving their chin toward their chest    Tonsillar edema    Tonsils appear normal    Swollen and tender neck lymph nodes    Reports antibiotic treatment for similar symptoms within the past month   Current medications   Reports taking over-the-counter medication for current symptoms. Patient has taken cetirizine, fluticasone, and ibuprofen.   Reports taking Sertraline 50 MG [Zoloft].   Medication allergies    Penicillins   Medication contraindication review   Reports history positive for depression. Therefore, the following medication(s) will not be prescribed:    Metoclopramide   Denies history of metoclopramide-associated dystonic reaction and tardive dyskinesia.   No known history of azithromycin-associated cholestatic jaundice or hepatic impairment.   Past medical history   Immune conditions: Denies immunocompromising conditions.  Denies history of cancer.   Social history   High-risk household contacts: Patient's household includes one or more members of a group with risk factors for influenza complications, including a child < 5 years.   Non-smoker.   Assessment   Strep pharyngitis. Ruled out: Traumatic laryngitis.   This is the likely diagnosis based on patient's interview responses, including:    Sore throat    Swollen glands/lymph nodes   HHS required information for COVID-19 lab data reporting (if test is ordered):   Symptoms:    Cough    Sore throat   Symptom onset: 6-9 days ago ago  Pregnancy: No or N/A  Healthcare worker? No  Resident in a congregate care setting? No  Previous history of COVID-19 testing: Patient had a viral lab test > 3 months ago. Test result was negative.     Plan   Medications:    azithromycin 250 mg tablet RX 250mg 1 tab PO qd 5d for infection. On day 1 only, take 2 tablets (500mg) at the same time. Then, on days 2-5, take 1 tablet (250mg) once a day. This medication is an antibiotic. Take it exactly as directed. You must finish the entire course of medication, even if you feel better after the first few days of treatment. Amount is 6 tab.   The patient's prescription will be sent to:   [facility]   [address] [place] [id number]   Phone: [phone]     Fax: [phone]   Education:    Condition and causes    Prevention    Treatment and self-care    When to call provider   ----------   Electronically signed by YULIANA Cordero on 2022-05-01 at 19:12PM   ----------   Patient Interview Transcript:   Why are you getting care through eVisit today? We can't guarantee a specific treatment or test. Your provider will decide what's best for you. Select all that apply.    I want to know if I have a cold or something more serious    I just want to feel better!   Not selected:    I want to know if I need to be seen by a provider    I need a doctor's note    I want to be tested for COVID-19    I want to get  the COVID-19 vaccine    I think I'm having side effects from the COVID-19 vaccine    None of the above   COVID-19 symptoms are similar to symptoms of other illnesses. This makes it difficult to diagnose. Please carefully consider each question and answer as best you can. This will help your provider make the right diagnosis. Which of these symptoms are bothering you? Select all that apply.    Cough    Sore throat    Hoarse voice or loss of voice   Not selected:    Shortness of breath    Fever    Stuffed-up nose or sinuses    Runny nose    Itchy or watery eyes    Itchy nose or sneezing    Loss of smell or taste    Headache    Sweats    Chills    Muscle or body aches    Fatigue or tiredness    Nausea or vomiting    Diarrhea    I don't have any of these symptoms   Do you have any of these conditions? These conditions can put you at increased risk for severe disease if you're infected with COVID-19. Select all that apply.    Mood disorder, including depression or schizophrenia spectrum disorders   Not selected:    Chronic lung disease, such as cystic fibrosis or interstitial fibrosis    Heart disease, such as congenital heart disease, congestive heart failure, or coronary artery disease    Disorder of the brain, spinal cord, or nerves and muscles, such as dementia, cerebral palsy, epilepsy, muscular dystrophy, or developmental delay    Metabolic disorder or mitochondrial disease    Cerebrovascular disease, such as stroke or another condition affecting the blood vessels or blood supply to the brain    Down syndrome    Substance use disorder, such as alcohol, opioid, or cocaine use disorder    Tuberculosis    None of the above   Do you live in a group care setting? Examples include: - Nursing home - Residential care - Psychiatric treatment facility - Group home - [facility] - Board and care home - Homeless shelter - Foster care setting Select one.    No   Not selected:    Yes   Have you ever been tested for COVID-19?  Select one.    Yes   Not selected:    No   When was your most recent COVID-19 test? Select one.    More than 3 months ago   Not selected:    Within the last week (specify date in MM/DD/YY format)    7 to 14 days ago    15 to 30 days ago    1 to 3 months ago   What type of COVID-19 test did you most recently have? There are two types of COVID-19 tests: - Viral tests check if you're currently infected with COVID-19. For these tests, a nose swab or saliva sample is taken. Viral tests include self-tests and tests done at a doctor's office, lab, or testing site. - Antibody tests check if you've been infected in the past. For these tests, your blood is drawn. Antibody tests can only be done at a doctor's office, lab, or testing site. Select one.    Viral test at a doctor's office, lab, or testing site   Not selected:    Viral self-test    Antibody test   What was the result of your most recent COVID-19 test? Select one.    Negative   Not selected:    Positive    I'm not sure   Have you gotten the COVID-19 vaccine? Select one.    Yes   Not selected:    No   How many doses of the COVID-19 vaccine have you gotten? This includes boosters as well as third or fourth doses for those who are immunocompromised. Select one.    2 doses   Not selected:    1 dose    3 doses    4 doses   Which COVID-19 vaccine did you get for your first dose? Check your Vaccination Record Card under Product Name/. Select one.    Moderna   Not selected:    Gagan & Gagan's Harriett Vaccine (J&J/Harriett)    Pfizer-BioNTech (Pfizer)   Which COVID-19 vaccine did you get for your second dose? Check your Vaccination Record Card under Product Name/. Select one.    Moderna   Not selected:    Gagan & Gagan's Harriett Vaccine (J&J/Harriett)    Pfizer-BioNTech (Pfizer)   When did you get your most recent dose of the COVID-19 vaccine?    More than 14 days ago   Not selected:    Less than 48 hours (2 days) ago    48 to 72 hours (3 days)  "ago    3 to 5 days ago    5 to 7 days ago    7 to 14 days ago   In the last 14 days, have you traveled outside of your local community? This includes travel by car, RV, bus, train, or plane. Travel increases your chances of getting and spreading COVID-19. Select one.    Yes   Not selected:    No   Please tell us when and where you traveled. Include the date you left, the places you traveled to, and the date you returned home.    [facility] [place]. 4/22-4/26   In the last 14 days, have you had close contact with someone who has coronavirus (COVID-19)? \"Close contact\" means any of these: - Living in the same household as someone with COVID-19. - Caring for someone with COVID-19. - Being within 6 feet of someone with COVID-19 for a total of at least 15 minutes over a 24-hour period. For example, three 5-minute exposures for a total of 15 minutes. - Being in direct contact with respiratory droplets from someone with COVID-19 (being coughed on, kissing, sharing utensils). Select one.    No, not that I know of   Not selected:    Yes, a confirmed case    Yes, a suspected case   When did your current symptoms start? Select one.    6 to 9 days ago   Not selected:    Less than 48 hours ago    3 to 5 days ago    10 to 14 days ago    2 to 4 weeks ago    More than a month ago   Do you know the exact date your symptoms started? If so, enter the date in MM/DD/YY format. Select one.    Yes (specify): 04/25/22   Not selected:    No   Did your symptoms come on suddenly or gradually? Select one.    Gradually   Not selected:    Suddenly    I'm not sure   Have your symptoms improved at all since they began? Select one.    Yes, but they haven't gone away completely   Not selected:    Yes, but then they came back worse than before    No    I'm not sure   Do you cough so hard that it's made you gag or vomit? By gag, we mean has your coughing made you choke or dry heave? Select all that apply.    No   Not selected:    Yes, my coughing has made me " gag    Yes, my coughing has made me vomit   When is your cough the worst? Select all that apply.    In the morning, or when I wake up   Not selected:    During the day    At nighttime, or while I'm sleeping    I'm not sure   Are you coughing up mucus or phlegm? Select one.    Yes, a little   Not selected:    No, my cough is dry    Yes, a lot   What color is most of the mucus or phlegm that you're coughing up? Select one.    Yellow   Not selected:    Clear    White/frothy    Green    Red or pink    I'm not sure   Can you swallow liquids and solid foods? A sore throat may be painful when swallowing, but it shouldn't prevent you from swallowing. Select one.    Yes, but it's painful   Not selected:    Yes, with ease    Yes, but it's uncomfortable    It's hard to swallow anything because it feels like liquids and food get stuck in my throat    No, I can't swallow anything, liquid or solid foods   Since your symptoms started, have you felt dizzy? Select one.    No   Not selected:    Yes, but I can continue with my regular daily activities    Yes, and it makes it hard to stand, walk, or do daily activities   Do you have chest pain? You might also feel it as discomfort, aching, tightness, or squeezing in the chest. Select one.    No   Not selected:    Yes   Have you urinated at least 3 times in the last 24 hours? Select one.    Yes   Not selected:    No    I'm not sure   Changes in alertness or awareness may mean you need emergency care. Since your symptoms started, have you had any of these? Select all that apply.    None of the above   Not selected:    Confusion    Slurred speech    Not knowing where you are or what day it is    Difficulty staying conscious    Fainting or passing out   Do your symptoms include a whistling sound, or wheezing, when you breathe? Select one.    No   Not selected:    Yes    I'm not sure   Early in this interview, you told us you were hoarse or you'd lost your voice. How would you describe the  changes to your voice? Select one.    I can barely talk at all   Not selected:    It just sounds a little raspy    It's harder than usual to talk   Is it possible that you strained your voice? Singing, yelling, or talking more or louder than usual can cause voice strain. Select one.    No   Not selected:    Yes    I'm not sure   Do you have any of these symptoms in your ear(s)? Select all that apply.    Pain    Pressure   Not selected:    Fullness    Crackling or popping    Plugged or blocked sensation    None of the above   Can you move your chin toward your chest?    Yes   Not selected:    No, my neck is too stiff   Are your tonsils larger than usual?    Yes   Not selected:    No    I've had my tonsils removed    I'm not sure   Is there any white or yellow pus on your tonsils?    No   Not selected:    Yes    I'm not sure   Are there red spots on the roof of your mouth or the back of your throat?    I'm not sure   Not selected:    Yes    No   Are your glands/lymph nodes swollen, or does it hurt when you touch them?    Yes   Not selected:    No    I'm not sure   People with a very high body mass index (BMI) are at higher risk for developing complications from the flu and severe illness from COVID-19. To determine your BMI, we need to know your weight and height. Please enter your weight (in pounds).    Weight   Please enter your height.    Height   In the past 2 weeks, has anyone around you (such as at school, work, or home) had a confirmed diagnosis of strep throat? A confirmed diagnosis means that a throat swab and lab test were done to verify a strep throat infection. Select one.    No   Not selected:    Yes    I'm not sure   Do you think you might have strep throat? Select one.    I'm not sure   Not selected:    Yes    No   In the past week, has anyone around you (such as at school, work, or home) had a confirmed diagnosis of the flu? A confirmed diagnosis means that a nose swab was done to verify a flu  infection. Select all that apply.    No   Not selected:    I live with someone who has the flu    I've been within touching distance of someone who has the flu    I've walked by, or sat about 3 feet away from, someone who has the flu    I've been in the same building as someone who has the flu    I'm not sure   Have you ever been diagnosed with asthma? Select one.    No   Not selected:    Yes   Have you ever been prescribed albuterol to use for wheezing, cough, or shortness of breath caused by a cold, bronchitis, or pneumonia? Albuterol (ProAir, Proventil, Ventolin) is prescribed as an inhaler or a solution to be used with a nebulizer machine. Select one.    No   Not selected:    Yes    I'm not sure   Have you ever been prescribed a steroid inhaler to use for wheezing, cough, or shortness of breath caused by a cold, bronchitis, or pneumonia? Some examples of steroid inhalers include Pulmicort, Flovent, Qvar, and Alvesco. Select one.    No   Not selected:    Yes    I'm not sure   Have you ever been diagnosed with chronic obstructive pulmonary disease (COPD)? Select one.    No   Not selected:    Yes    I'm not sure   Do you have allergies (pollen, dust mites, mold, animal dander)? Select one.    Yes   Not selected:    No    I'm not sure   What kind of allergies do you have? Select all that apply.    Seasonal allergies (hay fever)   Not selected:    Pet allergies    Dust allergies    None of the above    I'm not sure   Do you think your symptoms could be allergy-related? Select one.    I'm not sure   Not selected:    Yes    No   Have you had a flu shot this season? Select one.    No   Not selected:    Yes, less than 2 weeks ago    Yes, 2 to 4 weeks ago    Yes, 1 to 3 months ago    Yes, 3 to 6 months ago    Yes, more than 6 months ago    I'm not sure   The flu and COVID-19 can be more serious for people with certain conditions or characteristics. These questions help us figure out if you or anyone you live with is at  higher risk for complications from these infections. Do either of these statements apply to you? Select all that apply.    None of the above   Not selected:    I'm  or Native [place]    I'm a healthcare worker   Do you smoke tobacco? Select one.    No, never   Not selected:    Yes, every day    Yes, some days    No, I quit   Some conditions can put you at risk for more serious infections. Do any of these apply to you? Select all that apply.    None of the above   Not selected:    I've been hospitalized within the last 5 days    I have diabetes    I'm in close contact with a child in    Are you currently being treated for any of these conditions? Scroll to see all options. Select all that apply.    Depression   Not selected:    Aspirin triad (also known as Samter's triad or ASA triad)    Asthma or hives from taking aspirin or other NSAIDs, such as ibuprofen or naproxen    Blockage or narrowing of the blood vessels of the heart    Blood dyscrasia, such anemia, leukemia, lymphoma, or myeloma    Bone marrow depression    Catecholamine-releasing paraganglioma    Blood clotting disorder    Congenital long QT syndrome    Difficulty urinating or completely emptying your bladder    Uncorrected electrolyte abnormalities    Fungal infection    Gastrointestinal (GI) bleeding    Gastrointestinal (GI) obstruction    G6PD deficiency    Recent heart attack    High blood pressure    Irregular heartbeat or heart rhythm    Kidney disease or hemodialysis    Mononucleosis (mono)    Myasthenia gravis    Parkinson's disease    Pheochromocytoma    Reye syndrome    Seizure disorder    Ulcerative colitis    None of the above   Do you have any of these conditions that can affect the immune system? Scroll to see all options. Select all that apply.    None of these   Not selected:    History of bone marrow transplant    Chronic kidney disease    Chronic liver disease (including cirrhosis)    HIV/AIDS    Inflammatory bowel  disease (Crohn's disease or ulcerative colitis)    Lupus    Moderate to severe plaque psoriasis    Multiple sclerosis    Rheumatoid arthritis    Sickle cell anemia    Alpha or beta thalassemia    History of solid organ transplant (kidney, liver, or heart)    History of spleen removal    An autoimmune disorder not listed here    A condition requiring treatment with long-term use of oral steroids (such as prednisone, prednisolone, or dexamethasone)   Have you ever been diagnosed with cancer? Select one.    No   Not selected:    Yes, I have cancer now    Yes, but I'm in remission   Have you ever had either of these conditions? Select all that apply.    No   Not selected:    Metoclopramide-associated dystonic reaction    Tardive dyskinesia   Do any of these apply to the people who live with you? Select all that apply.    A child under the age of 5   Not selected:    An adult 65 or older    A person who is pregnant    A person who has given birth, had a miscarriage, had a pregnancy loss, or had an  in the last 2 weeks    An  or Native [place]    None of the above   Does any member of your household have any of these medical conditions? Select all that apply.    None of the above   Not selected:    Asthma    Disorders of the brain, spinal cord, or nerves and muscles, such as dementia, cerebral palsy, epilepsy, muscular dystrophy, or developmental delay    Chronic lung disease, such as COPD or cystic fibrosis    Heart disease, such as congenital heart disease, congestive heart failure, or coronary artery disease    Cerebrovascular disease, such as stroke or another condition affecting the blood vessels or blood supply to the brain    Blood disorders, such as sickle cell disease    Diabetes    Metabolic disorders such as inherited metabolic disorders or mitochondrial disease    Kidney disorders    Liver disorders    Weakened immune system due to illness or medications such as chemotherapy or  steroids    Children under the age of 19 who are on long-term aspirin therapy    Extreme obesity (BMI > 40)   Are you pregnant? Select one.    No   Not selected:    Yes   When was your last menstrual period? If you don't currently have periods or no longer have periods, please briefly explain.    Currently don't have periods due to birth control   Within the last 2 weeks, have you: - Given birth - Had a miscarriage - Had a pregnancy loss - Had an  Being postpartum (live birth or loss) within the last 2 weeks increases your risk of flu complications. Select one.    No   Not selected:    Yes   Are you breastfeeding? Select one.    No   Not selected:    Yes   Just a few more questions about medications, and then you're finished. Have you used any non-prescription medications or nasal sprays for your current symptoms? Examples include saline sprays, decongestants, NyQuil, and Tylenol. Select one.    Yes   Not selected:    No   Which of these non-prescription medications have you tried? Scroll to see all options. Select all that apply.    Cetirizine (Zyrtec)    Fluticasone (Flonase)    Ibuprofen (Advil, Motrin, Midol)   Not selected:    Acetaminophen (Tylenol)    Budesonide (Rhinocort)    Chlorpheniramine (Aller-chlor, Chlor-Trimeton)    Cromolyn (NasalCrom)    Dextromethorphan (Delsym, Robitussin, Vicks DayQuil Cough)    Diphenhydramine (Benadryl)    Fexofenadine (Allegra)    Guaifenesin (Mucinex)    Guaifenesin/dextromethorphan (Delsym DM, Mucinex DM, Robitussin DM)    Ketotifen (Alaway, Zaditor)    Loratadine (Alavert, Claritin)    Naphazoline-pheniramine (Naphcon-A, Opcon-A, Visine-A)    Omeprazole (Prilosec)    Oxymetazoline (Afrin)    Phenylephrine (Sudafed)    Triamcinolone (Nasacort)    None of the above   In the past month, have you taken antibiotics for similar symptoms? Examples of antibiotics include amoxicillin, amoxicillin-clavulanate (Augmentin), penicillin, cefdinir (Omnicef), doxycycline, and  "clindamycin (Cleocin). Select one.    Yes   Not selected:    No    I'm not sure   Have you taken any monoamine oxidase inhibitor (MAOI) medications in the last 14 days? Examples include rasagiline (Azilect), selegiline (Eldepryl, Zelapar), isocarboxazid (Marplan), phenelzine (Nardil), and tranylcypromine (Parnate). Select one.    No   Not selected:    Yes    I'm not sure   Do you take Kynmobi or Apokyn (apomorphine)? Select one.    No   Not selected:    Yes    I'm not sure   Are you taking any other medications or supplements? On the next screen, you need to list all vitamins, supplements, non-prescription medications (such as aspirin or Aleve), and prescription medications that you're taking. Select one.    Yes   Not selected:    Yes, but I'm not sure what they are    No   Have you ever had an allergic or bad reaction to any medication? Select one.    Yes   Not selected:    No   Have you had an allergic or bad reaction to any of these medications? Select all that apply.    No, not that I know of   Not selected:    Baloxavir (Xofluza)    Benzonatate (Tessalon Perles)    Fluconazole, itraconazole, or terconazole (brands include Diflucan, Sporanox, Terazol)    Oseltamivir (Tamiflu) or zanamivir (Relenza)    Paxlovid, nirmatrelvir, or ritonavir (Norvir)   Have you had an allergic or bad reaction to any of these antibiotic medications? Select all that apply.    Penicillin or any \"-cillin\" antibiotic, such as amoxicillin, ampicillin, dicloxacillin, nafcillin, or piperacillin (Brands include Augmentin, Unasyn, and Zosyn)   Not selected:    Tetracycline or any \"-cycline\" antibiotic, such as doxycycline, demeclocycline, minocycline (Brands include Declomycin, Doryx, Dynacin, Oracea, Monodox, Panmycin, and Vibramycin)    Ciprofloxacin or any \"-floxacin\" antibiotic, such as gemifloxacin, levofloxacin, moxifloxacin, or ofloxacin (Brands include Factive, Cipro, Floxin, and Levaquin)    Cephalexin or any \"cef-\" antibiotic, such " "as cefazolin, cefdinir, cefuroxime, ceftriaxone, ceftazidime, or cefepime (Brands include Ancef, Ceftin, Fortaz, Keflex, Maxipime, Rocephin, and Simplicef)    Azithromycin or any \"-thromycin\" antibiotic, such as erythromycin or clarithromycin (Brands include Biaxin, Erythrocin, Z-marisa, and Zithromax)    Clindamycin or lincomycin (Brands include Cleocin and Lincocin)    None of the above    I'm not sure   Have you had an allergic or bad reaction to any of these medications? Select all that apply.    None of the above   Not selected:    Albuterol or a similar medication    Corticosteroid (steroid) medication, including topical steroids, inhaled steroids, nasal steroids, or oral steroids (budesonide, ciclesonide, dexamethasone, flunisolide, fluticasone, methylprednisolone, triamcinolone, prednisone (or brand names Alvesco, Deltasone, Flovent, Medrol, Nasacort, Rhinocort, or Veramyst)    Metoclopramide (Reglan)    Ondansetron (Zuplenz, Zofran ODT, Zofran)    Prochlorperazine (Compazine)    I'm not sure   Have you had an allergic or bad reaction to any of these eye drops or nasal sprays? Scroll to see all options. Select all that apply.    None of the above   Not selected:    Azelastine (Astelin, Astepro, Optivar)    Cromolyn (Crolom, NasalCrom)    Ipratropium (Atrovent)    Ketotifen (Alaway, Zaditor)    Pheniramine/naphazoline (Naphcon-A, Opcon-A, Visine-A)    Olopatadine (Pataday, Patanol, Pazeo)    I'm not sure   Have you had an allergic or bad reaction to any of these non-prescription medications? Scroll to see all options. Select all that apply.    None of the above   Not selected:    Acetaminophen (Tylenol)    Aspirin    Cetirizine (Zyrtec)    Chlorpheniramine (Aller-chlor, Chlor-Trimeton)    Dextromethorphan (Delsym, Robitussin, Vicks DayQuil Cough)    Diphenhydramine (Benadryl)    Fexofenadine (Allegra)    Guaifenesin (Mucinex)    Guaifenesin/dextromethorphan (Delsym DM, Mucinex DM, Robitussin DM)    Ibuprofen " (Advil, Motrin, Midol)    Loratadine (Alavert, Claritin)    Oxymetazoline (Afrin)    Phenylephrine (Sudafed)    I'm not sure   Are you allergic to milk or to the proteins found in milk (for example, whey or casein)? A milk allergy is different from lactose intolerance. Select one.    No   Not selected:    Yes    I'm not sure   Have you ever had jaundice or liver problems as a result of taking azithromycin (Zithromax, Zmax)? Jaundice is a condition in which the skin and the whites of the eyes turn yellow. Select all that apply.    No, not that I know of   Not selected:    Yes, jaundice    Yes, liver problems   Do you need a doctor's note? A doctor's note confirms that you received care today and states when you can return to school or work. It does not contain information about your diagnosis or treatment plan. Your provider will make the final decision on whether to give you a doctor's note and for how long. Doctor's notes CANNOT be backdated. We can't provide medical leave paperwork through this type of visit. If more paperwork is needed to request time off, contact your primary care provider. Select one.    No   Not selected:    Today only (1 day)    Today and tomorrow (2 days)    3 days    7 days    10 days    14 days   Is there anything else you'd like to tell us about your symptoms?    Throat pain started on the left side and has now shifted to the right. Pain is also in my right ear.   ----------   Medical history   The following information was received from the EMR on May 01, 2022.   Allergies:    AMOXICILLIN   - Allergy Type: Medication   - Reaction:   - Severity:   - Clinical Status: Active   - Verification Status: Confirmed    IMITREX [SUMATRIPTAN]   - Allergy Type: Medication   - Reaction: Nausea And Vomiting, Nausea Only   - Severity: None   - Clinical Status: Active   - Verification Status: Confirmed    PENICILLINS   - Allergy Type: Medication   - Reaction: Other (See Comments)   - Severity: None    - Clinical Status: Active   - Verification Status: Confirmed   Medications:    FERROUS SULFATE 325 (65 FE) MG PO TABS   - Route: Oral   - Start Date: August 07, 2017   - End Date: None   - Status: Active    LEVOFLOXACIN 500 MG PO TABS   - Route: Oral   - Start Date: November 23, 2021   - End Date: None   - Status: Active    DOXYCYCLINE HYCLATE 100 MG PO CAPS   - Route: Oral   - Start Date: December 13, 2021   - End Date: None   - Status: Active    CLASSIC PRENATAL 28-0.8 MG PO TABS   - Route: Oral   - Start Date: July 12, 2017   - End Date: None   - Status: Active    OMEPRAZOLE 20 MG PO CPDR   - Route: Oral   - Start Date: August 07, 2017   - End Date: None   - Status: Active    IBUPROFEN 800 MG PO TABS   - Route: Oral   - Start Date: January 01, 2021   - End Date: None   - Status: Active    BENZONATATE 200 MG PO CAPS   - Route: Oral   - Start Date: November 23, 2021   - End Date: None   - Status: Active    JULEBER 0.15-30 MG-MCG PO TABS   - Route: Oral   - Start Date: November 23, 2021   - End Date: None   - Status: Active   Problem list:    Diagnosis unknown   - Category: Problem List Item   - Health Status:   - Start Date: July 09, 2017   - End Date: October 02, 2017   - Status: Resolved    Anemia   - Category: Problem List Item   - Health Status:   - Start Date: July 13, 2017   - End Date: October 02, 2017   - Status: Resolved    Non-reassuring electronic fetal monitoring tracing   - Category: Problem List Item   - Health Status:   - Start Date: August 07, 2017   - End Date: October 02, 2017   - Status: Resolved    Pregnancy   - Category: Problem List Item   - Health Status:   - Start Date: September 25, 2017   - End Date: October 02, 2017   - Status: Resolved    Normal labor   - Category: Problem List Item   - Health Status:   - Start Date: October 02, 2017   - End Date: October 02, 2017   - Status: Resolved    Pregnancy   - Category: Problem List Item   - Health Status:   - Start Date: December 21, 2020    - End Date: 2020   - Status: Resolved    Maternal care due to low transverse uterine scar from previous  delivery   - Category: Problem List Item   - Health Status:   - Start Date: 2020   - End Date: 2021   - Status: Resolved     delivery delivered   - Category: Problem List Item   - Health Status:   - Start Date: 2020   - End Date: None   - Status: Active

## 2022-05-01 NOTE — EXTERNAL PATIENT INSTRUCTIONS
Diagnosis   Strep pharyngitis (strep throat)   My name is Aurea Stein, and I'm a healthcare provider at [facility]. I reviewed your interview, and I see that you have strep throat. This can be a painful condition, but it responds well to treatment.   To prevent the spread of illness to others, I recommend that you stay home and away from other people as much as possible while you're sick.   Medications   Your pharmacy   [facility] [address] [place] [phone]     Prescription   Azithromycin (250mg): On day 1 only, take 2 tablets (500mg) at the same time for infection. Then, on days 2-5, take 1 tablet (250mg) once a day. This medication is an antibiotic. Take it exactly as directed. You must finish the entire course of medication, even if you feel better after the first few days of treatment.    Start taking the antibiotics I've prescribed right away. You need to finish the entire course of antibiotics, even if you start to feel better before the pills run out.   Some women develop yeast infections after taking antibiotics. If you develop a yeast infection, you can treat it with antifungal creams or suppositories. These are available without a prescription at [employer] and many supermarkets.   About your diagnosis   Strep throat is an infection in the throat and tonsils caused by group A streptococcus bacteria. Strep throat is most often spread by droplets that are released into the air after an infected person coughs or sneezes. You can also get strep throat by sharing cups or utensils with an infected person.   Symptoms usually begin within 2 to 5 days after a person has been exposed. The pain from strep throat usually starts quickly and can be severe, especially when swallowing. Body aches and pains are common.   What to expect   If you follow this treatment plan, you should start to feel better within a few days.   When to seek care   Call us at [phone]   with any sudden or  "unexpected symptoms.    Symptoms that don't improve within 48 hours of starting antibiotics.    Symptoms that get better for a few days and then suddenly get worse.    Worsening fever.    Your throat pain becomes worse and makes swallowing extremely difficult or impossible.    Muffled voice (it may sound like you are talking with a mouthful of food).    Difficulty opening your mouth or jaw.    Stiff neck.    Joint pain, or swelling that moves from joint to joint.    Severe stomach pain.    Shortness of breath.    Nosebleed.    Severe fatigue.    A new rash.    Odd emotional or behavioral changes.    Uncontrollable body movements or \"jerkiness.\"    Severe chest pain.   Other treatment    Rest and drink plenty of water.    Choose throat-friendly liquids and foods, such as lemon tea, broth, applesauce, frozen yogurt, or popsicles.    Gargle with salt water several times a day to help with your throat pain.    Try cough drops and throat lozenges for extra relief.    Stir a teaspoon of honey into warm water or weak tea to help soothe a sore throat.    Avoid smoke and air pollution. Smoke can make infections worse.   Prevention    Avoid close contact with other people when you're sick.    Cover your mouth and nose when you cough or sneeze. Use a tissue or cough into your elbow. Make sure that used tissues go directly into the trash.    Avoid touching your eyes, nose, or mouth while you're sick.    Wash your hands often, especially after coughing, sneezing, or blowing your nose. If soap and water are not available, use an alcohol-based hand .    If you or someone in your home or workplace is sick, disinfect commonly used items. This includes door handles, tables, computers, remotes, and pens.    Coronavirus (COVID-19) information   Common symptoms of COVID-19 include fever, cough, shortness of breath, fatigue, muscle or body aches, headaches, new loss of sense of taste or smell, sore throat, stuffy or runny nose, " nausea or vomiting, and diarrhea. Most people who get COVID-19 have mild symptoms and can rest at home until they get better. Elderly people and those with chronic medical problems may be at risk for more serious complications.   FAQs about the COVID-19 vaccine   There are three authorized COVID-19 vaccines: Gagan & Gagan's Harriett Vaccine (J&J/Harriett), Moderna, and Pfizer-[place] [employer] (Pfizer). The J&J/Harriett and Moderna vaccines are approved for use in people aged 18 and older. The Pfizer vaccine is approved for those aged 5 and older. All three are available at no cost. Even if you don't have health insurance, you can still get the COVID-19 vaccine for free.   Which vaccine is the best? Which vaccine should I get?   All three vaccines are highly effective. Even if you get COVID-19 after being vaccinated, all of the vaccines help prevent severe disease, hospitalization, and complications.   Most people should get whichever vaccine is first available to them. However, women younger than 50 years old should consider the rare risk of blood clots with low platelets after vaccination with the J&J/Harriett vaccine. This risk hasn't been seen with the other two vaccines.   Are the vaccines safe?   Yes. Hundreds of millions of people in the [place] have already safely received COVID-19 vaccines. As part of Phase 3 clinical trials in the [place] and other countries, researchers collected safety and efficacy data for all three vaccines. These clinical trials follow strict standards. Before a vaccine is approved, the manufacturing company must submit data to the Food and Drug Administration (FDA) for review. Tens of thousands of volunteers participated in the clinical trials for the vaccines. The FDA continues to monitor safety data as the vaccines are given to the general population.   Do I need the vaccine if I've already had COVID?   Yes. Vaccination helps protect you even if you've already had COVID.   If you had COVID-19 and had  symptoms, wait to get vaccinated until ALL of the following are true:    5 full days since your symptoms started    24 hours with no fever, without the use of fever-reducing medications    Your other COVID-19 symptoms are improving   If you tested positive for COVID-19 but did not have symptoms, you can get vaccinated after 5 full days have passed since you had a positive test, as long as you don't develop symptoms.   If you had COVID-19 and were treated with monoclonal antibodies, you should wait 90 days before getting a COVID-19 vaccination.   How many doses of the vaccine do I need?   Visit https://www.cdc.gov/coronavirus/2019-ncov/vaccines/stay-up-to-date.html   to find out how to stay up to date with your COVID-19 vaccines.   I'm immunocompromised. How many doses of the vaccine do I need?   For information on how immunocompromised people can stay up to date with their COVID-19 vaccines, visit https://www.cdc.gov/coronavirus/2019-ncov/vaccines/recommendations/immuno.html  .   What are the common side effects of the vaccine?   A sore arm, tiredness, headache, and muscle pain may occur within two days of getting the vaccine and last a day or two. For the Moderna or Pfizer vaccines, side effects are more common after the second dose. People over the age of 55 are less likely to have side effects than younger people.   After I'm up to date on vaccines, can I still get or spread COVID?   Yes, but your disease should be milder. And your risk of serious illness, hospitalization, and complications will be much lower, especially if you're up to date. Being vaccinated reduces the risk of spreading the disease if you get it.   After I'm up to date on vaccines, can I go back to normal?   You should still wear a mask indoors in public if:    It's required by laws, rules, regulations, or local guidance.    You have a weakened immune system.    Your age puts you at increased risk of severe disease.    You have a medical  condition that puts you at increased risk of severe disease.    Someone in your household has a weakened immune system, is at increased risk for severe disease, or is unvaccinated.    You're in an area of high transmission.   Where can I get a COVID-19 vaccine?   Visit [facility]'s website for more information. To find a COVID-19 vaccination site near you, visit https://www.[employer].gov/  , call [phone]  , or text your zip code to [phone] ([phone]). Message and data rates may apply.   What about travel?   For information before your trip, see https://www.cdc.gov/coronavirus/2019-ncov/travelers/index.html  . Travel increases your risk of exposure to COVID-19. For information on what to do after you've returned, see https://www.cdc.gov/coronavirus/2019-ncov/php/risk-assessment.html  .   I've had close contact with someone who has COVID. Do I need to quarantine, and if so, for how long?   For the most current answer, including a calculator to determine whether you need to stay home and for how long, visit https://www.cdc.gov/coronavirus/2019-ncov/your-health/quarantine-isolation.html  .   I've had a positive test result for COVID. How long do I need to isolate?   For the latest recommendations, including a calculator to determine how long you need to stay home, visit https://www.cdc.gov/coronavirus/2019-ncov/your-health/quarantine-isolation.html  .   What if I develop symptoms that might be from COVID?   For the latest recommendations on what to do if you're sick, including when to seek emergency care, visit https://www.cdc.gov/coronavirus/2019-ncov/if-you-are-sick/steps-when-sick.html  .    Flu vaccine information   Who should get a flu vaccine?   Everyone 6 months of age and older should get a yearly flu vaccine.   When should I get vaccinated?   You should get a flu vaccine by the end of October. Once you're vaccinated, it takes about two weeks for antibodies to develop and protect you against the flu.  That's why it's important to get vaccinated as soon as possible.   After October, is it too late to get vaccinated?   No. You should still get vaccinated. As long as the flu viruses are still in your community, flu vaccines will remain available, even into January of next year or later.   Why do I need a flu vaccine EVERY year?   Flu viruses are constantly changing, so flu vaccines are usually updated from one season to the next. Your protection from the flu vaccine also lessens over time.   Is the flu vaccine safe?   Yes. Over the last 50 years, hundreds of millions of Americans have safely received the flu vaccines.   What are the side effects of flu vaccines?   You CANNOT get the flu from a flu vaccine. Common side effects of the flu shot include soreness, redness and/or swelling where the shot was given, low grade fever, and aches. Common side effects of the nasal spray flu vaccine for adults include runny nose, headaches, sore throat, and cough. For children, side effects include wheezing, vomiting, muscle aches, and fever.   Does the flu vaccine increase your risk of getting COVID-19?   No. There is no evidence that getting a flu vaccine increases your risk of getting COVID-19.   Is it safe to get the flu vaccine along with a COVID-19 vaccine?   Yes. It's safe to get the flu vaccine with a COVID-19 vaccine or booster.   Contact your healthcare provider TODAY for details on when and where to get your flu vaccine.   Your provider   Your diagnosis was provided by Aurea Stein, a member of your trusted care team at [facility].   If you have any questions, call us at [phone]  .

## 2022-05-29 ENCOUNTER — NURSE TRIAGE (OUTPATIENT)
Dept: CALL CENTER | Facility: HOSPITAL | Age: 32
End: 2022-05-29

## 2022-05-30 NOTE — TELEPHONE ENCOUNTER
"    Reason for Disposition  • [1] MILD pain (e.g., does not interfere with normal activities) AND [2] pain comes and goes (cramps) AND [3] present > 48 hours  (Exception: this same abdominal pain is a chronic symptom recurrent or ongoing AND present > 4 weeks)    Additional Information  • Negative: Shock suspected (e.g., cold/pale/clammy skin, too weak to stand, low BP, rapid pulse)  • Negative: Difficult to awaken or acting confused (e.g., disoriented, slurred speech)  • Negative: Passed out (i.e., lost consciousness, collapsed and was not responding)  • Negative: Sounds like a life-threatening emergency to the triager  • Negative: Chest pain  • Negative: Pain is mainly in upper abdomen  (if needed ask: \"is it mainly above the belly button?\")  • Negative: Followed an abdomen (stomach) injury  • Negative: [1] Abdominal pain AND [2] pregnant < 20 weeks  • Negative: [1] Abdominal pain AND [2] pregnant 20 or more weeks  • Negative: [1] Abdominal pain AND [2] postpartum (from 0 to 6 weeks after delivery)  • Negative: [1] SEVERE pain (e.g., excruciating) AND [2] present > 1 hour  • Negative: [1] SEVERE pain AND [2] age > 60 years  • Negative: [1] Vomiting AND [2] contains red blood or black (\"coffee ground\") material  (Exception: few red streaks in vomit that only happened once)  • Negative: Blood in bowel movements (Exception: blood on surface of BM with constipation)  • Negative: Black or tarry bowel movements (Exception: chronic-unchanged black-grey bowel movements AND is taking iron pills or Pepto-bismol)  • Negative: Patient sounds very sick or weak to the triager  • Negative: [1] MILD-MODERATE pain AND [2] constant AND [3] present > 2 hours  • Negative: [1] Vomiting AND [2] abdomen looks much more swollen than usual  • Negative: [1] Vomiting AND [2] contains bile (green color)  • Negative: White of the eyes have turned yellow (i.e., jaundice)  • Negative: Fever > 103 F (39.4 C)  • Negative: [1] Fever > 101 F (38.3 " "C) AND [2] age > 60 years  • Negative: [1] Fever > 100.0 F (37.8 C) AND [2] bedridden (e.g., nursing home patient, CVA, chronic illness, recovering from surgery)  • Negative: [1] Fever > 100.0 F (37.8 C) AND [2] diabetes mellitus or weak immune system (e.g., HIV positive, cancer chemo, splenectomy, organ transplant, chronic steroids)  • Negative: [1] SEVERE pain AND [2] present < 1 hour  • Negative: [1] MODERATE pain (e.g., interferes with normal activities) AND [2] pain comes and goes (cramps) AND [3] present > 24 hours  (Exception: pain with Vomiting or Diarrhea - see that Guideline)    Answer Assessment - Initial Assessment Questions  1. LOCATION: \"Where does it hurt?\"       Left shoulder to top of kidney  2. RADIATION: \"Does the pain shoot anywhere else?\" (e.g., chest, back)      *no  3. ONSET: \"When did the pain begin?\" (e.g., minutes, hours or days ago)       2 days   4. SUDDEN: \"Gradual or sudden onset?\"      sudden  5. PATTERN \"Does the pain come and go, or is it constant?\"     - If constant: \"Is it getting better, staying the same, or worsening?\"       (Note: Constant means the pain never goes away completely; most serious pain is constant and it progresses)      - If intermittent: \"How long does it last?\" \"Do you have pain now?\"      (Note: Intermittent means the pain goes away completely between bouts)      constant  6. SEVERITY: \"How bad is the pain?\"  (e.g., Scale 1-10; mild, moderate, or severe)    - MILD (1-3): doesn't interfere with normal activities, abdomen soft and not tender to touch     - MODERATE (4-7): interferes with normal activities or awakens from sleep, tender to touch     - SEVERE (8-10): excruciating pain, doubled over, unable to do any normal activities      moderate  7. RECURRENT SYMPTOM: \"Have you ever had this type of stomach pain before?\" If Yes, ask: \"When was the last time?\" and \"What happened that time?\"       no  8. CAUSE: \"What do you think is causing the stomach pain?\"      " "*No Answer*  9. RELIEVING/AGGRAVATING FACTORS: \"What makes it better or worse?\" (e.g., movement, antacids, bowel movement)      *No Answer*  10. OTHER SYMPTOMS: \"Has there been any vomiting, diarrhea, constipation, or urine problems?\"        *No Answer*  11. PREGNANCY: \"Is there any chance you are pregnant?\" \"When was your last menstrual period?\"        *No Answer*    Protocols used: ABDOMINAL PAIN - FEMALE-ADULT-AH      "

## 2022-05-31 ENCOUNTER — OFFICE VISIT (OUTPATIENT)
Dept: INTERNAL MEDICINE | Facility: CLINIC | Age: 32
End: 2022-05-31

## 2022-05-31 VITALS
TEMPERATURE: 98.4 F | SYSTOLIC BLOOD PRESSURE: 104 MMHG | RESPIRATION RATE: 18 BRPM | HEIGHT: 67 IN | DIASTOLIC BLOOD PRESSURE: 72 MMHG | WEIGHT: 208 LBS | OXYGEN SATURATION: 97 % | HEART RATE: 86 BPM | BODY MASS INDEX: 32.65 KG/M2

## 2022-05-31 DIAGNOSIS — R11.0 NAUSEA: ICD-10-CM

## 2022-05-31 DIAGNOSIS — R10.9 LEFT SIDED ABDOMINAL PAIN OF UNKNOWN CAUSE: ICD-10-CM

## 2022-05-31 DIAGNOSIS — R10.9 LEFT FLANK PAIN: Primary | ICD-10-CM

## 2022-05-31 DIAGNOSIS — R06.02 SHORTNESS OF BREATH: ICD-10-CM

## 2022-05-31 PROBLEM — Q37.9: Status: ACTIVE | Noted: 2017-06-07

## 2022-05-31 PROBLEM — M25.569 KNEE PAIN: Status: ACTIVE | Noted: 2022-05-31

## 2022-05-31 PROBLEM — M25.519 SHOULDER PAIN: Status: ACTIVE | Noted: 2022-05-31

## 2022-05-31 PROBLEM — N20.0 KIDNEY STONE: Status: ACTIVE | Noted: 2017-05-22

## 2022-05-31 PROCEDURE — 99213 OFFICE O/P EST LOW 20 MIN: CPT | Performed by: NURSE PRACTITIONER

## 2022-05-31 RX ORDER — HYDROCODONE BITARTRATE AND ACETAMINOPHEN 5; 325 MG/1; MG/1
1 TABLET ORAL EVERY 6 HOURS PRN
Qty: 12 TABLET | Refills: 0 | Status: SHIPPED | OUTPATIENT
Start: 2022-05-31 | End: 2022-07-29

## 2022-05-31 RX ORDER — METRONIDAZOLE 500 MG/1
500 TABLET ORAL 3 TIMES DAILY
Qty: 21 TABLET | Refills: 0 | Status: SHIPPED | OUTPATIENT
Start: 2022-05-31 | End: 2022-07-29

## 2022-05-31 NOTE — PROGRESS NOTES
Answers for HPI/ROS submitted by the patient on 5/31/2022  Please describe your symptoms.: Left shoulder, left rib, and left abdominal pain, as well as back pain on the left side. Pain is causing painful breathing. Very hard to lay down  Have you had these symptoms before?: No  How long have you been having these symptoms?: 1-4 days  Please list any medications you are currently taking for this condition.: Gas-X and ibuprofen  Please describe any probable cause for these symptoms. : I feel like it could be a gas bubble but I have never experienced anything like this and for this long  What is the primary reason for your visit?: Other            Subjective     Chief Complaint   Patient presents with   • Abdominal Pain   • Shoulder Pain   • Back Pain     Symptoms present for 4 days. All on the Left Side.       History of Present Illness  Pt presents today with c/o left sided flank pain that radiates to the left side of her abdomen and up to her left shoulder. Pain has been present for 4 days. She states she took a pregnancy test yesterday that was negative, she is on birth control and does not have periods with it. When she lays flat or on her side the pain or leaning forward gets worse and she becomes short of breath. Pressure on her stomach makes the pain worse. No alleviating factors noted. Food does not seem to aggravate or alleviate. She has been taking Ibuprofen since the pain started but it has not helped much.     Review of Systems   Constitutional: Negative for appetite change, chills, diaphoresis, fatigue, fever and unexpected weight change.   HENT: Negative for congestion, ear pain, postnasal drip, sore throat and trouble swallowing.    Eyes: Negative for pain and visual disturbance.   Respiratory: Positive for shortness of breath (with pressure to abdomen). Negative for cough, chest tightness and wheezing.    Cardiovascular: Positive for chest pain (with breathing, sharp, left sided). Negative for  palpitations and leg swelling.   Gastrointestinal: Positive for abdominal pain (left sided). Negative for blood in stool, constipation, diarrhea, nausea and vomiting.   Endocrine: Negative for cold intolerance, heat intolerance, polydipsia, polyphagia and polyuria.   Genitourinary: Positive for flank pain (left). Negative for difficulty urinating, dysuria, frequency and urgency.   Musculoskeletal: Positive for arthralgias (left shoulder). Negative for back pain (left flank) and gait problem.   Skin: Negative for rash.   Neurological: Negative for dizziness, seizures, syncope, weakness and headaches.   Hematological: Does not bruise/bleed easily.   Psychiatric/Behavioral: Negative for confusion. The patient is not nervous/anxious.       Past Medical History:   Past Medical History:   Diagnosis Date   • Anemia    • Chronic kidney disease     stones   • Depression 2022   • Kidney stone     [facility] in    • Migraine     Stress related   • Ovarian cyst      Past Surgical History:  Past Surgical History:   Procedure Laterality Date   •  SECTION N/A 10/02/2017    Procedure:  SECTION PRIMARY;  Surgeon: Cecily Riggs MD;  Location: [facility] LABOR DELIVERY;  Service:    •  SECTION N/A 2020    Procedure: REPEAT  SECTION WITHOUT TUBAL LIGATION;  Surgeon: Cecily Riggs MD;  Location: [facility] LABOR DELIVERY;  Service: Obstetrics/Gynecology;  Laterality: N/A;   • CLEFT LIP REPAIR     • CLEFT PALATE REPAIR      8 surgeries   • COSMETIC SURGERY      Cleft lip repair [id number]   • HAND OPEN REDUCTION INTERNAL FIXATION     • KNEE ARTHROSCOPY MENISCUS TRANSPLANT Right    • KNEE SURGERY      piece of femur broke off into knee     Social History:  reports that she has never smoked. She has never used smokeless tobacco. She reports that she does not drink alcohol and does not use drugs.    Family History: family history includes Alcohol abuse in her father; Birth defects in her  father; Breast cancer in her paternal grandmother; Cancer in her maternal grandfather and paternal grandmother; Depression in her mother; Hypertension in her father; Melanoma in her paternal grandmother; Prostate cancer in her paternal grandfather; Thyroid disease in her mother.       Allergies:  Allergies   Allergen Reactions   • Amoxicillin      reaction as a baby, pt told by mother not to take amoxicillin.   • Imitrex [Sumatriptan] Nausea And Vomiting and Nausea Only   • Penicillins Other (See Comments)     Makes her crazy - mean.      Medications:  Prior to Admission medications    Medication Sig Start Date End Date Taking? Authorizing Provider   ferrous sulfate 325 (65 FE) MG tablet Take 325 mg by mouth 3 (Three) Times a Day With Meals.    Alberto Galeas MD   ibuprofen (ADVIL,MOTRIN) 800 MG tablet Take 1 tablet by mouth Every 8 (Eight) Hours As Needed for Mild Pain  or Moderate Pain  (DO NOT START UNTIL COMPLETION OF TORADOL). 1/1/21   Cecily Riggs MD   sertraline (ZOLOFT) 50 MG tablet Take 50 mg by mouth Daily. 5/26/22   Alberto Galeas MD   benzonatate (TESSALON) 200 MG capsule Take 1 capsule by mouth 3 (Three) Times a Day As Needed for Cough. 11/23/21 5/31/22  Cesilia Benítez APRN   doxycycline (VIBRAMYCIN) 100 MG capsule Take 1 capsule by mouth 2 (Two) Times a Day. 12/13/21 5/31/22  Cesilia Benítez APRN Juleber 0.15-30 MG-MCG per tablet Take 1 tablet by mouth Daily. 11/12/21 5/31/22  Alberto Galeas MD   levoFLOXacin (Levaquin) 500 MG tablet Take 1 tablet by mouth Daily. 11/23/21 5/31/22  Cesilia Benítez APRN   omeprazole (priLOSEC) 20 MG capsule Take 20 mg by mouth Daily.  5/31/22  Alberto Galeas MD   Prenatal Vit-Fe Fumarate-FA (PRENATAL, CLASSIC, VITAMIN) 28-0.8 MG tablet tablet Take 1 tablet by mouth Daily.  5/31/22  Alberto Galeas MD       Objective     Vital Signs: /72 (BP Location: Left arm, Patient Position: Sitting,  "Cuff Size: Adult)   Pulse 86   Temp 98.4 °F (36.9 °C)   Resp 18   Ht 170.2 cm (67\")   Wt 94.3 kg (208 lb)   SpO2 97%   BMI 32.58 kg/m²   Physical Exam  Vitals reviewed.   Constitutional:       Appearance: She is well-developed.   HENT:      Head: Normocephalic and atraumatic.   Eyes:      Pupils: Pupils are equal, round, and reactive to light.   Neck:      Vascular: No JVD.   Cardiovascular:      Rate and Rhythm: Normal rate and regular rhythm.      Heart sounds: Normal heart sounds.   Pulmonary:      Effort: Pulmonary effort is normal.      Breath sounds: Normal breath sounds.   Abdominal:      General: There is no distension.      Palpations: Abdomen is soft. There is no mass.      Tenderness: There is abdominal tenderness (left sided).      Comments: BS hypoactive x4   Musculoskeletal:         General: No deformity.      Cervical back: Normal range of motion and neck supple.   Lymphadenopathy:      Cervical: No cervical adenopathy.   Skin:     General: Skin is warm and dry.   Neurological:      Mental Status: She is alert and oriented to person, place, and time.   Psychiatric:         Behavior: Behavior normal.         Thought Content: Thought content normal.         Judgment: Judgment normal.       BMI is >= 30 and <= 34.9 (Class 1 obesity). The following options were offered after discussion: weight loss educational material (shared in after visit summary), exercise counseling/recommendations and nutrition counseling/recommendations      Results Reviewed:  ALT (SGPT)   Date Value Ref Range Status   12/21/2020 13 1 - 33 U/L Final     AST (SGOT)   Date Value Ref Range Status   12/21/2020 25 1 - 32 U/L Final     WBC   Date Value Ref Range Status   12/31/2020 9.90 3.40 - 10.80 10*3/mm3 Final     Hematocrit   Date Value Ref Range Status   12/31/2020 31.5 (L) 34.0 - 46.6 % Final     Platelets   Date Value Ref Range Status   12/31/2020 190 140 - 450 10*3/mm3 Final         Assessment / Plan "     Assessment/Plan:  Diagnoses and all orders for this visit:    1. Left flank pain (Primary)  -     XR Abdomen KUB (In Office)  -     US Non-ob Transvaginal  -     metroNIDAZOLE (Flagyl) 500 MG tablet; Take 1 tablet by mouth 3 (Three) Times a Day.  Dispense: 21 tablet; Refill: 0  -     HYDROcodone-acetaminophen (Norco) 5-325 MG per tablet; Take 1 tablet by mouth Every 6 (Six) Hours As Needed for Severe Pain .  Dispense: 12 tablet; Refill: 0    2. Nausea  -     POCT pregnancy, urine  -     US Non-ob Transvaginal    3. Shortness of breath  -     XR Chest PA & Lateral (In Office)    4. Left sided abdominal pain of unknown cause  -     metroNIDAZOLE (Flagyl) 500 MG tablet; Take 1 tablet by mouth 3 (Three) Times a Day.  Dispense: 21 tablet; Refill: 0  -     HYDROcodone-acetaminophen (Norco) 5-325 MG per tablet; Take 1 tablet by mouth Every 6 (Six) Hours As Needed for Severe Pain .  Dispense: 12 tablet; Refill: 0    KUB and CXR are normal.   Will await US results.     Return in about 2 days (around 6/2/2022). unless patient needs to be seen sooner or acute issues arise.    Code Status: Full    I have discussed the patient results/orders and and plan/recommendation with them at today's visit.      Cesilia Benítez, APRN   05/31/2022

## 2022-06-02 ENCOUNTER — OFFICE VISIT (OUTPATIENT)
Dept: INTERNAL MEDICINE | Facility: CLINIC | Age: 32
End: 2022-06-02

## 2022-06-02 VITALS
RESPIRATION RATE: 20 BRPM | SYSTOLIC BLOOD PRESSURE: 103 MMHG | HEART RATE: 77 BPM | TEMPERATURE: 98 F | OXYGEN SATURATION: 98 % | BODY MASS INDEX: 32.67 KG/M2 | HEIGHT: 67 IN | DIASTOLIC BLOOD PRESSURE: 67 MMHG | WEIGHT: 208.19 LBS

## 2022-06-02 DIAGNOSIS — R10.9 LEFT FLANK PAIN: Primary | ICD-10-CM

## 2022-06-02 PROCEDURE — 99213 OFFICE O/P EST LOW 20 MIN: CPT | Performed by: NURSE PRACTITIONER

## 2022-06-02 NOTE — PROGRESS NOTES
Subjective     Chief Complaint   Patient presents with   • Flank Pain     Left Side         History of Present Illness  Pt presents for a f/u on flank pain to her left side. She is much improved from Tuesday. She can now lay on her left side when she previously could not. Pain is still present at times in her left shoulder and left flank. She continues on Flagyl and is tolerating well. She has not had to take the Norco. She is able to hold her son with no pain now when she previously would be uncomfortable. Denies fevers. She is no longer short of breath.     Review of Systems   Constitutional: Negative for activity change, appetite change and fever.   HENT: Negative.    Eyes: Negative.    Respiratory: Negative for shortness of breath.    Cardiovascular: Negative for chest pain, palpitations and leg swelling.   Gastrointestinal: Positive for abdominal pain (improving). Negative for constipation, diarrhea and nausea.   Endocrine: Negative.    Genitourinary: Positive for flank pain (left side, improving). Negative for dysuria, frequency and urgency.   Musculoskeletal: Positive for arthralgias (left shoulder, improving).   Skin: Negative.    Allergic/Immunologic: Negative.    Neurological: Negative.    Hematological: Negative.    Psychiatric/Behavioral: Negative.         Otherwise complete ROS reviewed.    Past Medical History:   Past Medical History:   Diagnosis Date   • Anemia    • Chronic kidney disease     stones   • Depression 2022   • Kidney stone     [facility] in    • Migraine     Stress related   • Ovarian cyst      Past Surgical History:  Past Surgical History:   Procedure Laterality Date   •  SECTION N/A 10/02/2017    Procedure:  SECTION PRIMARY;  Surgeon: Cecily Riggs MD;  Location: [facility] LABOR DELIVERY;  Service:    •  SECTION N/A 2020    Procedure: REPEAT  SECTION WITHOUT TUBAL LIGATION;  Surgeon: Cecily Riggs MD;  Location: [facility]  LABOR DELIVERY;  Service: Obstetrics/Gynecology;  Laterality: N/A;   • CLEFT LIP REPAIR     • CLEFT PALATE REPAIR      8 surgeries   • COSMETIC SURGERY      Cleft lip repair [id number]   • HAND OPEN REDUCTION INTERNAL FIXATION     • KNEE ARTHROSCOPY MENISCUS TRANSPLANT Right    • KNEE SURGERY      piece of femur broke off into knee     Social History:  reports that she has never smoked. She has never used smokeless tobacco. She reports that she does not drink alcohol and does not use drugs.    Family History: family history includes Alcohol abuse in her father; Birth defects in her father; Breast cancer in her paternal grandmother; Cancer in her maternal grandfather and paternal grandmother; Depression in her mother; Hypertension in her father; Melanoma in her paternal grandmother; Prostate cancer in her paternal grandfather; Thyroid disease in her mother.       Allergies:  Allergies   Allergen Reactions   • Amoxicillin      reaction as a baby, pt told by mother not to take amoxicillin.   • Imitrex [Sumatriptan] Nausea And Vomiting and Nausea Only   • Penicillins Other (See Comments)     Makes her crazy - mean.      Medications:  Prior to Admission medications    Medication Sig Start Date End Date Taking? Authorizing Provider   ferrous sulfate 325 (65 FE) MG tablet Take 325 mg by mouth 3 (Three) Times a Day With Meals.    Provider, MD Alberto   HYDROcodone-acetaminophen (Norco) 5-325 MG per tablet Take 1 tablet by mouth Every 6 (Six) Hours As Needed for Severe Pain . 5/31/22   Cesilia Benítez APRN   ibuprofen (ADVIL,MOTRIN) 800 MG tablet Take 1 tablet by mouth Every 8 (Eight) Hours As Needed for Mild Pain  or Moderate Pain  (DO NOT START UNTIL COMPLETION OF TORADOL). 1/1/21   Cecily Riggs MD   metroNIDAZOLE (Flagyl) 500 MG tablet Take 1 tablet by mouth 3 (Three) Times a Day. 5/31/22   Cesilia Benítez APRN   sertraline (ZOLOFT) 50 MG tablet Take 50 mg by mouth Daily. 5/26/22    "Provider, MD Alberto       Objective     Vital Signs: /67 (BP Location: Left arm, Patient Position: Sitting, Cuff Size: Adult)   Pulse 77   Temp 98 °F (36.7 °C)   Resp 20   Ht 170.2 cm (67\")   Wt 94.4 kg (208 lb 3 oz)   SpO2 98%   BMI 32.61 kg/m²   Physical Exam  Vitals reviewed.   Constitutional:       Appearance: Normal appearance. She is well-developed.   HENT:      Head: Normocephalic and atraumatic.   Eyes:      Pupils: Pupils are equal, round, and reactive to light.   Neck:      Vascular: No JVD.   Cardiovascular:      Rate and Rhythm: Normal rate and regular rhythm.      Heart sounds: Normal heart sounds.   Pulmonary:      Effort: Pulmonary effort is normal.      Breath sounds: Normal breath sounds.   Abdominal:      General: Bowel sounds are normal. There is no distension.      Palpations: Abdomen is soft.      Tenderness: There is no abdominal tenderness. There is no guarding.   Musculoskeletal:         General: No tenderness or deformity.      Cervical back: Normal range of motion and neck supple.   Lymphadenopathy:      Cervical: No cervical adenopathy.   Skin:     General: Skin is warm and dry.   Neurological:      Mental Status: She is alert and oriented to person, place, and time.   Psychiatric:         Behavior: Behavior normal.         Thought Content: Thought content normal.         Judgment: Judgment normal.       BMI is >= 30 and <= 34.9 (Class 1 obesity). The following options were offered after discussion: weight loss educational material (shared in after visit summary), exercise counseling/recommendations and nutrition counseling/recommendations      Results Reviewed:  ALT (SGPT)   Date Value Ref Range Status   12/21/2020 13 1 - 33 U/L Final     AST (SGOT)   Date Value Ref Range Status   12/21/2020 25 1 - 32 U/L Final     WBC   Date Value Ref Range Status   12/31/2020 9.90 3.40 - 10.80 10*3/mm3 Final     Hematocrit   Date Value Ref Range Status   12/31/2020 31.5 (L) 34.0 - 46.6 " % Final     Platelets   Date Value Ref Range Status   12/31/2020 190 140 - 450 10*3/mm3 Final         Assessment / Plan     Assessment/Plan:  Diagnoses and all orders for this visit:    1. Left flank pain (Primary)    overall improved with oral flagyl. Continue and complete flagyl. If the pain returns, will need CT.     No follow-ups on file. unless patient needs to be seen sooner or acute issues arise.    Code Status: Full    I have discussed the patient results/orders and and plan/recommendation with them at today's visit.      Cesilia Benítez, YULIANA   06/02/2022  Answers for HPI/ROS submitted by the patient on 6/1/2022  Please describe your symptoms.: Shoulder pain - follow up visit  Have you had these symptoms before?: No  How long have you been having these symptoms?: 5-7 days  Please list any medications you are currently taking for this condition.: Flagyl  Please describe any probable cause for these symptoms. : Ruptured ovarian cyst  What is the primary reason for your visit?: Other

## 2022-07-29 ENCOUNTER — OFFICE VISIT (OUTPATIENT)
Dept: INTERNAL MEDICINE | Facility: CLINIC | Age: 32
End: 2022-07-29

## 2022-07-29 VITALS
SYSTOLIC BLOOD PRESSURE: 109 MMHG | RESPIRATION RATE: 18 BRPM | HEIGHT: 67 IN | HEART RATE: 77 BPM | WEIGHT: 202 LBS | BODY MASS INDEX: 31.71 KG/M2 | OXYGEN SATURATION: 99 % | DIASTOLIC BLOOD PRESSURE: 62 MMHG | TEMPERATURE: 98 F

## 2022-07-29 DIAGNOSIS — R21 RASH: Primary | ICD-10-CM

## 2022-07-29 PROCEDURE — 99213 OFFICE O/P EST LOW 20 MIN: CPT

## 2022-07-29 PROCEDURE — 96372 THER/PROPH/DIAG INJ SC/IM: CPT

## 2022-07-29 RX ORDER — TRIAMCINOLONE ACETONIDE 40 MG/ML
40 INJECTION, SUSPENSION INTRA-ARTICULAR; INTRAMUSCULAR ONCE
Status: COMPLETED | OUTPATIENT
Start: 2022-07-29 | End: 2022-07-29

## 2022-07-29 RX ADMIN — TRIAMCINOLONE ACETONIDE 40 MG: 40 INJECTION, SUSPENSION INTRA-ARTICULAR; INTRAMUSCULAR at 13:43

## 2022-07-29 NOTE — PROGRESS NOTES
Subjective     Chief Complaint   Patient presents with   • Rash     Abdomin and chest. Appear one week ago.        History of Present Illness  Patient presents today with a scattered rash on abdomen and chest that appears approximately 1 week ago. States started with one spot on her stomach and little areas keep popping up on her trunk and chest. Rash is itchy. Not present on face, arms or legs. Denies any changes in medications, hygiene products, or detergents.   Patient's PMR from outside medical facility reviewed and noted.    Review of Systems   Constitutional: Negative for activity change, fatigue and unexpected weight change.   HENT: Negative for congestion, mouth sores and trouble swallowing.    Eyes: Negative for discharge and visual disturbance.   Respiratory: Negative for cough and shortness of breath.    Cardiovascular: Negative for chest pain and leg swelling.   Gastrointestinal: Negative for abdominal pain, constipation, diarrhea and nausea.   Genitourinary: Negative for decreased urine volume, difficulty urinating and hematuria.   Musculoskeletal: Negative for back pain and gait problem.   Skin: Positive for rash. Negative for color change.   Allergic/Immunologic: Negative for environmental allergies and immunocompromised state.   Neurological: Negative for weakness and headaches.   Psychiatric/Behavioral: Negative for confusion and sleep disturbance.        Otherwise complete ROS reviewed and negative except as mentioned in the HPI.    Past Medical History:   Past Medical History:   Diagnosis Date   • Anemia    • Chronic kidney disease     stones   • Depression 2022   • Kidney stone     [facility] in 2016   • Migraine     Stress related   • Ovarian cyst      Past Surgical History:  Past Surgical History:   Procedure Laterality Date   •  SECTION N/A 10/02/2017    Procedure:  SECTION PRIMARY;  Surgeon: Cecily Riggs MD;  Location: [facility] LABOR DELIVERY;  Service:    •   SECTION N/A 2020    Procedure: REPEAT  SECTION WITHOUT TUBAL LIGATION;  Surgeon: Cecily Riggs MD;  Location: [facility] LABOR DELIVERY;  Service: Obstetrics/Gynecology;  Laterality: N/A;   • CLEFT LIP REPAIR     • CLEFT PALATE REPAIR      8 surgeries   • COSMETIC SURGERY      Cleft lip repair [id number]   • HAND OPEN REDUCTION INTERNAL FIXATION     • KNEE ARTHROSCOPY MENISCUS TRANSPLANT Right    • KNEE SURGERY      piece of femur broke off into knee     Social History:  reports that she has never smoked. She has never used smokeless tobacco. She reports that she does not drink alcohol and does not use drugs.    Family History: family history includes Alcohol abuse in her father; Birth defects in her father; Breast cancer in her paternal grandmother; Cancer in her maternal grandfather and paternal grandmother; Depression in her mother; Hypertension in her father; Melanoma in her paternal grandmother; Prostate cancer in her paternal grandfather; Thyroid disease in her mother.      Allergies:  Allergies   Allergen Reactions   • Amoxicillin      reaction as a baby, pt told by mother not to take amoxicillin.   • Imitrex [Sumatriptan] Nausea And Vomiting and Nausea Only   • Penicillins Other (See Comments)     Makes her crazy - mean.      Medications:  Prior to Admission medications    Medication Sig Start Date End Date Taking? Authorizing Provider   sertraline (ZOLOFT) 50 MG tablet Take 50 mg by mouth Daily. 22  Yes ProviderAlberto MD   ferrous sulfate 325 (65 FE) MG tablet Take 325 mg by mouth 3 (Three) Times a Day With Meals.    Provider, MD Alberto   HYDROcodone-acetaminophen (Norco) 5-325 MG per tablet Take 1 tablet by mouth Every 6 (Six) Hours As Needed for Severe Pain . 22   Cesilia Benítez APRN   ibuprofen (ADVIL,MOTRIN) 800 MG tablet Take 1 tablet by mouth Every 8 (Eight) Hours As Needed for Mild Pain  or Moderate Pain  (DO NOT START UNTIL COMPLETION OF  "TORADOL). 1/1/21   Cecily Riggs MD   metroNIDAZOLE (Flagyl) 500 MG tablet Take 1 tablet by mouth 3 (Three) Times a Day. 5/31/22   Cesilia Benítez APRN           Objective     Vital Signs: /62 (BP Location: Left arm, Patient Position: Sitting, Cuff Size: Adult)   Pulse 77   Temp 98 °F (36.7 °C) (Skin)   Resp 18   Ht 170.2 cm (67\")   Wt 91.6 kg (202 lb)   SpO2 99%   BMI 31.64 kg/m²   Physical Exam  Constitutional:       General: She is not in acute distress.     Appearance: Normal appearance. She is not ill-appearing.   HENT:      Head: Normocephalic and atraumatic.      Right Ear: External ear normal.      Left Ear: External ear normal.      Nose: Nose normal.      Mouth/Throat:      Mouth: Mucous membranes are moist.      Pharynx: No posterior oropharyngeal erythema.   Eyes:      General: No scleral icterus.     Extraocular Movements: Extraocular movements intact.      Conjunctiva/sclera: Conjunctivae normal.      Pupils: Pupils are equal, round, and reactive to light.   Cardiovascular:      Rate and Rhythm: Normal rate and regular rhythm.      Pulses: Normal pulses.      Heart sounds: Normal heart sounds.   Pulmonary:      Effort: Pulmonary effort is normal. No respiratory distress.      Breath sounds: Normal breath sounds. No wheezing.   Abdominal:      General: Abdomen is flat. Bowel sounds are normal.      Palpations: Abdomen is soft.      Tenderness: There is no abdominal tenderness.   Musculoskeletal:         General: Normal range of motion.      Cervical back: Normal range of motion.      Right lower leg: No edema.      Left lower leg: No edema.   Skin:     General: Skin is warm and dry.      Findings: Rash present. No erythema.      Comments: Scattered raised erythremic bumps present on anterior portion of trunk, and chest.    Neurological:      General: No focal deficit present.      Mental Status: She is alert and oriented to person, place, and time. Mental status is at " baseline.      Motor: No weakness.   Psychiatric:         Mood and Affect: Mood normal.         Behavior: Behavior normal.         Thought Content: Thought content normal.         Judgment: Judgment normal.           Results Reviewed:  ALT (SGPT)   Date Value Ref Range Status   12/21/2020 13 1 - 33 U/L Final     AST (SGOT)   Date Value Ref Range Status   12/21/2020 25 1 - 32 U/L Final     WBC   Date Value Ref Range Status   12/31/2020 9.90 3.40 - 10.80 10*3/mm3 Final     Hematocrit   Date Value Ref Range Status   12/31/2020 31.5 (L) 34.0 - 46.6 % Final     Platelets   Date Value Ref Range Status   12/31/2020 190 140 - 450 10*3/mm3 Final         Assessment / Plan     Assessment/Plan:  1. Rash  - triamcinolone acetonide (KENALOG-40) injection 40 mg  - triamcinolone (KENALOG) 0.1 % ointment; Apply 1 application topically to the appropriate area as directed 2 (Two) Times a Day.  Dispense: 30 g; Refill: 0      Return if symptoms worsen or fail to improve. unless patient needs to be seen sooner or acute issues arise.      I have discussed the patient results/orders and and plan/recommendation with them at today's visit.      Gemini Boo, YULIANA   07/29/2022

## 2022-08-01 ENCOUNTER — OFFICE VISIT (OUTPATIENT)
Dept: INTERNAL MEDICINE | Facility: CLINIC | Age: 32
End: 2022-08-01

## 2022-08-01 VITALS
RESPIRATION RATE: 18 BRPM | HEIGHT: 67 IN | SYSTOLIC BLOOD PRESSURE: 124 MMHG | HEART RATE: 71 BPM | DIASTOLIC BLOOD PRESSURE: 82 MMHG | WEIGHT: 202 LBS | TEMPERATURE: 98.5 F | BODY MASS INDEX: 31.71 KG/M2 | OXYGEN SATURATION: 99 %

## 2022-08-01 DIAGNOSIS — R21 RASH AND NONSPECIFIC SKIN ERUPTION: Primary | ICD-10-CM

## 2022-08-01 PROCEDURE — 99213 OFFICE O/P EST LOW 20 MIN: CPT | Performed by: NURSE PRACTITIONER

## 2022-08-01 RX ORDER — FLUCONAZOLE 200 MG/1
200 TABLET ORAL DAILY
Qty: 7 TABLET | Refills: 0 | Status: SHIPPED | OUTPATIENT
Start: 2022-08-01 | End: 2022-11-29 | Stop reason: ALTCHOICE

## 2022-08-01 NOTE — PROGRESS NOTES
Subjective     Chief Complaint   Patient presents with   • Rash       History of Present Illness     The patient complains of a rash that is worsening. She was seen on 2022, and she was given a Kenalog injection as well as Kenalog cream. She reports that she has lesions from the size of a pinhead, to the size of a quarter. She notes that the rash started with a few spots on the side of her abdomen and her chest. She adds that the rash has spread to her neck, bilateral breasts, bilateral legs, and her middle back. She denies any pain. She has not started any new medications or used new detergents. Her rash has been present for 1.5 weeks, and no one else in her family has contracted it.     Patient's PMR from outside medical facility reviewed and noted.    Review of Systems   Otherwise complete ROS reviewed and negative except as mentioned in the HPI.    Past Medical History:   Past Medical History:   Diagnosis Date   • Anemia    • Chronic kidney disease     stones   • Depression 2022   • Kidney stone     [facility] in 2016   • Migraine     Stress related   • Ovarian cyst      Past Surgical History:  Past Surgical History:   Procedure Laterality Date   •  SECTION N/A 10/02/2017    Procedure:  SECTION PRIMARY;  Surgeon: Cecily Riggs MD;  Location: [facility] LABOR DELIVERY;  Service:    •  SECTION N/A 2020    Procedure: REPEAT  SECTION WITHOUT TUBAL LIGATION;  Surgeon: Cecily Riggs MD;  Location: [facility] LABOR DELIVERY;  Service: Obstetrics/Gynecology;  Laterality: N/A;   • CLEFT LIP REPAIR     • CLEFT PALATE REPAIR      8 surgeries   • COSMETIC SURGERY      Cleft lip repair [id number]   • HAND OPEN REDUCTION INTERNAL FIXATION     • KNEE ARTHROSCOPY MENISCUS TRANSPLANT Right    • KNEE SURGERY      piece of femur broke off into knee     Social History:  reports that she has never smoked. She has never used smokeless tobacco. She reports that she does not  "drink alcohol and does not use drugs.    Family History: family history includes Alcohol abuse in her father; Birth defects in her father; Breast cancer in her paternal grandmother; Cancer in her maternal grandfather and paternal grandmother; Depression in her mother; Hypertension in her father; Melanoma in her paternal grandmother; Prostate cancer in her paternal grandfather; Thyroid disease in her mother.      Allergies:  Allergies   Allergen Reactions   • Amoxicillin      reaction as a baby, pt told by mother not to take amoxicillin.   • Imitrex [Sumatriptan] Nausea And Vomiting and Nausea Only   • Penicillins Other (See Comments)     Makes her crazy - mean.      Medications:  Prior to Admission medications    Medication Sig Start Date End Date Taking? Authorizing Provider   butenafine (Lotrimin Ultra) 1 % cream Apply 1 application topically to the appropriate area as directed 2 (Two) Times a Day. 8/1/22   Cesilia Benítez APRN   fluconazole (Diflucan) 200 MG tablet Take 1 tablet by mouth Daily. 8/1/22   Cesilia Benítez APRN   sertraline (ZOLOFT) 50 MG tablet Take 50 mg by mouth Daily. 5/26/22   Provider, MD Alberto   triamcinolone (KENALOG) 0.1 % ointment Apply 1 application topically to the appropriate area as directed 2 (Two) Times a Day. 7/29/22   Gemini Boo APRN                Objective     Vital Signs: /82   Pulse 71   Temp 98.5 °F (36.9 °C)   Resp 18   Ht 170.2 cm (67\")   Wt 91.6 kg (202 lb)   SpO2 99%   BMI 31.64 kg/m²   Physical Exam  Constitutional:       Appearance: She is well-developed.   HENT:      Head: Normocephalic and atraumatic.   Eyes:      Conjunctiva/sclera: Conjunctivae normal.      Pupils: Pupils are equal, round, and reactive to light.   Neck:      Vascular: No JVD.   Cardiovascular:      Rate and Rhythm: Normal rate and regular rhythm.      Heart sounds: Normal heart sounds. No murmur heard.    No friction rub. No gallop.   Pulmonary:      Effort: " Pulmonary effort is normal. No respiratory distress.      Breath sounds: Normal breath sounds. No wheezing or rales.   Chest:      Chest wall: No tenderness.   Abdominal:      General: Bowel sounds are normal. There is no distension.      Palpations: Abdomen is soft.      Tenderness: There is no abdominal tenderness. There is no guarding or rebound.   Musculoskeletal:         General: No tenderness or deformity. Normal range of motion.      Cervical back: Neck supple.   Skin:     General: Skin is warm and dry.      Findings: Rash present.      Comments: Irregular scaly, mildly pruritic and blanchable circumferential lesions that are irregularly shaped. Some are completely rounded located on the neck, anterior chest, mid-back, bilateral arms, left leg, bilateral breast, and abdomen.    Neurological:      Mental Status: She is alert and oriented to person, place, and time.      Cranial Nerves: No cranial nerve deficit.      Motor: No abnormal muscle tone.      Deep Tendon Reflexes: Reflexes normal.   Psychiatric:         Behavior: Behavior normal.         Thought Content: Thought content normal.         Judgment: Judgment normal.         BMI is >= 30 and <35. (Class 1 Obesity). The following options were offered after discussion;: weight loss educational material (shared in after visit summary)      Results Reviewed:  ALT (SGPT)   Date Value Ref Range Status   12/21/2020 13 1 - 33 U/L Final     AST (SGOT)   Date Value Ref Range Status   12/21/2020 25 1 - 32 U/L Final     WBC   Date Value Ref Range Status   12/31/2020 9.90 3.40 - 10.80 10*3/mm3 Final     Hematocrit   Date Value Ref Range Status   12/31/2020 31.5 (L) 34.0 - 46.6 % Final     Platelets   Date Value Ref Range Status   12/31/2020 190 140 - 450 10*3/mm3 Final         Assessment / Plan     Assessment/Plan:  1. Rash and nonspecific skin eruption  - fluconazole (Diflucan) 200 MG tablet; Take 1 tablet by mouth Daily.  Dispense: 7 tablet; Refill: 0  - butenafine  (Lotrimin Ultra) 1 % cream; Apply 1 application topically to the appropriate area as directed 2 (Two) Times a Day.  Dispense: 15 g; Refill: 0    I was able to obtain a skin scraping. We placed this between 2 glass slides which will be sent to pathology for evaluation. I prescribed Lotrimin cream as well as some oral Diflucan. I will re-evaluate these lesions on Friday, 08/05/2022. If she has problems, she will let me know.       Return in about 4 days (around 8/5/2022). unless patient needs to be seen sooner or acute issues arise.      I have discussed the patient results/orders and and plan/recommendation with them at today's visit.      Transcribed from ambient dictation for YULIANA Wills by Holley Daniels.  08/01/22   17:33 CDT    Patient verbalized consent to the visit recording.  I have personally performed the services described in this document as transcribed by the above individual, and it is both accurate and complete.  YULIANA Wills  8/2/2022  09:51 CDT

## 2022-08-05 ENCOUNTER — OFFICE VISIT (OUTPATIENT)
Dept: INTERNAL MEDICINE | Facility: CLINIC | Age: 32
End: 2022-08-05

## 2022-08-05 ENCOUNTER — TELEPHONE (OUTPATIENT)
Dept: INTERNAL MEDICINE | Facility: CLINIC | Age: 32
End: 2022-08-05

## 2022-08-05 VITALS
DIASTOLIC BLOOD PRESSURE: 72 MMHG | BODY MASS INDEX: 31.23 KG/M2 | SYSTOLIC BLOOD PRESSURE: 110 MMHG | RESPIRATION RATE: 16 BRPM | OXYGEN SATURATION: 100 % | HEART RATE: 71 BPM | TEMPERATURE: 98 F | HEIGHT: 67 IN | WEIGHT: 199 LBS

## 2022-08-05 DIAGNOSIS — Z32.02 URINE PREGNANCY TEST NEGATIVE: ICD-10-CM

## 2022-08-05 DIAGNOSIS — L50.9 URTICARIA: ICD-10-CM

## 2022-08-05 DIAGNOSIS — R21 RASH AND NONSPECIFIC SKIN ERUPTION: Primary | ICD-10-CM

## 2022-08-05 LAB
B-HCG UR QL: NEGATIVE
EXPIRATION DATE: NORMAL
INTERNAL NEGATIVE CONTROL: NEGATIVE
INTERNAL POSITIVE CONTROL: POSITIVE
Lab: NORMAL

## 2022-08-05 PROCEDURE — 81025 URINE PREGNANCY TEST: CPT | Performed by: NURSE PRACTITIONER

## 2022-08-05 PROCEDURE — 99213 OFFICE O/P EST LOW 20 MIN: CPT | Performed by: NURSE PRACTITIONER

## 2022-08-05 RX ORDER — HYDROXYZINE HYDROCHLORIDE 10 MG/1
10 TABLET, FILM COATED ORAL 3 TIMES DAILY PRN
Qty: 90 TABLET | Refills: 0 | Status: SHIPPED | OUTPATIENT
Start: 2022-08-05 | End: 2022-11-29 | Stop reason: ALTCHOICE

## 2022-08-05 RX ORDER — PREDNISONE 20 MG/1
TABLET ORAL
Qty: 10 TABLET | Refills: 0 | Status: SHIPPED | OUTPATIENT
Start: 2022-08-05 | End: 2022-11-29 | Stop reason: ALTCHOICE

## 2022-08-05 NOTE — TELEPHONE ENCOUNTER
Caller: Adina Harris    Relationship: Self    Best call back number: [phone]    What is the best time to reach you: ANYTIME    Who are you requesting to speak with (clinical staff, provider,  specific staff member): CLINICAL      What was the call regarding: WAS GIVEN NEW MEDICATION TODAY. DOES SHE STOP TAKING THE OLD MEDICATION    Do you require a callback: YES

## 2022-08-05 NOTE — PROGRESS NOTES
Answers for HPI/ROS submitted by the patient on 2022  What is the primary reason for your visit?: Rash  Chronicity: new  Onset: 1 to 4 weeks ago  Progression since onset: rapidly worsening  Affected locations: neck, chest, torso, back, abdomen, left hip, left ankle, right axilla, right hip, right upper leg  Characteristics: itchiness, scaling  Exposed to: nothing  anorexia: No  congestion: No  cough: No  diarrhea: No  eye pain: No  facial edema: No  fatigue: No  fever: No  joint pain: No  nail changes: No  rhinorrhea: No  shortness of breath: No  sore throat: No  vomiting: No            Subjective     Chief Complaint   Patient presents with   • Rash     Follow up       History of Present Illness  Patient comes in today to follow-up on her rash.  She was been treated with injectable corticosteroids followed by triamcinolone ointment and it did not improve and actually spread I placed her on Diflucan and Lotrimin cream.  Despite this, she has continued to have additional lesions.  She has significant more itching up on her neck and back.  No fevers or chills.  She has not had any changes in her diet no recent medications no recent illness no changes in detergents or soaps. She actually did take Benadryl last night. Skin scraping taken on Monday has not resulted.     Review of Systems   Otherwise complete ROS reviewed.    Past Medical History:   Past Medical History:   Diagnosis Date   • Anemia    • Chronic kidney disease     stones   • Depression 2022   • Kidney stone     [facility] in 2016   • Migraine     Stress related   • Ovarian cyst      Past Surgical History:  Past Surgical History:   Procedure Laterality Date   •  SECTION N/A 10/02/2017    Procedure:  SECTION PRIMARY;  Surgeon: Cecily Riggs MD;  Location: [facility] LABOR DELIVERY;  Service:    •  SECTION N/A 2020    Procedure: REPEAT  SECTION WITHOUT TUBAL LIGATION;  Surgeon: Cecily Riggs MD;  Location:  [facility] LABOR DELIVERY;  Service: Obstetrics/Gynecology;  Laterality: N/A;   • CLEFT LIP REPAIR     • CLEFT PALATE REPAIR      8 surgeries   • COSMETIC SURGERY      Cleft lip repair [id number]   • HAND OPEN REDUCTION INTERNAL FIXATION     • KNEE ARTHROSCOPY MENISCUS TRANSPLANT Right    • KNEE SURGERY      piece of femur broke off into knee     Social History:  reports that she has never smoked. She has never used smokeless tobacco. She reports that she does not drink alcohol and does not use drugs.    Family History: family history includes Alcohol abuse in her father; Birth defects in her father; Breast cancer in her paternal grandmother; Cancer in her maternal grandfather and paternal grandmother; Depression in her mother; Hypertension in her father; Melanoma in her paternal grandmother; Prostate cancer in her paternal grandfather; Thyroid disease in her mother.      Allergies:  Allergies   Allergen Reactions   • Amoxicillin      reaction as a baby, pt told by mother not to take amoxicillin.   • Imitrex [Sumatriptan] Nausea And Vomiting and Nausea Only   • Penicillins Other (See Comments)     Makes her crazy - mean.      Medications:  Prior to Admission medications    Medication Sig Start Date End Date Taking? Authorizing Provider   butenafine (Lotrimin Ultra) 1 % cream Apply 1 application topically to the appropriate area as directed 2 (Two) Times a Day. 8/1/22  Yes Cesilia Benítez APRN   fluconazole (Diflucan) 200 MG tablet Take 1 tablet by mouth Daily. 8/1/22  Yes Cesilia Benítez APRN   sertraline (ZOLOFT) 50 MG tablet Take 50 mg by mouth Daily. 5/26/22  Yes Provider, MD Alberto   triamcinolone (KENALOG) 0.1 % ointment Apply 1 application topically to the appropriate area as directed 2 (Two) Times a Day. 7/29/22  Yes Gemini Boo APRN   hydrOXYzine (ATARAX) 10 MG tablet Take 1 tablet by mouth 3 (Three) Times a Day As Needed for Itching. 8/5/22   Cesilia Benítez APRN  "  predniSONE (DELTASONE) 20 MG tablet Take 2 pills orally daily for 2 days then 1 daily for 3 days then 1/2 tablet daily for 3 days then stop. 8/5/22   Cesilia Benítez APRN       Objective     Vital Signs: /72 (BP Location: Right arm, Patient Position: Sitting, Cuff Size: Large Adult)   Pulse 71   Temp 98 °F (36.7 °C) (Infrared)   Resp 16   Ht 170.2 cm (67.01\")   Wt 90.3 kg (199 lb)   SpO2 100%   BMI 31.16 kg/m²   Physical Exam  Vitals reviewed.   Constitutional:       Appearance: She is well-developed.   HENT:      Head: Normocephalic and atraumatic.   Eyes:      Pupils: Pupils are equal, round, and reactive to light.   Neck:      Vascular: No JVD.   Cardiovascular:      Rate and Rhythm: Normal rate and regular rhythm.   Pulmonary:      Effort: Pulmonary effort is normal.   Abdominal:      General: Bowel sounds are normal.      Palpations: Abdomen is soft.   Musculoskeletal:         General: No deformity.      Cervical back: Normal range of motion and neck supple.   Lymphadenopathy:      Cervical: No cervical adenopathy.   Skin:     General: Skin is warm and dry.      Findings: Rash ( see picture below. irregularly oval shaped macule. ) present.   Neurological:      Mental Status: She is alert and oriented to person, place, and time.   Psychiatric:         Behavior: Behavior normal.         Thought Content: Thought content normal.         Judgment: Judgment normal.             Results Reviewed:  ALT (SGPT)   Date Value Ref Range Status   12/21/2020 13 1 - 33 U/L Final     AST (SGOT)   Date Value Ref Range Status   12/21/2020 25 1 - 32 U/L Final     WBC   Date Value Ref Range Status   12/31/2020 9.90 3.40 - 10.80 10*3/mm3 Final     Hematocrit   Date Value Ref Range Status   12/31/2020 31.5 (L) 34.0 - 46.6 % Final     Platelets   Date Value Ref Range Status   12/31/2020 190 140 - 450 10*3/mm3 Final         Assessment / Plan     Assessment/Plan:  Diagnoses and all orders for this visit:    1. " Rash and nonspecific skin eruption (Primary)  -     CBC & Differential  -     Comprehensive Metabolic Panel  -     IgA  -     AGUSTINA by IFA, Reflex 9-biomarkers profile  -     C-reactive Protein  -     Sedimentation rate, automated  -     predniSONE (DELTASONE) 20 MG tablet; Take 2 pills orally daily for 2 days then 1 daily for 3 days then 1/2 tablet daily for 3 days then stop.  Dispense: 10 tablet; Refill: 0    2. Urticaria  -     CBC & Differential  -     Comprehensive Metabolic Panel  -     IgA  -     AGUSTINA by IFA, Reflex 9-biomarkers profile  -     C-reactive Protein  -     Sedimentation rate, automated  -     hydrOXYzine (ATARAX) 10 MG tablet; Take 1 tablet by mouth 3 (Three) Times a Day As Needed for Itching.  Dispense: 90 tablet; Refill: 0  -     predniSONE (DELTASONE) 20 MG tablet; Take 2 pills orally daily for 2 days then 1 daily for 3 days then 1/2 tablet daily for 3 days then stop.  Dispense: 10 tablet; Refill: 0    3. Urine pregnancy test negative  -     POCT pregnancy, urine      We did a pregnancy test just to assure that was negative prior to putting her on additional medications.  I will see her back on Tuesday hopefully the pathology from the skin scrapings will be back otherwise she will need to be referred to dermatology    No follow-ups on file. unless patient needs to be seen sooner or acute issues arise.    Code Status: Full    I have discussed the patient results/orders and and plan/recommendation with them at today's visit.      Cesilia Benítez, YULIANA   08/05/2022

## 2022-08-08 LAB
ALBUMIN SERPL-MCNC: 4.8 G/DL (ref 3.9–5)
ALBUMIN/GLOB SERPL: 1.7 {RATIO} (ref 1.2–2.2)
ALP SERPL-CCNC: 65 IU/L (ref 44–121)
ALT SERPL-CCNC: 11 IU/L (ref 0–32)
ANA SER QL IF: POSITIVE
ANA SPECKLED TITR SER: ABNORMAL {TITER}
AST SERPL-CCNC: 15 IU/L (ref 0–40)
BASOPHILS # BLD AUTO: 0 X10E3/UL (ref 0–0.2)
BASOPHILS NFR BLD AUTO: 0 %
BILIRUB SERPL-MCNC: 0.4 MG/DL (ref 0–1.2)
BUN SERPL-MCNC: 15 MG/DL (ref 6–20)
BUN/CREAT SERPL: 16 (ref 9–23)
CALCIUM SERPL-MCNC: 9.7 MG/DL (ref 8.7–10.2)
CENTROMERE B AB SER-ACNC: <0.2 AI (ref 0–0.9)
CHLORIDE SERPL-SCNC: 101 MMOL/L (ref 96–106)
CHROMATIN AB SERPL-ACNC: <0.2 AI (ref 0–0.9)
CO2 SERPL-SCNC: 21 MMOL/L (ref 20–29)
CREAT SERPL-MCNC: 0.93 MG/DL (ref 0.57–1)
CRP SERPL-MCNC: 4 MG/L (ref 0–10)
DSDNA AB SER-ACNC: 1 IU/ML (ref 0–9)
EGFRCR SERPLBLD CKD-EPI 2021: 85 ML/MIN/1.73
ENA JO1 AB SER-ACNC: <0.2 AI (ref 0–0.9)
ENA RNP AB SER-ACNC: <0.2 AI (ref 0–0.9)
ENA SCL70 AB SER-ACNC: <0.2 AI (ref 0–0.9)
ENA SM AB SER-ACNC: <0.2 AI (ref 0–0.9)
ENA SS-A AB SER-ACNC: <0.2 AI (ref 0–0.9)
ENA SS-B AB SER-ACNC: 0.3 AI (ref 0–0.9)
EOSINOPHIL # BLD AUTO: 0.1 X10E3/UL (ref 0–0.4)
EOSINOPHIL NFR BLD AUTO: 2 %
ERYTHROCYTE [DISTWIDTH] IN BLOOD BY AUTOMATED COUNT: 13.6 % (ref 11.7–15.4)
ERYTHROCYTE [SEDIMENTATION RATE] IN BLOOD BY WESTERGREN METHOD: 12 MM/HR (ref 0–32)
GLOBULIN SER CALC-MCNC: 2.9 G/DL (ref 1.5–4.5)
GLUCOSE SERPL-MCNC: 87 MG/DL (ref 65–99)
HCT VFR BLD AUTO: 40.7 % (ref 34–46.6)
HGB BLD-MCNC: 13.5 G/DL (ref 11.1–15.9)
IGA SERPL-MCNC: 288 MG/DL (ref 87–352)
IMM GRANULOCYTES # BLD AUTO: 0 X10E3/UL (ref 0–0.1)
IMM GRANULOCYTES NFR BLD AUTO: 0 %
LABORATORY COMMENT REPORT: ABNORMAL
LYMPHOCYTES # BLD AUTO: 2 X10E3/UL (ref 0.7–3.1)
LYMPHOCYTES NFR BLD AUTO: 35 %
Lab: ABNORMAL
Lab: ABNORMAL
MCH RBC QN AUTO: 28.9 PG (ref 26.6–33)
MCHC RBC AUTO-ENTMCNC: 33.2 G/DL (ref 31.5–35.7)
MCV RBC AUTO: 87 FL (ref 79–97)
MONOCYTES # BLD AUTO: 0.2 X10E3/UL (ref 0.1–0.9)
MONOCYTES NFR BLD AUTO: 4 %
NEUTROPHILS # BLD AUTO: 3.4 X10E3/UL (ref 1.4–7)
NEUTROPHILS NFR BLD AUTO: 59 %
PLATELET # BLD AUTO: 260 X10E3/UL (ref 150–450)
POTASSIUM SERPL-SCNC: 4.3 MMOL/L (ref 3.5–5.2)
PROT SERPL-MCNC: 7.7 G/DL (ref 6–8.5)
RBC # BLD AUTO: 4.67 X10E6/UL (ref 3.77–5.28)
SODIUM SERPL-SCNC: 138 MMOL/L (ref 134–144)
WBC # BLD AUTO: 5.8 X10E3/UL (ref 3.4–10.8)

## 2022-08-09 ENCOUNTER — OFFICE VISIT (OUTPATIENT)
Dept: INTERNAL MEDICINE | Facility: CLINIC | Age: 32
End: 2022-08-09

## 2022-08-09 VITALS
DIASTOLIC BLOOD PRESSURE: 71 MMHG | TEMPERATURE: 97.5 F | HEART RATE: 58 BPM | OXYGEN SATURATION: 100 % | HEIGHT: 67 IN | BODY MASS INDEX: 30.92 KG/M2 | RESPIRATION RATE: 17 BRPM | WEIGHT: 197 LBS | SYSTOLIC BLOOD PRESSURE: 104 MMHG

## 2022-08-09 DIAGNOSIS — R21 RASH AND NONSPECIFIC SKIN ERUPTION: Primary | ICD-10-CM

## 2022-08-09 PROCEDURE — 99213 OFFICE O/P EST LOW 20 MIN: CPT | Performed by: NURSE PRACTITIONER

## 2022-08-09 NOTE — PROGRESS NOTES
Answers for HPI/ROS submitted by the patient on 2022  What is the primary reason for your visit?: Rash  Chronicity: new  Onset: 1 to 4 weeks ago  Progression since onset: gradually improving  Affected locations: neck, chest, back, abdomen  Characteristics: redness, itchiness, scaling  Exposed to: nothing  anorexia: No  congestion: No  cough: No  diarrhea: No  eye pain: No  facial edema: No  fatigue: No  fever: No  joint pain: No  nail changes: No  rhinorrhea: No  shortness of breath: No  sore throat: No  vomiting: No            Subjective     Chief Complaint   Patient presents with   • Rash       History of Present Illness  Patient comes in today to follow-up on her rash.  This will be her third office visit for this rash.  I gave her higher doses of steroids last week.  She comes in today to follow-up stating that the rash is better.  Much less itchy she still has some lesions but they are drying nicely.  She is doing better on 20 mg of prednisone than she was on 40.    Review of Systems   Otherwise complete ROS reviewed and negative except as mentioned in the HPI.    Past Medical History:   Past Medical History:   Diagnosis Date   • Anemia    • Chronic kidney disease     stones   • Depression 2022   • Kidney stone     [facility] in    • Migraine     Stress related   • Ovarian cyst      Past Surgical History:  Past Surgical History:   Procedure Laterality Date   •  SECTION N/A 10/02/2017    Procedure:  SECTION PRIMARY;  Surgeon: Cecily Riggs MD;  Location: [facility] LABOR DELIVERY;  Service:    •  SECTION N/A 2020    Procedure: REPEAT  SECTION WITHOUT TUBAL LIGATION;  Surgeon: Cecily Riggs MD;  Location: [facility] LABOR DELIVERY;  Service: Obstetrics/Gynecology;  Laterality: N/A;   • CLEFT LIP REPAIR     • CLEFT PALATE REPAIR      8 surgeries   • COSMETIC SURGERY      Cleft lip repair [id number]   • HAND OPEN REDUCTION INTERNAL FIXATION     • KNEE  ARTHROSCOPY MENISCUS TRANSPLANT Right    • KNEE SURGERY      piece of femur broke off into knee     Social History:  reports that she has never smoked. She has never used smokeless tobacco. She reports that she does not drink alcohol and does not use drugs.    Family History: family history includes Alcohol abuse in her father; Birth defects in her father; Breast cancer in her paternal grandmother; Cancer in her maternal grandfather and paternal grandmother; Depression in her mother; Hypertension in her father; Melanoma in her paternal grandmother; Prostate cancer in her paternal grandfather; Thyroid disease in her mother.       Allergies:  Allergies   Allergen Reactions   • Amoxicillin      reaction as a baby, pt told by mother not to take amoxicillin.   • Imitrex [Sumatriptan] Nausea And Vomiting and Nausea Only   • Penicillins Other (See Comments)     Makes her crazy - mean.      Medications:  Prior to Admission medications    Medication Sig Start Date End Date Taking? Authorizing Provider   butenafine (Lotrimin Ultra) 1 % cream Apply 1 application topically to the appropriate area as directed 2 (Two) Times a Day. 8/1/22   Cesilia Benítez APRN   fluconazole (Diflucan) 200 MG tablet Take 1 tablet by mouth Daily. 8/1/22   Cesilia Benítez APRN   hydrOXYzine (ATARAX) 10 MG tablet Take 1 tablet by mouth 3 (Three) Times a Day As Needed for Itching. 8/5/22   Cesilia Benítez APRN   predniSONE (DELTASONE) 20 MG tablet Take 2 pills orally daily for 2 days then 1 daily for 3 days then 1/2 tablet daily for 3 days then stop. 8/5/22   Cesilia Benítez APRN   sertraline (ZOLOFT) 50 MG tablet Take 50 mg by mouth Daily. 5/26/22   Provider, MD Alberto   triamcinolone (KENALOG) 0.1 % ointment Apply 1 application topically to the appropriate area as directed 2 (Two) Times a Day. 7/29/22   Gemini Boo APRN       Objective     Vital Signs: /71   Pulse 58   Temp 97.5 °F (36.4 °C)    "Resp 17   Ht 170.2 cm (67.01\")   Wt 89.4 kg (197 lb)   SpO2 100%   BMI 30.85 kg/m²   Physical Exam  Vitals reviewed.   Constitutional:       Appearance: She is well-developed.   HENT:      Head: Normocephalic and atraumatic.   Eyes:      Pupils: Pupils are equal, round, and reactive to light.   Neck:      Vascular: No JVD.   Cardiovascular:      Rate and Rhythm: Normal rate and regular rhythm.   Pulmonary:      Effort: Pulmonary effort is normal.   Abdominal:      General: Bowel sounds are normal.      Palpations: Abdomen is soft.   Musculoskeletal:         General: No deformity.      Cervical back: Normal range of motion and neck supple.   Lymphadenopathy:      Cervical: No cervical adenopathy.   Skin:     General: Skin is warm and dry.      Findings: Rash present. Erythema:  Fading plaque-like rash.   Neurological:      Mental Status: She is alert and oriented to person, place, and time.   Psychiatric:         Behavior: Behavior normal.         Thought Content: Thought content normal.         Judgment: Judgment normal.       Results Reviewed:  Glucose   Date Value Ref Range Status   08/05/2022 87 65 - 99 mg/dL Final     BUN   Date Value Ref Range Status   08/05/2022 15 6 - 20 mg/dL Final     Creatinine   Date Value Ref Range Status   08/05/2022 0.93 0.57 - 1.00 mg/dL Final     Sodium   Date Value Ref Range Status   08/05/2022 138 134 - 144 mmol/L Final     Potassium   Date Value Ref Range Status   08/05/2022 4.3 3.5 - 5.2 mmol/L Final     Chloride   Date Value Ref Range Status   08/05/2022 101 96 - 106 mmol/L Final     Total CO2   Date Value Ref Range Status   08/05/2022 21 20 - 29 mmol/L Final     Calcium   Date Value Ref Range Status   08/05/2022 9.7 8.7 - 10.2 mg/dL Final     ALT (SGPT)   Date Value Ref Range Status   08/05/2022 11 0 - 32 IU/L Final   12/21/2020 13 1 - 33 U/L Final     AST (SGOT)   Date Value Ref Range Status   08/05/2022 15 0 - 40 IU/L Final   12/21/2020 25 1 - 32 U/L Final     WBC   Date " Value Ref Range Status   08/05/2022 5.8 3.4 - 10.8 x10E3/uL Final     Hematocrit   Date Value Ref Range Status   08/05/2022 40.7 34.0 - 46.6 % Final   12/31/2020 31.5 (L) 34.0 - 46.6 % Final     Platelets   Date Value Ref Range Status   08/05/2022 260 150 - 450 x10E3/uL Final   12/31/2020 190 140 - 450 10*3/mm3 Final         Assessment / Plan     Assessment/Plan:  Diagnoses and all orders for this visit:    1. Rash and nonspecific skin eruption (Primary)     -He is improving with the higher doses of steroids.  I did tell her if it returns off steroids she needs to let me know and we will try to get her to dermatology ASAP   -It was noted that she had a positive AGUSTINA with negative reflex.  This was discussed with her and her inflammatory markers were normal as well.  We will continue to follow    No follow-ups on file. unless patient needs to be seen sooner or acute issues arise.    Code Status:Full.     I have discussed the patient results/orders and and plan/recommendation with them at today's visit.      Cesilia Benítez, APRN   08/09/2022

## 2022-08-31 LAB
FUNGUS SPEC CULT: NORMAL
FUNGUS SPEC FUNGUS STN: NORMAL

## 2022-11-22 ENCOUNTER — HOSPITAL ENCOUNTER (OUTPATIENT)
Dept: PREADMISSION TESTING | Age: 32
Discharge: HOME | End: 2022-11-26
Payer: COMMERCIAL

## 2022-11-22 VITALS — BODY MASS INDEX: 30.76 KG/M2 | WEIGHT: 196 LBS | HEIGHT: 67 IN

## 2022-11-22 LAB
BASOPHILS ABSOLUTE: 0 K/UL (ref 0–0.2)
BASOPHILS RELATIVE PERCENT: 0.8 % (ref 0–1)
EOSINOPHILS ABSOLUTE: 0.2 K/UL (ref 0–0.6)
EOSINOPHILS RELATIVE PERCENT: 2.9 % (ref 0–5)
HCT VFR BLD CALC: 45.7 % (ref 37–47)
HEMOGLOBIN: 14.5 G/DL (ref 12–16)
IMMATURE GRANULOCYTES #: 0 K/UL
LYMPHOCYTES ABSOLUTE: 1.6 K/UL (ref 1.1–4.5)
LYMPHOCYTES RELATIVE PERCENT: 30.6 % (ref 20–40)
MCH RBC QN AUTO: 29 PG (ref 27–31)
MCHC RBC AUTO-ENTMCNC: 31.7 G/DL (ref 33–37)
MCV RBC AUTO: 91.4 FL (ref 81–99)
MONOCYTES ABSOLUTE: 0.3 K/UL (ref 0–0.9)
MONOCYTES RELATIVE PERCENT: 5.8 % (ref 0–10)
NEUTROPHILS ABSOLUTE: 3.1 K/UL (ref 1.5–7.5)
NEUTROPHILS RELATIVE PERCENT: 59.7 % (ref 50–65)
PDW BLD-RTO: 12.6 % (ref 11.5–14.5)
PLATELET # BLD: 263 K/UL (ref 130–400)
PMV BLD AUTO: 10.7 FL (ref 9.4–12.3)
RBC # BLD: 5 M/UL (ref 4.2–5.4)
WBC # BLD: 5.2 K/UL (ref 4.8–10.8)

## 2022-11-22 PROCEDURE — 85025 COMPLETE CBC W/AUTO DIFF WBC: CPT

## 2022-11-29 ENCOUNTER — OFFICE VISIT (OUTPATIENT)
Dept: INTERNAL MEDICINE | Facility: CLINIC | Age: 32
End: 2022-11-29

## 2022-11-29 VITALS
TEMPERATURE: 98 F | BODY MASS INDEX: 30.92 KG/M2 | OXYGEN SATURATION: 98 % | WEIGHT: 197 LBS | HEART RATE: 93 BPM | HEIGHT: 67 IN | RESPIRATION RATE: 18 BRPM

## 2022-11-29 DIAGNOSIS — J10.1 INFLUENZA A: ICD-10-CM

## 2022-11-29 DIAGNOSIS — R05.9 COUGH, UNSPECIFIED TYPE: Primary | ICD-10-CM

## 2022-11-29 DIAGNOSIS — R50.9 FEVER, UNSPECIFIED FEVER CAUSE: ICD-10-CM

## 2022-11-29 LAB
EXPIRATION DATE: ABNORMAL
FLUAV AG UPPER RESP QL IA.RAPID: DETECTED
FLUBV AG UPPER RESP QL IA.RAPID: NOT DETECTED
INTERNAL CONTROL: ABNORMAL
Lab: ABNORMAL
SARS-COV-2 AG UPPER RESP QL IA.RAPID: NOT DETECTED

## 2022-11-29 PROCEDURE — 87428 SARSCOV & INF VIR A&B AG IA: CPT

## 2022-11-29 PROCEDURE — 99213 OFFICE O/P EST LOW 20 MIN: CPT

## 2022-11-29 RX ORDER — NORETHINDRONE ACETATE AND ETHINYL ESTRADIOL AND FERROUS FUMARATE 1MG-20(24)
1 KIT ORAL DAILY
Status: ON HOLD | COMMUNITY
End: 2022-12-12

## 2022-11-29 RX ORDER — OSELTAMIVIR PHOSPHATE 75 MG/1
75 CAPSULE ORAL 2 TIMES DAILY
Qty: 10 CAPSULE | Refills: 0 | Status: SHIPPED | OUTPATIENT
Start: 2022-11-29 | End: 2022-12-04

## 2022-11-29 NOTE — DISCHARGE INSTRUCTIONS
Lower Extremity Post-op Instructions  Dr. Zoie García        POST-OP CARE: Please follow these instructions closely! IMPORTANT PHONE NUMBERS:  For emergencies, please call 911  You may reach Dr. Cindy Gunderson or his medical assistants at [phone], M-F 8:00am-5:00pm  After 5pm or on the weekends, please call the answering service which can be reached from the number above   Call immediately if you have any of the following symptoms:  Elevated temperature above 101.5 degrees for more than 48 hours after surgery  Persistent drainage from wound  Severe pain around surgical site  Calf pain    Weight Bearing:   __x___ Weight Bearing as tolerated (with or without crutches)   _____ Touch-Toe Weight-bearing    _____ Partial Weight Bearing  ___ % for ___ weeks (must use crutches)   _____ Non Weight Bearing for ____ weeks (must use crutches, wheelchair or                          knee walker)    Bathing:  If stated below, it is ok to remove your postop dressing and shower. DO NOT SOAK the incision in water. Pat the incision site dry after surgery  _x__ You may remove your dressing and shower on the 3rd day following surgery; if you shower before this, please cover your incision thoroughly  ___Keep your splint/dressing clean, dry, intact. Do not place foreign objects inside the splint/dressing. Dressings: Do NOT remove dressing/splint unless unless told to do so. SOME DRAINAGE IS NORMAL! If you have a splint or cast, do NOT get wet!!    DO NOT touch, remove, or apply ointment to the incision and/or steri strips  Steri strips may fall off on their own  Signs of infection that warrant a phone call to our clinical line:  Excessive drainage or redness  Red streaking coming away from the incision  Increased pain  Increased temperature above 101.5 degrees    DRIVING:  absolutely no driving while taking pain medication. This will be discussed at your first postop visit.     Incision: do NOT uncover unless you are told it is ok (see above). Black sutures will need to be removed 10-14 days after surgery. Elevate: Place 2 pillows under your ankle to get the incision area above the level of the heart to help in swelling (a recliner is not elevated!!!)    Ice: Ice your surgery site 5-6 times per day for 20 minutes at a time with dressing in place. You should wait at least 30 minutes before icing again to avoid ice irritation. It may be difficult at first to ice the surgery area due to the amount of dressing, but continue to be diligent with icing. Your dressings will be taken down at your first post-op appointment. Range of motion: For the knee- It is important to gain gain full extension (knee straight) as soon as possible following surgery. Ice to decrease swelling --> Knee fully straight --> Walk without a limp!!!  NO PILLOWS UNDER THE KNEE, ONLY under the ankle  For the foot/ankle- range of motion restrictions will be given to you at your first post-op appointment. Until that time, avoid any unnecessary range of motion. Physical therapy- Your physical therapy status will be discussed with you at your first  post-op appointment. **Achieving range of motion goals and decreasing swelling/inflammation are the primary focus for the first two (2) weeks following surgery. **    Medications: You will be discharged with the appropriate medications following your surgery. Fill these at the pharmacy and take them as directed on the label. Possible medications that will be prescribed are below. You may or may not receive all of these. Occasionally, additional medications may be given with specific instructions. Percocet/Norco (oxycodone/hydrocodone with tylenol) - Pain Medication, will cause drowsiness, possibly itchiness (this is NOT an allergy - use benadryl or an over the counter allergy medication such as Claritin or Zyrtec)     o Take 1-2 tablets every 4-6 hours. DO NOT EXCEED 4,000mg of Tylenol in 24 hours.   **DO NOT MIX WITH ALCOHOL, DRIVE WHILE TAKING, OR TAKE with extra TYLENOL**     *  After the 2nd day of surgery, begin weaning pain medication (less pills, increased timeframe)     *  ALL narcotics will cause constipation - take a stool softener frequently. If you become constipated consider taking Milk of Magnesia as directed on the bottle. Colace (Docusate) - stool softener, used for constipation    Zofran (Ondansetron) or Phenergan - Anti-nausea medication, will cause drowsiness    Aspirin 81 mg - all patients with lower extremity surgery should take one aspirin                            81 mg for prevention of blood clots for the first 2 weeks after surgery. DO NOT TAKE NSAID'S (ex. IBUPROFEN, MOTRIN, ALEVE, ETC) AFTER A BROKEN BONE HAS BEEN REPAIRED OR AFTER ACL SURGERY - THESE MEDICATIONS WILL SLOW THE HEALING PROCESS!!!    **If you are running low on pain medications, please notify us if you need a refill 24-48 hours prior to when you run out, so we can make arrangements to refill the prescription for you if we determine is necessary. **[place] law prohibits \"calling in\" narcotic medication - you will be required to come by the office to  an additional prescription.

## 2022-11-29 NOTE — PROGRESS NOTES
Subjective     Chief Complaint   Patient presents with   • Cough     Symptoms started 2 days ago.    • Nasal Congestion   • Fever       History of Present Illness  Patient presents today with complaints of cough, nasal congestion, and fever for the last 2 days. Able to eat and drink ok. Cough is congested,is productive at times.  is sick with similar symptoms.   Patient's PMR from outside medical facility reviewed and noted.    Review of Systems   Constitutional: Positive for chills and fever.   HENT: Positive for ear pain, postnasal drip and rhinorrhea. Negative for sore throat.    Respiratory: Positive for cough. Negative for shortness of breath and wheezing.    Cardiovascular: Negative for chest pain.   Musculoskeletal: Positive for myalgias.   Skin: Negative for rash.   Neurological: Positive for headaches.        Otherwise complete ROS reviewed and negative except as mentioned in the HPI.    Past Medical History:   Past Medical History:   Diagnosis Date   • Anemia    • Chronic kidney disease     stones   • Depression 2022   • Kidney stone     [facility] in    • Migraine     Stress related   • Ovarian cyst      Past Surgical History:  Past Surgical History:   Procedure Laterality Date   •  SECTION N/A 10/02/2017    Procedure:  SECTION PRIMARY;  Surgeon: Cecily Riggs MD;  Location: [facility] LABOR DELIVERY;  Service:    •  SECTION N/A 2020    Procedure: REPEAT  SECTION WITHOUT TUBAL LIGATION;  Surgeon: Cecily Riggs MD;  Location: [facility] LABOR DELIVERY;  Service: Obstetrics/Gynecology;  Laterality: N/A;   • CLEFT LIP REPAIR     • CLEFT PALATE REPAIR      8 surgeries   • COSMETIC SURGERY      Cleft lip repair [id number]   • HAND OPEN REDUCTION INTERNAL FIXATION     • KNEE ARTHROSCOPY MENISCUS TRANSPLANT Right    • KNEE SURGERY      piece of femur broke off into knee     Social History:  reports that she has never smoked. She has never used  smokeless tobacco. She reports that she does not drink alcohol and does not use drugs.    Family History: family history includes Alcohol abuse in her father; Birth defects in her father; Breast cancer in her paternal grandmother; Cancer in her maternal grandfather and paternal grandmother; Depression in her mother; Hypertension in her father; Melanoma in her paternal grandmother; Prostate cancer in her paternal grandfather; Thyroid disease in her mother.      Allergies:  Allergies   Allergen Reactions   • Amoxicillin      reaction as a baby, pt told by mother not to take amoxicillin.   • Imitrex [Sumatriptan] Nausea And Vomiting and Nausea Only   • Penicillins Other (See Comments)     Makes her crazy - mean.      Medications:  Prior to Admission medications    Medication Sig Start Date End Date Taking? Authorizing Provider   sertraline (ZOLOFT) 50 MG tablet Take 50 mg by mouth Daily. 5/26/22  Yes Provider, Alberto, MD   Norethin Ace-Eth Estrad-FE 1-20 MG-MCG(24) chewable tablet Chew 1 tablet Daily.    Provider, Historical, MD   butenafine (Lotrimin Ultra) 1 % cream Apply 1 application topically to the appropriate area as directed 2 (Two) Times a Day. 8/1/22 11/29/22  Cesilia Benítez APRN   fluconazole (Diflucan) 200 MG tablet Take 1 tablet by mouth Daily. 8/1/22 11/29/22  Cesilia Benítez APRN   hydrOXYzine (ATARAX) 10 MG tablet Take 1 tablet by mouth 3 (Three) Times a Day As Needed for Itching. 8/5/22 11/29/22  Cesilia Benítez APRN   predniSONE (DELTASONE) 20 MG tablet Take 2 pills orally daily for 2 days then 1 daily for 3 days then 1/2 tablet daily for 3 days then stop. 8/5/22 11/29/22  Cesilia Benítez APRN   triamcinolone (KENALOG) 0.1 % ointment Apply 1 application topically to the appropriate area as directed 2 (Two) Times a Day. 7/29/22 11/29/22  Gemini Boo APRN         Objective     Vital Signs: Pulse 93   Temp 98 °F (36.7 °C) (Skin)   Resp 18   Ht 170.2 cm  "(67\")   Wt 89.4 kg (197 lb)   SpO2 98%   BMI 30.85 kg/m²   Physical Exam  Vitals and nursing note reviewed.   Constitutional:       Appearance: Normal appearance.   HENT:      Head: Normocephalic and atraumatic.      Right Ear: External ear normal.      Left Ear: External ear normal.      Nose: Congestion and rhinorrhea present.      Mouth/Throat:      Mouth: Mucous membranes are moist.      Pharynx: Posterior oropharyngeal erythema present.   Eyes:      General:         Right eye: No discharge.         Left eye: No discharge.      Conjunctiva/sclera: Conjunctivae normal.   Cardiovascular:      Rate and Rhythm: Normal rate and regular rhythm.      Pulses: Normal pulses.      Heart sounds: Normal heart sounds.   Pulmonary:      Effort: Pulmonary effort is normal. No respiratory distress.      Breath sounds: Normal breath sounds. No stridor. No wheezing or rhonchi.   Musculoskeletal:         General: Normal range of motion.      Cervical back: Normal range of motion.   Skin:     General: Skin is warm.   Neurological:      General: No focal deficit present.      Mental Status: She is alert and oriented to person, place, and time.   Psychiatric:         Mood and Affect: Mood normal.         Behavior: Behavior normal.         Thought Content: Thought content normal.         Judgment: Judgment normal.         Results Reviewed:  Glucose   Date Value Ref Range Status   08/05/2022 87 65 - 99 mg/dL Final     BUN   Date Value Ref Range Status   08/05/2022 15 6 - 20 mg/dL Final     Creatinine   Date Value Ref Range Status   08/05/2022 0.93 0.57 - 1.00 mg/dL Final     Sodium   Date Value Ref Range Status   08/05/2022 138 134 - 144 mmol/L Final     Potassium   Date Value Ref Range Status   08/05/2022 4.3 3.5 - 5.2 mmol/L Final     Chloride   Date Value Ref Range Status   08/05/2022 101 96 - 106 mmol/L Final     Total CO2   Date Value Ref Range Status   08/05/2022 21 20 - 29 mmol/L Final     Calcium   Date Value Ref Range " Status   08/05/2022 9.7 8.7 - 10.2 mg/dL Final     ALT (SGPT)   Date Value Ref Range Status   08/05/2022 11 0 - 32 IU/L Final   12/21/2020 13 1 - 33 U/L Final     AST (SGOT)   Date Value Ref Range Status   08/05/2022 15 0 - 40 IU/L Final   12/21/2020 25 1 - 32 U/L Final     WBC   Date Value Ref Range Status   11/22/2022 5.2 4.8 - 10.8 K/uL Final     Hematocrit   Date Value Ref Range Status   11/22/2022 45.7 37.0 - 47.0 % Final     Platelets   Date Value Ref Range Status   11/22/2022 263 130 - 400 K/uL Final         Assessment / Plan     Assessment/Plan:  1. Cough, unspecified type  - POCT SARS-CoV-2 Antigen MATHIEU + Flu    2. Fever, unspecified fever cause  - POCT SARS-CoV-2 Antigen MATHIEU + Flu    3. Influenza A  - oseltamivir (Tamiflu) 75 MG capsule; Take 1 capsule by mouth 2 (Two) Times a Day for 5 days.  Dispense: 10 capsule; Refill: 0      Return if symptoms worsen or fail to improve. unless patient needs to be seen sooner or acute issues arise.      I have discussed the patient results/orders and and plan/recommendation with them at today's visit.      Gemini Boo, APRN   11/29/2022

## 2022-12-06 PROBLEM — Q68.6 DISCOID LATERAL MENISCUS OF LEFT KNEE: Status: ACTIVE | Noted: 2022-12-06

## 2022-12-07 NOTE — OP NOTE
Patient Name: John Bhat  : 1990  MRN: [id number]      [address] of SURGERY: 2022    SURGEON: Janet Mckeon. 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 female presented to my clinic with complaints of progressive knee pain and mechanical symptoms after a without a traumatic injury to the knee. She had a history of a discoid lateral meniscus of the right knee requiring surgery and the left knee felt identical.  An MRI was obtained which showed that indeed there was a tear of a discoid lateral meniscus. Pain and mechanical symptoms were not relieved with conservative treatment consisting of anti-inflammatories, corticosteroid injections, physical therapy. Due to progressive pain and loss of function, surgical evaluation was discussed and the patient wished to proceed understanding risks, benefits, and alternatives. Surgical indications were to relieve pain and mechanical symptoms related to an unstable meniscus tear. Risks included, but were not limited to, that of anesthesia, bleeding, infection, pain, damage to local structures, need for further surgery, failure to improve, stiffness, failure of implants, and loss of function.       ESTIMATED BLOOD LOSS: minimal    DRAINS: none     COMPLICATIONS: none    SPECIMENS: none    OPERATIVE FINDINGS:  1) Exam Under Anesthesia:  ROM 0-130, stable ligamentously without laxity, no effusion  2) Patellofemoral Joint: Intact chondral surfaces, no loose bodies in gutters  3) Central Compartment:  Intact ACL/PCL  4) Lateral Compartment:  Intact chondral surface, complete discoid lateral meniscus with central tearng  5) Medial Compartment: intact chondral surfaces, intact medial meniscus    PROCEDURE IN DETAIL:  The patient was seen in the preoperative holding room, the informed consent was reviewed and signed, and the correct operative extremity marked with the patients agreement. The patient was transported to the operating room, where a timeout was performed identifying the correct patient and operative site. Perioperative antibiotics were administered prior to incision. A sterile prep and drape was performed. The operative leg was placed into an arthroscopic leg nelson device, the contralateral leg well protected and a tourniquet was placed about the thigh. Total tourniquet time was <30 minutes. A standard anterolateral arthroscopy portal was established and an outside-in technique was used to establish and anteromedial portal.  The arthroscopic probe was inserted and a thorough diagnostic arthroscopy of the knee was performed as outline above. Attention was turned to the lateral compartment where a probe was used to demonstrate and unstable central tear of a complete discoid lateral meniscus. The meniscus was stable along the periphery. The combination of a biter and shaver were used to debride the central portion of the meniscus back to a stable surface. The probe was reinserted showing no further tear existed and the remaining meniscus to be stable. Free fluid was suctioned from the knee, portals were closed with monocryl suture and steri-strips. A sterile dressing was placed. The patient was awakened from anesthesia, transported to the PACU in stable condition.     POSTOPERATIVE PLAN:  1) Discharge home with family  2) Return to clinic 1 week for a clinical check     Electronically signed by Jayme Wilkerson MD on 12/8/2022 at 9:15 AM

## 2022-12-08 ENCOUNTER — ANESTHESIA EVENT (OUTPATIENT)
Dept: OPERATING ROOM | Age: 32
End: 2022-12-08
Payer: COMMERCIAL

## 2022-12-08 ENCOUNTER — ANESTHESIA (OUTPATIENT)
Dept: OPERATING ROOM | Age: 32
End: 2022-12-08
Payer: COMMERCIAL

## 2022-12-08 ENCOUNTER — HOSPITAL ENCOUNTER (OUTPATIENT)
Age: 32
Setting detail: OUTPATIENT SURGERY
Discharge: HOME | End: 2022-12-08
Attending: ORTHOPAEDIC SURGERY | Admitting: ORTHOPAEDIC SURGERY
Payer: COMMERCIAL

## 2022-12-08 VITALS
RESPIRATION RATE: 20 BRPM | TEMPERATURE: 98.7 F | OXYGEN SATURATION: 98 % | WEIGHT: 200 LBS | HEIGHT: 67 IN | HEART RATE: 91 BPM | SYSTOLIC BLOOD PRESSURE: 136 MMHG | DIASTOLIC BLOOD PRESSURE: 88 MMHG | BODY MASS INDEX: 31.39 KG/M2

## 2022-12-08 DIAGNOSIS — Q68.6 DISCOID LATERAL MENISCUS OF LEFT KNEE: Primary | ICD-10-CM

## 2022-12-08 LAB — HCG(URINE) PREGNANCY TEST: NEGATIVE

## 2022-12-08 PROCEDURE — 7100000011 HC PHASE II RECOVERY - ADDTL 15 MIN: Performed by: ORTHOPAEDIC SURGERY

## 2022-12-08 PROCEDURE — 3600000004 HC SURGERY LEVEL 4 BASE: Performed by: ORTHOPAEDIC SURGERY

## 2022-12-08 PROCEDURE — 7100000001 HC PACU RECOVERY - ADDTL 15 MIN: Performed by: ORTHOPAEDIC SURGERY

## 2022-12-08 PROCEDURE — 7100000010 HC PHASE II RECOVERY - FIRST 15 MIN: Performed by: ORTHOPAEDIC SURGERY

## 2022-12-08 PROCEDURE — 2709999900 HC NON-CHARGEABLE SUPPLY: Performed by: ORTHOPAEDIC SURGERY

## 2022-12-08 PROCEDURE — 84703 CHORIONIC GONADOTROPIN ASSAY: CPT

## 2022-12-08 PROCEDURE — 2580000003 HC RX 258

## 2022-12-08 PROCEDURE — 3700000000 HC ANESTHESIA ATTENDED CARE: Performed by: ORTHOPAEDIC SURGERY

## 2022-12-08 PROCEDURE — 6360000002 HC RX W HCPCS

## 2022-12-08 PROCEDURE — 3600000014 HC SURGERY LEVEL 4 ADDTL 15MIN: Performed by: ORTHOPAEDIC SURGERY

## 2022-12-08 PROCEDURE — 2580000003 HC RX 258: Performed by: ANESTHESIOLOGY

## 2022-12-08 PROCEDURE — 2500000003 HC RX 250 WO HCPCS: Performed by: ANESTHESIOLOGY

## 2022-12-08 PROCEDURE — 7100000000 HC PACU RECOVERY - FIRST 15 MIN: Performed by: ORTHOPAEDIC SURGERY

## 2022-12-08 PROCEDURE — 3700000001 HC ADD 15 MINUTES (ANESTHESIA): Performed by: ORTHOPAEDIC SURGERY

## 2022-12-08 PROCEDURE — 2500000003 HC RX 250 WO HCPCS: Performed by: ORTHOPAEDIC SURGERY

## 2022-12-08 PROCEDURE — 6360000002 HC RX W HCPCS: Performed by: ANESTHESIOLOGY

## 2022-12-08 PROCEDURE — 6360000002 HC RX W HCPCS: Performed by: ORTHOPAEDIC SURGERY

## 2022-12-08 PROCEDURE — 6370000000 HC RX 637 (ALT 250 FOR IP): Performed by: ORTHOPAEDIC SURGERY

## 2022-12-08 PROCEDURE — 2500000003 HC RX 250 WO HCPCS

## 2022-12-08 PROCEDURE — 2580000003 HC RX 258: Performed by: ORTHOPAEDIC SURGERY

## 2022-12-08 RX ORDER — DEXAMETHASONE SODIUM PHOSPHATE 4 MG/ML
4 INJECTION, SOLUTION INTRA-ARTICULAR; INTRALESIONAL; INTRAMUSCULAR; INTRAVENOUS; SOFT TISSUE ONCE
Status: COMPLETED | OUTPATIENT
Start: 2022-12-08 | End: 2022-12-08

## 2022-12-08 RX ORDER — SODIUM CHLORIDE, SODIUM LACTATE, POTASSIUM CHLORIDE, CALCIUM CHLORIDE 600; 310; 30; 20 MG/100ML; MG/100ML; MG/100ML; MG/100ML
INJECTION, SOLUTION INTRAVENOUS CONTINUOUS PRN
Status: DISCONTINUED | OUTPATIENT
Start: 2022-12-08 | End: 2022-12-08 | Stop reason: SDUPTHER

## 2022-12-08 RX ORDER — MIDAZOLAM HYDROCHLORIDE 1 MG/ML
INJECTION INTRAMUSCULAR; INTRAVENOUS PRN
Status: DISCONTINUED | OUTPATIENT
Start: 2022-12-08 | End: 2022-12-08 | Stop reason: SDUPTHER

## 2022-12-08 RX ORDER — DEXAMETHASONE SODIUM PHOSPHATE 10 MG/ML
INJECTION, SOLUTION INTRAMUSCULAR; INTRAVENOUS PRN
Status: DISCONTINUED | OUTPATIENT
Start: 2022-12-08 | End: 2022-12-08 | Stop reason: SDUPTHER

## 2022-12-08 RX ORDER — HYDROCODONE BITARTRATE AND ACETAMINOPHEN 5; 325 MG/1; MG/1
1 TABLET ORAL
Status: COMPLETED | OUTPATIENT
Start: 2022-12-08 | End: 2022-12-08

## 2022-12-08 RX ORDER — LIDOCAINE HYDROCHLORIDE 10 MG/ML
INJECTION, SOLUTION EPIDURAL; INFILTRATION; INTRACAUDAL; PERINEURAL PRN
Status: DISCONTINUED | OUTPATIENT
Start: 2022-12-08 | End: 2022-12-08 | Stop reason: SDUPTHER

## 2022-12-08 RX ORDER — ONDANSETRON 2 MG/ML
INJECTION INTRAMUSCULAR; INTRAVENOUS PRN
Status: DISCONTINUED | OUTPATIENT
Start: 2022-12-08 | End: 2022-12-08 | Stop reason: SDUPTHER

## 2022-12-08 RX ORDER — LIDOCAINE HYDROCHLORIDE 10 MG/ML
INJECTION, SOLUTION INFILTRATION; PERINEURAL PRN
Status: DISCONTINUED | OUTPATIENT
Start: 2022-12-08 | End: 2022-12-08 | Stop reason: ALTCHOICE

## 2022-12-08 RX ORDER — KETOROLAC TROMETHAMINE 30 MG/ML
INJECTION, SOLUTION INTRAMUSCULAR; INTRAVENOUS PRN
Status: DISCONTINUED | OUTPATIENT
Start: 2022-12-08 | End: 2022-12-08 | Stop reason: ALTCHOICE

## 2022-12-08 RX ORDER — FENTANYL CITRATE 50 UG/ML
INJECTION, SOLUTION INTRAMUSCULAR; INTRAVENOUS PRN
Status: DISCONTINUED | OUTPATIENT
Start: 2022-12-08 | End: 2022-12-08 | Stop reason: SDUPTHER

## 2022-12-08 RX ORDER — ONDANSETRON 4 MG/1
4 TABLET, FILM COATED ORAL EVERY 8 HOURS PRN
Qty: 10 TABLET | Refills: 0 | Status: SHIPPED | OUTPATIENT
Start: 2022-12-08

## 2022-12-08 RX ORDER — HYDROMORPHONE HYDROCHLORIDE 1 MG/ML
0.5 INJECTION, SOLUTION INTRAMUSCULAR; INTRAVENOUS; SUBCUTANEOUS EVERY 5 MIN PRN
Status: COMPLETED | OUTPATIENT
Start: 2022-12-08 | End: 2022-12-08

## 2022-12-08 RX ORDER — ONDANSETRON 2 MG/ML
4 INJECTION INTRAMUSCULAR; INTRAVENOUS
Status: DISCONTINUED | OUTPATIENT
Start: 2022-12-08 | End: 2022-12-08 | Stop reason: HOSPADM

## 2022-12-08 RX ORDER — BUPIVACAINE HYDROCHLORIDE 5 MG/ML
INJECTION, SOLUTION EPIDURAL; INTRACAUDAL PRN
Status: DISCONTINUED | OUTPATIENT
Start: 2022-12-08 | End: 2022-12-08 | Stop reason: ALTCHOICE

## 2022-12-08 RX ORDER — HYDROCODONE BITARTRATE AND ACETAMINOPHEN 5; 325 MG/1; MG/1
1 TABLET ORAL EVERY 4 HOURS PRN
Qty: 15 TABLET | Refills: 0 | Status: SHIPPED | OUTPATIENT
Start: 2022-12-08 | End: 2022-12-15

## 2022-12-08 RX ORDER — HYDROMORPHONE HYDROCHLORIDE 1 MG/ML
0.25 INJECTION, SOLUTION INTRAMUSCULAR; INTRAVENOUS; SUBCUTANEOUS EVERY 5 MIN PRN
Status: DISCONTINUED | OUTPATIENT
Start: 2022-12-08 | End: 2022-12-08 | Stop reason: HOSPADM

## 2022-12-08 RX ORDER — PROPOFOL 10 MG/ML
INJECTION, EMULSION INTRAVENOUS PRN
Status: DISCONTINUED | OUTPATIENT
Start: 2022-12-08 | End: 2022-12-08 | Stop reason: SDUPTHER

## 2022-12-08 RX ADMIN — HYDROMORPHONE HYDROCHLORIDE 0.5 MG: 1 INJECTION, SOLUTION INTRAMUSCULAR; INTRAVENOUS; SUBCUTANEOUS at 09:34

## 2022-12-08 RX ADMIN — DEXAMETHASONE SODIUM PHOSPHATE 10 MG: 10 INJECTION, SOLUTION INTRAMUSCULAR; INTRAVENOUS at 08:37

## 2022-12-08 RX ADMIN — Medication 20 MG: at 07:03

## 2022-12-08 RX ADMIN — FENTANYL CITRATE 50 MCG: 50 INJECTION, SOLUTION INTRAMUSCULAR; INTRAVENOUS at 08:43

## 2022-12-08 RX ADMIN — MIDAZOLAM 2 MG: 1 INJECTION INTRAMUSCULAR; INTRAVENOUS at 08:29

## 2022-12-08 RX ADMIN — ONDANSETRON 4 MG: 2 INJECTION INTRAMUSCULAR; INTRAVENOUS at 08:37

## 2022-12-08 RX ADMIN — PROPOFOL 150 MG: 10 INJECTION, EMULSION INTRAVENOUS at 08:34

## 2022-12-08 RX ADMIN — HYDROMORPHONE HYDROCHLORIDE 0.5 MG: 1 INJECTION, SOLUTION INTRAMUSCULAR; INTRAVENOUS; SUBCUTANEOUS at 09:23

## 2022-12-08 RX ADMIN — CEFAZOLIN 2000 MG: 2 INJECTION, POWDER, FOR SOLUTION INTRAMUSCULAR; INTRAVENOUS at 08:39

## 2022-12-08 RX ADMIN — HYDROMORPHONE HYDROCHLORIDE 0.5 MG: 1 INJECTION, SOLUTION INTRAMUSCULAR; INTRAVENOUS; SUBCUTANEOUS at 09:56

## 2022-12-08 RX ADMIN — SODIUM CHLORIDE, SODIUM LACTATE, POTASSIUM CHLORIDE, AND CALCIUM CHLORIDE: 600; 310; 30; 20 INJECTION, SOLUTION INTRAVENOUS at 06:46

## 2022-12-08 RX ADMIN — PROPOFOL 50 MG: 10 INJECTION, EMULSION INTRAVENOUS at 08:35

## 2022-12-08 RX ADMIN — LIDOCAINE HYDROCHLORIDE 50 MG: 10 INJECTION, SOLUTION EPIDURAL; INFILTRATION; INTRACAUDAL; PERINEURAL at 08:35

## 2022-12-08 RX ADMIN — FENTANYL CITRATE 50 MCG: 50 INJECTION, SOLUTION INTRAMUSCULAR; INTRAVENOUS at 08:39

## 2022-12-08 RX ADMIN — HYDROMORPHONE HYDROCHLORIDE 0.5 MG: 1 INJECTION, SOLUTION INTRAMUSCULAR; INTRAVENOUS; SUBCUTANEOUS at 09:46

## 2022-12-08 RX ADMIN — HYDROCODONE BITARTRATE AND ACETAMINOPHEN 1 TABLET: 5; 325 TABLET ORAL at 11:02

## 2022-12-08 RX ADMIN — DEXAMETHASONE SODIUM PHOSPHATE 4 MG: 4 INJECTION, SOLUTION INTRAMUSCULAR; INTRAVENOUS at 07:04

## 2022-12-08 ASSESSMENT — PAIN DESCRIPTION - ORIENTATION
ORIENTATION: LEFT;INNER
ORIENTATION: LEFT
ORIENTATION: LEFT;INNER
ORIENTATION: LEFT
ORIENTATION: LEFT

## 2022-12-08 ASSESSMENT — PAIN DESCRIPTION - DESCRIPTORS
DESCRIPTORS: BURNING
DESCRIPTORS: BURNING;ACHING
DESCRIPTORS: BURNING;SHARP

## 2022-12-08 ASSESSMENT — PAIN SCALES - GENERAL
PAINLEVEL_OUTOF10: 5
PAINLEVEL_OUTOF10: 5
PAINLEVEL_OUTOF10: 7
PAINLEVEL_OUTOF10: 8
PAINLEVEL_OUTOF10: 8
PAINLEVEL_OUTOF10: 7
PAINLEVEL_OUTOF10: 5

## 2022-12-08 ASSESSMENT — PAIN DESCRIPTION - LOCATION
LOCATION: KNEE

## 2022-12-08 ASSESSMENT — PAIN DESCRIPTION - FREQUENCY
FREQUENCY: CONTINUOUS

## 2022-12-08 ASSESSMENT — PAIN DESCRIPTION - PAIN TYPE
TYPE: SURGICAL PAIN

## 2022-12-08 ASSESSMENT — PAIN - FUNCTIONAL ASSESSMENT
PAIN_FUNCTIONAL_ASSESSMENT: ACTIVITIES ARE NOT PREVENTED
PAIN_FUNCTIONAL_ASSESSMENT: NONE - DENIES PAIN
PAIN_FUNCTIONAL_ASSESSMENT: ACTIVITIES ARE NOT PREVENTED

## 2022-12-08 ASSESSMENT — LIFESTYLE VARIABLES: SMOKING_STATUS: 0

## 2022-12-08 ASSESSMENT — PAIN DESCRIPTION - ONSET
ONSET: ON-GOING
ONSET: ON-GOING

## 2022-12-08 NOTE — ANESTHESIA POSTPROCEDURE EVALUATION
Department of Anesthesiology  Postprocedure Note    Patient: [place]  MRN: [id number]  YOB: 1990  Date of evaluation: 12/8/2022      Procedure Summary     Date: 12/08/22 Room / Location: [address]    Anesthesia Start: 0831 Anesthesia Stop: 1213    Procedure: LEFT KNEE PARTIAL LATERAL MENISCECTOMY (Left: Knee) Diagnosis:       Discoid lateral meniscus      (Discoid lateral meniscus [Q68.6])    Surgeons: Germania Marsh MD Responsible Provider: CARLA Gusman CRNA    Anesthesia Type: general ASA Status: 1          Anesthesia Type: No value filed.     Lianna Phase I: Lianna Score: 6    Lianna Phase II:        Anesthesia Post Evaluation    Patient location during evaluation: PACU  Patient participation: waiting for patient participation  Level of consciousness: sleepy but conscious  Pain score: 0  Airway patency: patent  Nausea & Vomiting: no nausea and no vomiting  Complications: no  Cardiovascular status: hemodynamically stable and blood pressure returned to baseline  Respiratory status: acceptable and room air  Hydration status: stable

## 2022-12-08 NOTE — BRIEF OP NOTE
Brief Postoperative Note      Patient: Brenda Meza  YOB: 1990  MRN: [id number]    Date of Procedure: 12/8/2022    Pre-Op Diagnosis: Discoid lateral meniscus [Q68.6]    Post-Op Diagnosis: Same       Procedure(s):  LEFT KNEE PARTIAL LATERAL MENISCECTOMY    Surgeon(s):  Malathi Garner MD    Assistant:  * No surgical staff found *    Anesthesia: General    Estimated Blood Loss (mL): Minimal    Complications: None    Specimens:   * No specimens in log *    Implants:  * No implants in log *      Drains: * No LDAs found *    Findings: see op note    Electronically signed by Malathi Garner MD on 12/8/2022 at 9:13 AM

## 2022-12-08 NOTE — H&P
Pt Name: [place]  MRN: [id number]  YOB: 1990  Date of evaluation: 12/8/2022    H&P including current review of systems was updated in the paper chart and/or the document previously scanned into the record. There have been no significant changes or new problems since the original evaluation. The patient's problems continue and indications for contemplated procedure have not changed.     Electronically signed by Jessica Rosales MD on 12/8/2022 at 7:15 AM

## 2022-12-08 NOTE — ANESTHESIA PRE PROCEDURE
Department of Anesthesiology  Preprocedure Note       Name:  Clark Rosenbaum   Age:  29 y.o.  :  1990                                          MRN:  [id number]         Date:  2022      Surgeon: Yaya Lyons):  Josh Toro MD    Procedure: Procedure(s):  LEFT KNEE PARTIAL LATERAL MENISCECTOMY    Medications prior to admission:   Prior to Admission medications    Medication Sig Start Date End Date Taking? Authorizing Provider   sertraline (ZOLOFT) 50 MG tablet Take 50 mg by mouth daily    Historical Provider, MD   Norethin Ace-Eth Estrad-FE (TYLER 24 FE PO) Take 1 tablet by mouth daily    Historical Provider, MD       Current medications:    Current Facility-Administered Medications   Medication Dose Route Frequency Provider Last Rate Last Admin    ceFAZolin (ANCEF) 2,000 mg in sterile water 20 mL IV syringe  2,000 mg IntraVENous Once Josh Toro MD           Allergies: Allergies   Allergen Reactions    Imitrex [Sumatriptan] Nausea Only    Pcn [Penicillins] Other (See Comments)     Makes her crazy - mean. Problem List:    Patient Active Problem List   Diagnosis Code    Migraine without status migrainosus, not intractable G43.909    Ureteral calculus, right N20.1    Kidney stone N20.0    Discoid lateral meniscus of left knee Q68.6       Past Medical History:        Diagnosis Date    Dizziness     Headache(784.0)     Kidney stone     No periods     for 6-7 yrs.  while on this specific type of bcp       Past Surgical History:        Procedure Laterality Date    CLEFT PALATE REPAIR      12    CYSTOSCOPY Right 2016    CYSTOSCOPY URETEROSCOPY LASER performed by Philip Perez MD at [address] N/A 2016    STENT INSERTION performed by Philip Perez MD at [address]  2    KNEE SURGERY Right     2    LITHOTRIPSY N/A 2016    LITHOTRIPSY LASER performed by Philip Perez MD at [facility] History:    Social History     Tobacco Use    Smoking status: Never    Smokeless tobacco: Never   Substance Use Topics    Alcohol use: Yes     Alcohol/week: 1.0 standard drink     Types: 1 Shots of liquor per week     Comment: 1 per month or less                                Counseling given: Not Answered      Vital Signs (Current):   Vitals:    12/08/22 0640   BP: 118/76   Pulse: 83   Resp: 20   Temp: 99 °F (37.2 °C)   TempSrc: Temporal   SpO2: 98%   Weight: 200 lb (90.7 kg)   Height: 5' 7\" (1.702 m)                                              BP Readings from Last 3 Encounters:   12/08/22 118/76   12/17/19 132/70   03/15/19 120/76       NPO Status: Time of last liquid consumption: 2100                        Time of last solid consumption: 2100                        Date of last liquid consumption: 12/07/22                        Date of last solid food consumption: 12/07/22    BMI:   Wt Readings from Last 3 Encounters:   12/08/22 200 lb (90.7 kg)   11/22/22 196 lb (88.9 kg)   12/17/19 199 lb (90.3 kg)     Body mass index is 31.32 kg/m². CBC:   Lab Results   Component Value Date/Time    WBC 5.2 11/22/2022 08:55 AM    RBC 5.00 11/22/2022 08:55 AM    HGB 14.5 11/22/2022 08:55 AM    HCT 45.7 11/22/2022 08:55 AM    MCV 91.4 11/22/2022 08:55 AM    RDW 12.6 11/22/2022 08:55 AM     11/22/2022 08:55 AM       CMP:   Lab Results   Component Value Date/Time     10/03/2016 06:27 AM    K 3.8 10/03/2016 06:27 AM    CL 99 10/03/2016 06:27 AM    CO2 22 10/03/2016 06:27 AM    BUN 11 10/03/2016 06:27 AM    CREATININE 1.0 10/03/2016 06:27 AM    LABGLOM >60 10/03/2016 06:27 AM    GLUCOSE 72 10/03/2016 06:27 AM    PROT 7.7 10/03/2016 06:27 AM    CALCIUM 9.5 10/03/2016 06:27 AM    BILITOT 0.6 10/03/2016 06:27 AM    ALKPHOS 52 10/03/2016 06:27 AM    AST 16 10/03/2016 06:27 AM    ALT 10 10/03/2016 06:27 AM       POC Tests: No results for input(s): POCGLU, POCNA, POCK, POCCL, POCBUN, POCHEMO, POCHCT in the last 72 hours.     Coags: No results found for: PROTIME, INR, APTT    HCG (If Applicable):   Lab Results   Component Value Date    PREGTESTUR Negative 12/08/2022        ABGs: No results found for: PHART, PO2ART, ZHF6SPS, HYY8MGN, BEART, D7DDSSXS     Type & Screen (If Applicable):  No results found for: LABABO, LABRH    Drug/Infectious Status (If Applicable):  No results found for: HIV, HEPCAB    COVID-19 Screening (If Applicable): No results found for: COVID19        Anesthesia Evaluation  Patient summary reviewed no history of anesthetic complications:   Airway: Mallampati: I  TM distance: >3 FB   Neck ROM: full  Mouth opening: > = 3 FB   Dental: normal exam         Pulmonary:normal exam  breath sounds clear to auscultation      (-) asthma, recent URI, sleep apnea and not a current smoker          Patient did not smoke on day of surgery. Cardiovascular:  Exercise tolerance: good (>4 METS),       (-) pacemaker, hypertension, past MI, CABG/stent and  angina      Rhythm: regular  Rate: normal           Beta Blocker:  Not on Beta Blocker         Neuro/Psych:   (+) headaches: migraine headaches,    (-) seizures, TIA and CVA           GI/Hepatic/Renal:   (+) renal disease: kidney stones,      (-) GERD and liver disease       Endo/Other:        (-) diabetes mellitus, hypothyroidism, hyperthyroidism               Abdominal:             Vascular: Other Findings:           Anesthesia Plan      general     ASA 1     (Preop famotidine, dexamethasone)  Induction: intravenous. MIPS: Postoperative opioids intended and Prophylactic antiemetics administered. Anesthetic plan and risks discussed with patient and spouse. Use of blood products discussed with patient and spouse whom consented to blood products.                      Kanika Pagan MD   12/8/2022

## 2022-12-09 NOTE — PROGRESS NOTES
CLINICAL PHARMACY NOTE: MEDS TO BEDS    Total # of Prescriptions Filled: 2   The following medications were delivered to the patient:  Discharge Medication List as of 12/8/2022 10:08 AM        START taking these medications    Details   HYDROcodone-acetaminophen (NORCO) 5-325 MG per tablet Take 1 tablet by mouth every 4 hours as needed for Pain for up to 7 days. , Disp-15 tablet, R-0Normal      ondansetron (ZOFRAN) 4 MG tablet Take 1 tablet by mouth every 8 hours as needed for Nausea or Vomiting, Disp-10 tablet, R-0Normal               Additional Documentation:   Delivered Rx's to [facility]. Gave Rx's to patients spouse Tato Bean. Patient paid $0.00 copay.

## 2022-12-11 ENCOUNTER — HOSPITAL ENCOUNTER (INPATIENT)
Facility: HOSPITAL | Age: 32
LOS: 2 days | Discharge: HOME | End: 2022-12-13
Attending: FAMILY MEDICINE | Admitting: INTERNAL MEDICINE

## 2022-12-11 PROBLEM — I26.92 SADDLE EMBOLUS OF PULMONARY ARTERY (HCC): Status: ACTIVE | Noted: 2022-12-11

## 2022-12-11 PROBLEM — I26.09 PULMONARY EMBOLISM WITH ACUTE COR PULMONALE (HCC): Status: ACTIVE | Noted: 2022-12-11

## 2022-12-11 LAB
NT-PROBNP SERPL-MCNC: 1197 PG/ML (ref 0–450)
TROPONIN T SERPL-MCNC: <0.01 NG/ML (ref 0–0.03)

## 2022-12-11 PROCEDURE — 99222 1ST HOSP IP/OBS MODERATE 55: CPT | Performed by: INTERNAL MEDICINE

## 2022-12-11 PROCEDURE — 84484 ASSAY OF TROPONIN QUANT: CPT | Performed by: INTERNAL MEDICINE

## 2022-12-11 PROCEDURE — 83880 ASSAY OF NATRIURETIC PEPTIDE: CPT | Performed by: INTERNAL MEDICINE

## 2022-12-11 RX ORDER — ALBUTEROL SULFATE 2.5 MG/3ML
2.5 SOLUTION RESPIRATORY (INHALATION) EVERY 4 HOURS PRN
Status: DISCONTINUED | OUTPATIENT
Start: 2022-12-11 | End: 2022-12-13 | Stop reason: HOSPADM

## 2022-12-11 RX ORDER — SODIUM CHLORIDE 9 MG/ML
40 INJECTION, SOLUTION INTRAVENOUS AS NEEDED
Status: DISCONTINUED | OUTPATIENT
Start: 2022-12-11 | End: 2022-12-13 | Stop reason: HOSPADM

## 2022-12-11 RX ORDER — ENOXAPARIN SODIUM 100 MG/ML
1 INJECTION SUBCUTANEOUS ONCE
Status: DISCONTINUED | OUTPATIENT
Start: 2022-12-11 | End: 2022-12-11

## 2022-12-11 RX ORDER — SODIUM CHLORIDE 0.9 % (FLUSH) 0.9 %
10 SYRINGE (ML) INJECTION AS NEEDED
Status: DISCONTINUED | OUTPATIENT
Start: 2022-12-11 | End: 2022-12-13 | Stop reason: HOSPADM

## 2022-12-11 RX ORDER — ENOXAPARIN SODIUM 100 MG/ML
1 INJECTION SUBCUTANEOUS EVERY 12 HOURS
Status: DISCONTINUED | OUTPATIENT
Start: 2022-12-12 | End: 2022-12-12

## 2022-12-11 RX ORDER — ACETAMINOPHEN 325 MG/1
650 TABLET ORAL EVERY 6 HOURS PRN
Status: DISCONTINUED | OUTPATIENT
Start: 2022-12-11 | End: 2022-12-13 | Stop reason: HOSPADM

## 2022-12-11 RX ORDER — SODIUM CHLORIDE 0.9 % (FLUSH) 0.9 %
10 SYRINGE (ML) INJECTION EVERY 12 HOURS SCHEDULED
Status: DISCONTINUED | OUTPATIENT
Start: 2022-12-11 | End: 2022-12-13 | Stop reason: HOSPADM

## 2022-12-11 RX ADMIN — Medication 10 ML: at 20:00

## 2022-12-11 RX ADMIN — ACETAMINOPHEN 650 MG: 325 TABLET, FILM COATED ORAL at 18:49

## 2022-12-11 RX ADMIN — SERTRALINE HYDROCHLORIDE 50 MG: 50 TABLET, FILM COATED ORAL at 18:39

## 2022-12-11 NOTE — CONSULTS
Inpatient Cardiology Consult  Consult performed by: Ezequiel Bray MD  Consult ordered by: Tunde Treadwell MD  Reason for consult: Pulmonary embolus with cor pulmonale      Chief Complaint: Shortness of breath, chest discomfort    HPI: This is a 32-year-old female who presents on transfer from outside hospital with bilateral pulmonary emboli.  Cardiology is asked to assist in management.  The patient recently had meniscus surgery by orthopedic surgery.  This was on Thursday.  Then last night, when lying down to bed, the patient notes that she became symptomatic with diaphoresis, nausea and chest pressure.  She says that it felt like someone was sitting on her chest.  Also felt like her heart was racing at that time.  Ultimately, the heart rate came down.  The patient then woke up this morning and had a recurrence of similar type symptoms with substernal chest pressure, moderate in severity, nonradiating, associated with shortness of breath, dyspnea on exertion, lightheadedness, dizziness.  The patient notes intermittent nausea and diaphoresis.  She presented to an outside hospital and was found to have bilateral pulmonary emboli.    She denies having any prior history of thromboembolic disease.  She does take oral contraceptives.  No family history of thromboembolic disease.    The patient denies having any issues with her surgical site.  She has noticed no significant lower extremity edema, redness, warmth.    Past Medical History:   Diagnosis Date   • Anemia    • Chronic kidney disease     stones   • Depression 2022   • Kidney stone     [facility] in    • Migraine     Stress related   • Ovarian cyst      Past Surgical History:   Procedure Laterality Date   •  SECTION N/A 10/02/2017    Procedure:  SECTION PRIMARY;  Surgeon: Cecily Riggs MD;  Location: [facility] LABOR DELIVERY;  Service:    •  SECTION N/A 2020    Procedure: REPEAT  SECTION WITHOUT TUBAL  LIGATION;  Surgeon: Cecily Riggs MD;  Location: [facility] LABOR DELIVERY;  Service: Obstetrics/Gynecology;  Laterality: N/A;   • CLEFT LIP REPAIR     • CLEFT PALATE REPAIR      8 surgeries   • COSMETIC SURGERY      Cleft lip repair [id number]   • HAND OPEN REDUCTION INTERNAL FIXATION     • KNEE ARTHROSCOPY MENISCUS TRANSPLANT Right    • KNEE ARTHROSCOPY W/ MENISCAL REPAIR Left    • KNEE SURGERY      piece of femur broke off into knee     Prior to Admission medications    Medication Sig Start Date End Date Taking? Authorizing Provider   Norethin Ace-Eth Estrad-FE 1-20 MG-MCG(24) chewable tablet Chew 1 tablet Daily.   Yes Provider, MD Alberto   sertraline (ZOLOFT) 50 MG tablet Take 50 mg by mouth Daily. 5/26/22  Yes Provider, MD Alberto     Allergies   Allergen Reactions   • Amoxicillin      reaction as a baby, pt told by mother not to take amoxicillin.   • Imitrex [Sumatriptan] Nausea And Vomiting and Nausea Only   • Penicillins Other (See Comments)     Makes her crazy - mean.      Social History     Tobacco Use   • Smoking status: Never   • Smokeless tobacco: Never   Substance Use Topics   • Alcohol use: No     Family History   Problem Relation Age of Onset   • Hypertension Father    • Alcohol abuse Father    • Birth defects Father         Bilateral Cleft Lip   • Prostate cancer Paternal Grandfather    • Breast cancer Paternal Grandmother    • Melanoma Paternal Grandmother    • Cancer Paternal Grandmother         Breast, Skin, and Pancreatic cancers   • Depression Mother    • Thyroid disease Mother    • Cancer Maternal Grandfather         Prostate cancer     Review of Systems   Constitutional: Positive for diaphoresis. Negative for chills, fever and weight loss.   HENT: Negative for congestion and hearing loss.    Eyes: Negative for blurred vision and pain.   Cardiovascular: Positive for chest pain and dyspnea on exertion. Negative for leg swelling, orthopnea, palpitations, paroxysmal nocturnal  "dyspnea and syncope.   Respiratory: Positive for cough and shortness of breath. Negative for wheezing.    Endocrine: Negative for cold intolerance and heat intolerance.   Hematologic/Lymphatic: Negative for adenopathy and bleeding problem.   Skin: Negative for color change, poor wound healing and rash.   Musculoskeletal: Negative for myalgias and neck pain.   Gastrointestinal: Positive for nausea. Negative for abdominal pain and vomiting.   Genitourinary: Negative for dysuria and frequency.   Neurological: Positive for dizziness and light-headedness. Negative for focal weakness, loss of balance and numbness.   Psychiatric/Behavioral: Negative for altered mental status and memory loss.   Allergic/Immunologic: Negative for hives and persistent infections.     Physical Exam:    /79   Pulse 82   Temp 98 °F (36.7 °C) (Oral)   Resp 12   Ht 170.2 cm (67\")   Wt 91.4 kg (201 lb 8 oz)   SpO2 100%   BMI 31.56 kg/m²   Temp:  [98 °F (36.7 °C)] 98 °F (36.7 °C)  Heart Rate:  [74-85] 82  Resp:  [12] 12  BP: (108-112)/(79-82) 112/79    Vitals reviewed.   Constitutional:       General: Not in acute distress.     Appearance: Normal and healthy appearance. Well-developed. Not toxic-appearing or diaphoretic.   Eyes:      General: Lids are normal.      Extraocular Movements: Extraocular movements intact.      Pupils: Pupils are equal, round, and reactive to light.   HENT:      Head: Normocephalic and atraumatic.      Right Ear: External ear normal.      Left Ear: External ear normal.      Nose: Nose normal.    Mouth/Throat:      Mouth: Mucous membranes are not pale, not dry and not cyanotic.   Neck:      Thyroid: No thyroid mass or thyromegaly.      Vascular: No carotid bruit, hepatojugular reflux or JVD.      Trachea: No tracheal deviation.      Lymphadenopathy: No cervical adenopathy.   Pulmonary:      Effort: Pulmonary effort is normal. No accessory muscle usage or respiratory distress.      Breath sounds: Normal breath " sounds. No wheezing. No rhonchi. No rales.   Chest:      Chest wall: Not tender to palpatation.   Cardiovascular:      Normal rate. Regular rhythm.      Murmurs: There is no murmur.      No gallop.   Pulses:     Intact distal pulses.   Edema:     Peripheral edema absent.   Abdominal:      General: Bowel sounds are normal. There is no distension or abdominal bruit.      Palpations: Abdomen is soft.      Tenderness: There is no abdominal tenderness.   Musculoskeletal: Normal range of motion.         General: No tenderness or deformity.      Extremities: No clubbing present.     Cervical back: Normal range of motion and neck supple. No edema.      Comments: Left knee surgical incision appears to be clean, dry, intact, no surrounding erythema or warmth Skin:     General: Skin is warm and dry. There is no cyanosis.      Findings: No erythema or rash.   Neurological:      General: No focal deficit present.      Mental Status: Oriented to person, place, and time and oriented to person, place and time.      Cranial Nerves: No cranial nerve deficit.   Psychiatric:         Attention and Perception: Attention normal.         Mood and Affect: Mood normal.         Speech: Speech normal.         Behavior: Behavior normal. Behavior is cooperative.         Thought Content: Thought content normal.       Diagnostic Data:    From outside hospital:    D-dimer 16.6  High-sensitivity troponin of 115, 102    White blood cell count 11, hemoglobin 13.6, hematocrit 40.8, platelets 191  Sodium 141, potassium 3.8, chloride 102, bicarb 29, BUN 15, creatinine 0.9, glucose 95    ECG: Normal sinus rhythm, 83 bpm, nonspecific ST changes    CTA chest: Flattening of the interventricular septum with bilateral pulmonary emboli noted    ASSESSMENT/PLAN:    1.  Acute bilateral pulmonary emboli with concern for acute cor pulmonale  2.  Recent meniscus surgery  3.  Oral contraceptive use    -While this patient does have bilateral pulmonary emboli, she is  hemodynamically stable at this time.  She is in sinus rhythm with a heart rate in the 80s and with oxygen saturation of 100% on 2 L nasal cannula.  She is quite comfortable in appearance.  Her high-sensitivity troponin level was elevated at the outside hospital but did not demonstrate a significant rise on consecutive checks.  At this time, I would not feel that she would be in need of any emergent invasive therapies for her pulmonary emboli.  -Would recommend therapeutic Lovenox at this time.  -Check troponin levels here along with BNP.  -Echocardiogram to further evaluate right ventricular size and function.  -Would recommend to leave the patient n.p.o. after midnight in case any invasive procedures are thought necessary tomorrow.  -Thank you for this consultation.  We will evaluate again in the morning.  If the patient becomes has worsening symptoms overnight, please feel free to call if there are questions.

## 2022-12-11 NOTE — H&P
[facility] Medicine Services  HISTORY AND PHYSICAL    Date of Admission: 12/11/2022  Primary Care Physician: Cesilia Benítez APRN    Subjective     Chief Complaint: Saddle embolus.    History of Present Illness  Patient is a 32-year-old transfer from [facility] for management of saddle embolism.  Laboratory shows elevated troponin elevated D-dimer and elevated BNP.  Patient presented with chest pain shortness of breath.  Patient also has recent meniscus repair surgery.  Patient received Lovenox in ER.  CT scan shows large bilateral saddle embolus with large clot burden and right heart strain.    Beta-hCG-negative.  D-dimer 16, troponin 115.    Glucose 95, BUN 15, creatinine 1.9, sodium is 141, potassium 3.8, chlorides 102, CO2 26, anion gap 12, AST 21, albumin 3.7, total protein 8.2, alkaline phosphatase 84, total bili 1.6, calcium 9.1, ALT is 31, GFR 74.  CK-MB 1, CK 50, , and magnesium 1.9, troponin 102.  White blood cells 11, hemoglobin 13, hematocrit 40, platelets 191.  Respiratory panel-negative.  EKG-normal sinus rhythm crushable anterior ischemia.    Chest x-ray- negative  CT scan shows large bilateral saddle embolus with large clot burden and right heart strain     Review of Systems   Constitutional: Negative for activity change, appetite change, chills and fever.   HENT: Negative for hearing loss, nosebleeds, tinnitus and trouble swallowing.    Eyes: Negative for visual disturbance.   Respiratory: Positive for shortness of breath. Negative for cough, chest tightness and wheezing.    Cardiovascular: Positive for chest pain. Negative for palpitations and leg swelling.   Gastrointestinal: Negative for abdominal distention, abdominal pain, blood in stool, constipation, diarrhea, nausea and vomiting.   Endocrine: Negative for cold intolerance, heat intolerance, polydipsia, polyphagia and polyuria.   Genitourinary: Negative for decreased urine  volume, difficulty urinating, dysuria, flank pain, frequency and hematuria.   Musculoskeletal: Negative for arthralgias, joint swelling and myalgias.   Skin: Negative for rash.   Allergic/Immunologic: Negative for immunocompromised state.   Neurological: Negative for dizziness, syncope, weakness, light-headedness and headaches.   Hematological: Negative for adenopathy. Does not bruise/bleed easily.   Psychiatric/Behavioral: Negative for confusion and sleep disturbance. The patient is not nervous/anxious.         Otherwise complete ROS reviewed and negative except as mentioned in the HPI.      Past Medical History:   Past Medical History:   Diagnosis Date   • Anemia    • Chronic kidney disease     stones   • Depression 2022   • Kidney stone     [facility] in    • Migraine     Stress related   • Ovarian cyst        Past Surgical History:  Past Surgical History:   Procedure Laterality Date   •  SECTION N/A 10/02/2017    Procedure:  SECTION PRIMARY;  Surgeon: Cecily Riggs MD;  Location: [facility] LABOR DELIVERY;  Service:    •  SECTION N/A 2020    Procedure: REPEAT  SECTION WITHOUT TUBAL LIGATION;  Surgeon: Cecily Riggs MD;  Location: [facility] LABOR DELIVERY;  Service: Obstetrics/Gynecology;  Laterality: N/A;   • CLEFT LIP REPAIR     • CLEFT PALATE REPAIR      8 surgeries   • COSMETIC SURGERY      Cleft lip repair [id number]   • HAND OPEN REDUCTION INTERNAL FIXATION     • KNEE ARTHROSCOPY MENISCUS TRANSPLANT Right    • KNEE ARTHROSCOPY W/ MENISCAL REPAIR Left    • KNEE SURGERY      piece of femur broke off into knee       Family History: family history includes Alcohol abuse in her father; Birth defects in her father; Breast cancer in her paternal grandmother; Cancer in her maternal grandfather and paternal grandmother; Depression in her mother; Hypertension in her father; Melanoma in her paternal grandmother; Prostate cancer in her paternal grandfather; Thyroid  "disease in her mother.    Social History:  reports that she has never smoked. She has never used smokeless tobacco. She reports that she does not drink alcohol and does not use drugs.    Allergies:  Allergies   Allergen Reactions   • Amoxicillin      reaction as a baby, pt told by mother not to take amoxicillin.   • Imitrex [Sumatriptan] Nausea And Vomiting and Nausea Only   • Penicillins Other (See Comments)     Makes her crazy - mean.      Medications:  Prior to Admission medications    Medication Sig Start Date End Date Taking? Authorizing Provider   Norethin Ace-Eth Estrad-FE 1-20 MG-MCG(24) chewable tablet Chew 1 tablet Daily.   Yes ProviderAlberto MD   sertraline (ZOLOFT) 50 MG tablet Take 50 mg by mouth Daily. 5/26/22  Yes ProviderAlberto MD       I have utilized all available immediate resources to obtain, update, and review the patient's current medications.    Objective     Vital Signs: /79   Pulse 82   Temp 98 °F (36.7 °C) (Oral)   Resp 12   Ht 170.2 cm (67\")   Wt 91.4 kg (201 lb 8 oz)   SpO2 100%   BMI 31.56 kg/m²   Physical Exam  Vitals and nursing note reviewed.   HENT:      Head: Normocephalic and atraumatic.   Eyes:      Conjunctiva/sclera: Conjunctivae normal.      Pupils: Pupils are equal, round, and reactive to light.   Neck:      Vascular: No JVD.   Cardiovascular:      Rate and Rhythm: Normal rate and regular rhythm.      Heart sounds: Normal heart sounds.   Pulmonary:      Effort: Pulmonary effort is normal. No respiratory distress.      Breath sounds: Normal breath sounds. No wheezing or rales.   Chest:      Chest wall: No tenderness.   Abdominal:      General: Bowel sounds are normal. There is no distension.      Palpations: Abdomen is soft.      Tenderness: There is no abdominal tenderness.   Musculoskeletal:         General: No tenderness or deformity. Normal range of motion.      Cervical back: Neck supple.   Skin:     General: Skin is warm and dry.      Capillary " Refill: Capillary refill takes 2 to 3 seconds.      Findings: No rash.   Neurological:      Mental Status: She is alert and oriented to person, place, and time.      Cranial Nerves: No cranial nerve deficit.      Motor: No abnormal muscle tone.      Deep Tendon Reflexes: Reflexes normal.   Psychiatric:         Mood and Affect: Mood normal.         Behavior: Behavior normal.         Thought Content: Thought content normal.         Judgment: Judgment normal.             Results Reviewed:    Lab Results (last 24 hours)     ** No results found for the last 24 hours. **           Radiology Data:    Imaging Results (Last 24 Hours)     ** No results found for the last 24 hours. **          I have personally reviewed and interpreted the radiology studies and ECG obtained at time of admission.     Assessment / Plan      Assessment & Plan  Active Hospital Problems    Diagnosis    • Pulmonary embolism with acute cor pulmonale (HCC)      Plan.    Saddle embolus.  Stop birth control.  Cardiology consult.  Lovenox therapeutic.  Patient received a dose of the hospital  Chest x-ray- negative  CT scan shows large bilateral saddle embolus with large clot burden and right heart strain   On 2 L of oxygen.  Echocardiogram . Doppler ultrasound lower extremity    Depression . Zoloft.    Patient has recent left meniscus repair surgery 12/8/2022.    Nutrition.  Regular diet.    Code Status/Advanced Care Plan: Full code.    The patient's surrogate decision maker is Matt, .      I discussed the patient's findings and my recommendations with: Patient and     Estimated length of stay: 3 to 6 days    Electronically signed by Tunde Treadwell MD, 12/11/22, 4:29 PM CST.

## 2022-12-12 ENCOUNTER — APPOINTMENT (OUTPATIENT)
Dept: CARDIOLOGY | Facility: HOSPITAL | Age: 32
End: 2022-12-12

## 2022-12-12 ENCOUNTER — APPOINTMENT (OUTPATIENT)
Dept: ULTRASOUND IMAGING | Facility: HOSPITAL | Age: 32
End: 2022-12-12

## 2022-12-12 PROBLEM — E66.9 OBESITY (BMI 30-39.9): Status: ACTIVE | Noted: 2022-12-12

## 2022-12-12 PROBLEM — Z78.9 USES BIRTH CONTROL: Status: ACTIVE | Noted: 2022-12-12

## 2022-12-12 LAB
ALBUMIN SERPL-MCNC: 4 G/DL (ref 3.5–5.2)
ALBUMIN/GLOB SERPL: 1.3 G/DL
ALP SERPL-CCNC: 76 U/L (ref 39–117)
ALT SERPL W P-5'-P-CCNC: 18 U/L (ref 1–33)
ANION GAP SERPL CALCULATED.3IONS-SCNC: 10 MMOL/L (ref 5–15)
AST SERPL-CCNC: 18 U/L (ref 1–32)
BASOPHILS # BLD AUTO: 0.02 10*3/MM3 (ref 0–0.2)
BASOPHILS NFR BLD AUTO: 0.3 % (ref 0–1.5)
BH CV ECHO LEFT VENTRICLE GLOBAL LONGITUDINAL STRAIN: -14 %
BH CV ECHO MEAS - AO MAX PG: 7.7 MMHG
BH CV ECHO MEAS - AO MEAN PG: 5 MMHG
BH CV ECHO MEAS - AO ROOT DIAM: 2.8 CM
BH CV ECHO MEAS - AO V2 MAX: 139 CM/SEC
BH CV ECHO MEAS - AO V2 VTI: 28.5 CM
BH CV ECHO MEAS - AVA(I,D): 2.6 CM2
BH CV ECHO MEAS - EDV(CUBED): 114.8 ML
BH CV ECHO MEAS - EDV(MOD-SP2): 82.8 ML
BH CV ECHO MEAS - EDV(MOD-SP4): 103 ML
BH CV ECHO MEAS - EF(MOD-BP): 57.2 %
BH CV ECHO MEAS - EF(MOD-SP2): 54.8 %
BH CV ECHO MEAS - EF(MOD-SP4): 60.7 %
BH CV ECHO MEAS - ESV(CUBED): 46.3 ML
BH CV ECHO MEAS - ESV(MOD-SP2): 37.4 ML
BH CV ECHO MEAS - ESV(MOD-SP4): 40.5 ML
BH CV ECHO MEAS - FS: 26.1 %
BH CV ECHO MEAS - IVS/LVPW: 1.02 CM
BH CV ECHO MEAS - IVSD: 0.89 CM
BH CV ECHO MEAS - LA DIMENSION: 2.1 CM
BH CV ECHO MEAS - LAT PEAK E' VEL: 16.1 CM/SEC
BH CV ECHO MEAS - LV DIASTOLIC VOL/BSA (35-75): 50.8 CM2
BH CV ECHO MEAS - LV MASS(C)D: 146.3 GRAMS
BH CV ECHO MEAS - LV MAX PG: 4.6 MMHG
BH CV ECHO MEAS - LV MEAN PG: 3 MMHG
BH CV ECHO MEAS - LV SYSTOLIC VOL/BSA (12-30): 20 CM2
BH CV ECHO MEAS - LV V1 MAX: 107 CM/SEC
BH CV ECHO MEAS - LV V1 VTI: 21.7 CM
BH CV ECHO MEAS - LVIDD: 4.9 CM
BH CV ECHO MEAS - LVIDS: 3.6 CM
BH CV ECHO MEAS - LVOT AREA: 3.5 CM2
BH CV ECHO MEAS - LVOT DIAM: 2.1 CM
BH CV ECHO MEAS - LVPWD: 0.87 CM
BH CV ECHO MEAS - MED PEAK E' VEL: 12.6 CM/SEC
BH CV ECHO MEAS - MV A MAX VEL: 36.4 CM/SEC
BH CV ECHO MEAS - MV DEC TIME: 0.2 MSEC
BH CV ECHO MEAS - MV E MAX VEL: 67.9 CM/SEC
BH CV ECHO MEAS - MV E/A: 1.87
BH CV ECHO MEAS - PA V2 MAX: 59.2 CM/SEC
BH CV ECHO MEAS - RAP SYSTOLE: 5 MMHG
BH CV ECHO MEAS - RVSP: 23.3 MMHG
BH CV ECHO MEAS - SI(MOD-SP2): 22.4 ML/M2
BH CV ECHO MEAS - SI(MOD-SP4): 30.8 ML/M2
BH CV ECHO MEAS - SV(LVOT): 75.2 ML
BH CV ECHO MEAS - SV(MOD-SP2): 45.4 ML
BH CV ECHO MEAS - SV(MOD-SP4): 62.5 ML
BH CV ECHO MEAS - TR MAX PG: 18.3 MMHG
BH CV ECHO MEAS - TR MAX VEL: 214 CM/SEC
BH CV ECHO MEASUREMENTS AVERAGE E/E' RATIO: 4.73
BILIRUB SERPL-MCNC: 0.5 MG/DL (ref 0–1.2)
BUN SERPL-MCNC: 17 MG/DL (ref 6–20)
BUN/CREAT SERPL: 26.6 (ref 7–25)
CALCIUM SPEC-SCNC: 9.2 MG/DL (ref 8.6–10.5)
CHLORIDE SERPL-SCNC: 104 MMOL/L (ref 98–107)
CHOLEST SERPL-MCNC: 214 MG/DL (ref 0–200)
CO2 SERPL-SCNC: 25 MMOL/L (ref 22–29)
CREAT SERPL-MCNC: 0.64 MG/DL (ref 0.57–1)
DEPRECATED RDW RBC AUTO: 41.5 FL (ref 37–54)
EGFRCR SERPLBLD CKD-EPI 2021: 122.1 ML/MIN/1.73
EOSINOPHIL # BLD AUTO: 0.08 10*3/MM3 (ref 0–0.4)
EOSINOPHIL NFR BLD AUTO: 1.2 % (ref 0.3–6.2)
ERYTHROCYTE [DISTWIDTH] IN BLOOD BY AUTOMATED COUNT: 12.6 % (ref 12.3–15.4)
GLOBULIN UR ELPH-MCNC: 3.2 GM/DL
GLUCOSE SERPL-MCNC: 97 MG/DL (ref 65–99)
HBA1C MFR BLD: 5.1 % (ref 4.8–5.6)
HCT VFR BLD AUTO: 39.1 % (ref 34–46.6)
HDLC SERPL-MCNC: 47 MG/DL (ref 40–60)
HGB BLD-MCNC: 12.3 G/DL (ref 12–15.9)
IMM GRANULOCYTES # BLD AUTO: 0.02 10*3/MM3 (ref 0–0.05)
IMM GRANULOCYTES NFR BLD AUTO: 0.3 % (ref 0–0.5)
LDLC SERPL CALC-MCNC: 143 MG/DL (ref 0–100)
LDLC/HDLC SERPL: 2.98 {RATIO}
LEFT ATRIUM VOLUME INDEX: 16.7 ML/M2
LEFT ATRIUM VOLUME: 34 ML
LYMPHOCYTES # BLD AUTO: 2.75 10*3/MM3 (ref 0.7–3.1)
LYMPHOCYTES NFR BLD AUTO: 39.6 % (ref 19.6–45.3)
MAXIMAL PREDICTED HEART RATE: 190 BPM
MCH RBC QN AUTO: 28.2 PG (ref 26.6–33)
MCHC RBC AUTO-ENTMCNC: 31.5 G/DL (ref 31.5–35.7)
MCV RBC AUTO: 89.7 FL (ref 79–97)
MONOCYTES # BLD AUTO: 0.42 10*3/MM3 (ref 0.1–0.9)
MONOCYTES NFR BLD AUTO: 6.1 % (ref 5–12)
NEUTROPHILS NFR BLD AUTO: 3.65 10*3/MM3 (ref 1.7–7)
NEUTROPHILS NFR BLD AUTO: 52.5 % (ref 42.7–76)
NRBC BLD AUTO-RTO: 0 /100 WBC (ref 0–0.2)
PLATELET # BLD AUTO: 197 10*3/MM3 (ref 140–450)
PMV BLD AUTO: 10.3 FL (ref 6–12)
POTASSIUM SERPL-SCNC: 3.7 MMOL/L (ref 3.5–5.2)
PROT SERPL-MCNC: 7.2 G/DL (ref 6–8.5)
RBC # BLD AUTO: 4.36 10*6/MM3 (ref 3.77–5.28)
SODIUM SERPL-SCNC: 139 MMOL/L (ref 136–145)
STRESS TARGET HR: 162 BPM
TRIGL SERPL-MCNC: 135 MG/DL (ref 0–150)
TSH SERPL DL<=0.05 MIU/L-ACNC: 0.92 UIU/ML (ref 0.27–4.2)
VLDLC SERPL-MCNC: 24 MG/DL (ref 5–40)
WBC NRBC COR # BLD: 6.94 10*3/MM3 (ref 3.4–10.8)

## 2022-12-12 PROCEDURE — 85025 COMPLETE CBC W/AUTO DIFF WBC: CPT | Performed by: FAMILY MEDICINE

## 2022-12-12 PROCEDURE — 93306 TTE W/DOPPLER COMPLETE: CPT | Performed by: INTERNAL MEDICINE

## 2022-12-12 PROCEDURE — 93970 EXTREMITY STUDY: CPT | Performed by: SURGERY

## 2022-12-12 PROCEDURE — 83036 HEMOGLOBIN GLYCOSYLATED A1C: CPT | Performed by: FAMILY MEDICINE

## 2022-12-12 PROCEDURE — 93356 MYOCRD STRAIN IMG SPCKL TRCK: CPT

## 2022-12-12 PROCEDURE — 84443 ASSAY THYROID STIM HORMONE: CPT | Performed by: FAMILY MEDICINE

## 2022-12-12 PROCEDURE — 25010000002 ENOXAPARIN PER 10 MG: Performed by: FAMILY MEDICINE

## 2022-12-12 PROCEDURE — 93356 MYOCRD STRAIN IMG SPCKL TRCK: CPT | Performed by: INTERNAL MEDICINE

## 2022-12-12 PROCEDURE — 99233 SBSQ HOSP IP/OBS HIGH 50: CPT | Performed by: INTERNAL MEDICINE

## 2022-12-12 PROCEDURE — 80053 COMPREHEN METABOLIC PANEL: CPT | Performed by: FAMILY MEDICINE

## 2022-12-12 PROCEDURE — 93306 TTE W/DOPPLER COMPLETE: CPT

## 2022-12-12 PROCEDURE — 93970 EXTREMITY STUDY: CPT

## 2022-12-12 PROCEDURE — 80061 LIPID PANEL: CPT | Performed by: FAMILY MEDICINE

## 2022-12-12 RX ADMIN — ACETAMINOPHEN 650 MG: 325 TABLET, FILM COATED ORAL at 20:33

## 2022-12-12 RX ADMIN — APIXABAN 10 MG: 5 TABLET, FILM COATED ORAL at 10:15

## 2022-12-12 RX ADMIN — ENOXAPARIN SODIUM 90 MG: 100 INJECTION SUBCUTANEOUS at 00:08

## 2022-12-12 RX ADMIN — Medication 10 ML: at 08:24

## 2022-12-12 RX ADMIN — Medication 10 ML: at 21:38

## 2022-12-12 RX ADMIN — APIXABAN 10 MG: 5 TABLET, FILM COATED ORAL at 21:38

## 2022-12-12 RX ADMIN — SERTRALINE HYDROCHLORIDE 50 MG: 50 TABLET, FILM COATED ORAL at 08:24

## 2022-12-12 NOTE — PROGRESS NOTES
LOS: 1 day   Patient Care Team:  Cesilia Benítez APRN as PCP - General (Nurse Practitioner)    Chief Complaint: Admitted with pulmonary embolism     Abdirahman Harris is a 32 y.o. female who is being seen  Overnight feels well  No chest pain  No palpitation  No presyncope  No syncope  No orthopnea  No paroxysmal nocturnal dyspnea  Hemodynamically stable  Labs reviewed  No new issues or events  Tolerating current medications well  Satisfactory bowel and bladder activity  Satisfactory oral intake  Resting well  Heart rates are completely normal and maintaining sinus rhythm  Patient is not tachypneic  Saturating normally    Telemetry: no malignant arrhythmia. No significant pauses.    Review of Systems   Constitutional: No chills   Has fatigue   No fever.   HENT: Negative.    Eyes: Negative.    Respiratory: Negative for cough,   No chest wall soreness,   Shortness of breath,   no wheezing, no stridor.    Cardiovascular: As above  Gastrointestinal: Negative for abdominal distention,  No abdominal pain,   No blood in stool,   No constipation,   No diarrhea,   No nausea   No vomiting.   Endocrine: Negative.    Genitourinary: Negative for difficulty urinating, dysuria, flank pain and hematuria.   Musculoskeletal: Negative.    Skin: Negative for rash and wound.   Allergic/Immunologic: Negative.    Neurological: Negative for dizziness, syncope, weakness,   No light-headedness  No  headaches.   Hematological: Does not bruise/bleed easily.   Psychiatric/Behavioral: Negative for agitation or behavioral problems,   No confusion,   the patient is  nervous/anxious.       History:   Past Medical History:   Diagnosis Date   • Anemia    • Chronic kidney disease     stones   • Depression 2022   • Kidney stone     ESWL in 2016   • Migraine     Stress related   • Ovarian cyst      Past Surgical History:   Procedure Laterality Date   •  SECTION N/A 10/02/2017    Procedure:  SECTION PRIMARY;   Surgeon: Cecily Riggs MD;  Location: [facility] LABOR DELIVERY;  Service:    •  SECTION N/A 2020    Procedure: REPEAT  SECTION WITHOUT TUBAL LIGATION;  Surgeon: Cecily Riggs MD;  Location: [facility] LABOR DELIVERY;  Service: Obstetrics/Gynecology;  Laterality: N/A;   • CLEFT LIP REPAIR     • CLEFT PALATE REPAIR      8 surgeries   • COSMETIC SURGERY      Cleft lip repair [id number]   • HAND OPEN REDUCTION INTERNAL FIXATION     • KNEE ARTHROSCOPY MENISCUS TRANSPLANT Right    • KNEE ARTHROSCOPY W/ MENISCAL REPAIR Left    • KNEE SURGERY      piece of femur broke off into knee     Social History     Socioeconomic History   • Marital status:    Tobacco Use   • Smoking status: Never   • Smokeless tobacco: Never   Vaping Use   • Vaping Use: Never used   Substance and Sexual Activity   • Alcohol use: No   • Drug use: No   • Sexual activity: Yes     Partners: Male     Birth control/protection: None     Family History   Problem Relation Age of Onset   • Hypertension Father    • Alcohol abuse Father    • Birth defects Father         Bilateral Cleft Lip   • Prostate cancer Paternal Grandfather    • Breast cancer Paternal Grandmother    • Melanoma Paternal Grandmother    • Cancer Paternal Grandmother         Breast, Skin, and Pancreatic cancers   • Depression Mother    • Thyroid disease Mother    • Cancer Maternal Grandfather         Prostate cancer       Labs:  WBC WBC   Date Value Ref Range Status   2022 6.94 3.40 - 10.80 10*3/mm3 Final      HGB Hemoglobin   Date Value Ref Range Status   2022 12.3 12.0 - 15.9 g/dL Final      HCT Hematocrit   Date Value Ref Range Status   2022 39.1 34.0 - 46.6 % Final      Platelets Platelets   Date Value Ref Range Status   2022 197 140 - 450 10*3/mm3 Final      MCV MCV   Date Value Ref Range Status   2022 89.7 79.0 - 97.0 fL Final        Results from last 7 days   Lab Units 22  0210   SODIUM mmol/L 139   POTASSIUM  "mmol/L 3.7   CHLORIDE mmol/L 104   CO2 mmol/L 25.0   BUN mg/dL 17   CREATININE mg/dL 0.64   CALCIUM mg/dL 9.2   BILIRUBIN mg/dL 0.5   ALK PHOS U/L 76   ALT (SGPT) U/L 18   AST (SGOT) U/L 18   GLUCOSE mg/dL 97     Lab Results   Component Value Date    TROPONINT <0.010 12/11/2022     PT/INR:  No results found for: PROTIME/No results found for: INR    Imaging Results (Last 72 Hours)     ** No results found for the last 72 hours. **          Objective     Allergies   Allergen Reactions   • Amoxicillin      reaction as a baby, pt told by mother not to take amoxicillin.   • Imitrex [Sumatriptan] Nausea And Vomiting and Nausea Only   • Penicillins Other (See Comments)     Makes her crazy - mean.        Medication Review: Performed  Current Facility-Administered Medications   Medication Dose Route Frequency Provider Last Rate Last Admin   • acetaminophen (TYLENOL) tablet 650 mg  650 mg Oral Q6H PRN Tunde Treadwell MD   650 mg at 12/11/22 1849   • albuterol (PROVENTIL) nebulizer solution 0.083% 2.5 mg/3mL  2.5 mg Nebulization Q4H PRN Tunde Treadwell MD       • Enoxaparin Sodium (LOVENOX) syringe 90 mg  1 mg/kg Subcutaneous Q12H Tunde Treadwell MD   90 mg at 12/12/22 0008   • sertraline (ZOLOFT) tablet 50 mg  50 mg Oral Daily Tunde Treadwell MD   50 mg at 12/12/22 0824   • sodium chloride 0.9 % flush 10 mL  10 mL Intravenous Q12H Tunde Treadwell MD   10 mL at 12/12/22 0824   • sodium chloride 0.9 % flush 10 mL  10 mL Intravenous PRN Tunde Treadwell MD       • sodium chloride 0.9 % infusion 40 mL  40 mL Intravenous PRN Tunde Treadwell MD           Vital Sign Min/Max for last 24 hours  Temp  Min: 97.7 °F (36.5 °C)  Max: 98.6 °F (37 °C)   BP  Min: 92/60  Max: 125/77   Pulse  Min: 70  Max: 95   Resp  Min: 12  Max: 15   SpO2  Min: 93 %  Max: 100 %   No data recorded   Weight  Min: 91.4 kg (201 lb 8 oz)  Max: 91.4 kg (201 lb 8 oz)     Flowsheet Rows    Flowsheet Row First Filed Value   Admission Height 170.2 cm (67\") Documented at " 12/11/2022 1610   Admission Weight 91.4 kg (201 lb 8 oz) Documented at 12/11/2022 1610              Physical Exam:    General Appearance: Awake, alert, in no acute distress  Eyes: Pupils equal and reactive    Ears: Appear intact with no abnormalities noted  Nose: Nares normal, no drainage  Neck: supple, trachea midline, no carotid bruit and no JVD  Back: no kyphosis present,    Lungs: respirations regular, respirations even and respirations unlabored  Heart: normal S1, S2, no significant murmurs   No gallops or rubs  no rub and no click  Abdomen: normal bowel sounds, no tenderness   Skin: no bleeding, bruising or rash  Extremities: no cyanosis  Psychiatric/Behavioral: Negative for agitation, behavioral problems, confusion, the patient does  appear to be nervous/anxious.       Results Review:   I reviewed the patient's new clinical results.  I reviewed the patient's new imaging results and agree with the interpretation.  I reviewed the patient's other test results and agree with the interpretation  I personally viewed and interpreted the patient's EKG/Telemetry data    Discussed with patient  Updated patient regarding any new or relevant abnormalities on review of records or any new findings on physical exam.   Mentioned to patient about purpose of visit and desirable health short and long term goals and objectives.     Reviewed available prior notes, consults, prior visits, laboratory findings, radiology and cardiology relevant reports.   Updated chart as applicable.   I have reviewed the patient's medical history in detail and updated the computerized patient record as relevant.          Assessment & Plan       Saddle embolus of pulmonary artery (HCC)    Pulmonary embolism with acute cor pulmonale (HCC)  Recent left knee meniscus surgery  History of oral contraceptive use    Plan    Check results of echocardiogram  Continue on anticoagulation  Overall clinically improved  Saturating well  Heart rates are completely  normal  Patient asymptomatic  Avoid all medications that can increase prothrombotic state    Telemetry  Deep vein thrombosis prophylaxis/precautions [on anticoagulation with Lovenox]  Appropriate diet, fluid, sodium, caffeine, stimulants intake   Questions were encouraged, asked and answered to the patient's  understanding and satisfaction.  Compliance to diet and medications       Aneesh Brock MD  12/12/22  08:54 CST    EMR Dragon/Transcription was used to dictate part of this note

## 2022-12-12 NOTE — PLAN OF CARE
Goal Outcome Evaluation:  Plan of Care Reviewed With: patient        Progress: no change  Outcome Evaluation: Pt A&Ox4. Breath sounds clear. Sats great on room air. Slept through night. NSR. No acute changes. VSS. All safety measures maintained.

## 2022-12-12 NOTE — PLAN OF CARE
Goal Outcome Evaluation:  Plan of Care Reviewed With: patient           Outcome Evaluation: Pt admitted from CCU to room 440, VSS, on RA, no c/o SOB or chest pain, does c/o of pain behind left knee and down to calf, left knee incision c/d/i, states very mild pain when lying but becomes much increased when flexes foot or walks on leg, down for venous scan which was positive for DVT to left leg, Dr. Garcia aware, pt currently on eliquis, pt made aware that md would like her to wear compression stockings once dc'd home tomorrow, HR S 78-91

## 2022-12-12 NOTE — PLAN OF CARE
Goal Outcome Evaluation:  Plan of Care Reviewed With: patient        Progress: no change  Outcome Evaluation: Patient was transferred from [facility] ER this afternoon. Lung sounds clear and SpO2 stable on 2L NC. Complains of chest pressure 3/10 with mild shortness of breath that is improved from earlier today. NPO at midnight per cardiology.

## 2022-12-12 NOTE — PROGRESS NOTES
[facility] Medicine Services  INPATIENT PROGRESS NOTE    Patient Name: Adina Harris  Date of Admission: 12/11/2022  Today's Date: 12/12/22  Length of Stay: 1  Primary Care Physician: Cesilia Benítez APRN    Subjective   Chief Complaint: follow-up chest pain  HPI     Patient was seen and examined at bedside.  The patient indicates that she is no longer having chest pain or shortness of breath.  She feels a little bit of heaviness with deep breaths.  Patient continues to have positive Homans' sign on the left side, anytime that she moves her ankle or calf she has significant pain down there.  Patient is on room air.  Heart rate has stabilized.        Review of Systems   Constitutional: Negative for chills and fever.   Respiratory: Negative for cough and shortness of breath.    Musculoskeletal: Positive for arthralgias and myalgias.        Calf pain left knee pain          All pertinent negatives and positives are as above. All other systems have been reviewed and are negative unless otherwise stated.     Objective    Temp:  [97.7 °F (36.5 °C)-98.6 °F (37 °C)] 97.7 °F (36.5 °C)  Heart Rate:  [70-95] 76  Resp:  [12-15] 15  BP: ()/(60-82) 121/79  Physical Exam  Vitals and nursing note reviewed.   Constitutional:       Appearance: Normal appearance.   HENT:      Head: Normocephalic and atraumatic.      Mouth/Throat:      Mouth: Mucous membranes are dry.      Pharynx: Oropharynx is clear.   Eyes:      General: No scleral icterus.  Cardiovascular:      Rate and Rhythm: Normal rate and regular rhythm.   Pulmonary:      Effort: No respiratory distress.      Breath sounds: Normal breath sounds. No stridor.   Abdominal:      General: Abdomen is flat. Bowel sounds are normal. There is no distension.      Palpations: Abdomen is soft. There is no mass.      Tenderness: There is no abdominal tenderness.      Hernia: No hernia is present.   Musculoskeletal:      Comments:  Positive Homans' sign on the left   Skin:     General: Skin is warm and dry.      Coloration: Skin is pale.   Neurological:      General: No focal deficit present.      Mental Status: She is alert and oriented to person, place, and time.   Psychiatric:         Mood and Affect: Mood normal.         Behavior: Behavior normal.             Results Review:  I have reviewed the labs, radiology results, and diagnostic studies.    Laboratory Data:   Results from last 7 days   Lab Units 12/12/22  0211   WBC 10*3/mm3 6.94   HEMOGLOBIN g/dL 12.3   HEMATOCRIT % 39.1   PLATELETS 10*3/mm3 197        Results from last 7 days   Lab Units 12/12/22  0210   SODIUM mmol/L 139   POTASSIUM mmol/L 3.7   CHLORIDE mmol/L 104   CO2 mmol/L 25.0   BUN mg/dL 17   CREATININE mg/dL 0.64   CALCIUM mg/dL 9.2   BILIRUBIN mg/dL 0.5   ALK PHOS U/L 76   ALT (SGPT) U/L 18   AST (SGOT) U/L 18   GLUCOSE mg/dL 97       Culture Data:   No results found for: BLOODCX, URINECX, WOUNDCX, MRSACX, RESPCX, STOOLCX    Radiology Data:   Imaging Results (Last 24 Hours)     ** No results found for the last 24 hours. **          I have reviewed the patient's current medications.     Assessment/Plan     Active Hospital Problems    Diagnosis    • **Saddle embolus of pulmonary artery (HCC)    • Obesity (BMI 30-39.9)    • Uses birth control    • Pulmonary embolism with acute cor pulmonale (HCC)        Plan:    Patient is noted to have an elevated BNP and an elevated high-sensitivity troponin at the outside facility.  This is a submassive intermediate-high risk pulmonary embolism based on the PERT model.  Cardiology was consulted for consideration of catheter-based therapy, ultimately since the patient is clinically doing so well the decision was made to treat with anticoagulation only.  Patient was initially treated with Lovenox, we will transition over to Eliquis today.      The PERT Concept.  Roseann Santo. et al.  CHEST, Volume 159, Issue 1, 347 -  355    Results for orders placed during the hospital encounter of 12/11/22    Adult Transthoracic Echo Complete W/ Cont if Necessary Per Protocol    Interpretation Summary  •  Left ventricular systolic function is normal. Left ventricular ejection fraction appears to be 56 - 60%.  •  Left ventricular diastolic function was normal.  •  Estimated right ventricular systolic pressure from tricuspid regurgitation is normal (<35 mmHg).  •  Abnormal global longitudinal LV strain (GLS) = -14.0%.  •  Normal right ventricular cavity size, wall thickness, systolic function and septal motion noted.  •  No evidence of RV strain    Discussed case with Dr. Brock.  Patient is okay to floor and transition to p.o. anticoagulation.    CT scan from the outside hospital was reviewed, bilateral pulmonary embolism, mild RV enlargement.    This is considered a provoked pulmonary embolism, patient recently had influenza, had knee surgery, and is on hormonal birth control.  Recommended at least 6 months of anticoagulation, in cases like hers where she is very low risk of bleeding I sometimes recommend doing a full year of anticoagulation.  Recommended finding alternative forms of birth control, they are discussing the  have a vasectomy.            Discharge Planning: I expect the patient to be discharged to home in 1 days.    Electronically signed by Indra Garcia MD, 12/12/22, 09:35 CST.

## 2022-12-12 NOTE — PROGRESS NOTES
Assessment tool to be used for patients with existing breathing treatments ordered by hospitalist                               Respiratory Therapist Driven Protocol - RT to Assess and Treat Algorithm    Item 0 Points 1 Point 2 Points 3 Points 4 Points Subtotal   Mental Status Alert, orientated, cooperative Lethargic, follows commands Confused, not following commands Obtunded or Somnolent Comatose 0   Respiratory Pattern Regular RR  8-16 breaths/minute Increased RR  18-25 breaths/minute Dyspnea on exertion, irregular RR  26-30/minute Shortness of breath,  RR 31-35/minute Accessory muscle use, severe SOB  RR > 35/minute 1   Breath Sounds Clear Decreased unilaterally Decreased bilaterally Basilar crackles Wheezing and/or rhonchi 0   Cough Strong, spontaneous non-productive Strong productive Weak, non-productive Weak productive or weak with rhonchi Absent or may require suctioning 0   Pulmonary Status Nonsmoker or no previous history > 1 year quit < 1 PPD  < 1 year quit >  or = 1 PPD Diagnosed pulmonary disease (severe or chronic) Severe or chronic pulmonary disease with exacerbation 0   Surgical Status None General surgery (non-abdominal or non-thoracic) Lower abdominal Thoracic or upper abdominal Thoracic with pulmonary disease 1   Chest X-ray Clear Chronic changes Infiltrates, atelectasis or pleural effusion Infiltrates > 1 lobe Diffuse infiltrates and atelectasis and/or effusions 0   Activity level Ambulatory Ambulatory with assistance Non-ambulatory Paraplegic Quadriplegic 0                     Total Score 2   Score    Drug Therapy Frequency  20 or >    Q4 Duoneb & Q3 Albuterol PRN 15 - 19     Q6 Duoneb & Q4 Albuterol PRN 10 - 14    QID Duoneb & Q4 Albuterol PRN 5 - 9    TID Duoneb & Q6 Albuterol PRN 0 - 4    Q4 PRN Duoneb or Q4 PRN Albuterol    Incentive Spirometry - Initial RT instruct    Lung Expansion Therapy (PEP) Bronchopulmonary Hygiene (CPT)   Q4 & PRN - Severe atelectasis, poor  oxygenation Q4 - copious secretions, dyspnea, unable to sleep, mucus plugging   QID - High risk for persistent atelectasis, existence of atelectasis QID & Q4 PRN - Moderate secretion production   TID - At risk for developing atelectasis TID - small amounts of secretions with poor cough   BID - prevention of atelectasis BID - unable to breathe deeply and cough spontaneously   *RT Protocol patients will be re-assessed/re-evaluated every 48 hours.    *Patients who are home nebulizer treatments will be protocoled to no less than their home regimen and will remain     on their home regimen with re-evaluations as needed with changes in patient condition.    RT Comments/Recommendations: Q4 PRN albuterol

## 2022-12-13 ENCOUNTER — READMISSION MANAGEMENT (OUTPATIENT)
Dept: CALL CENTER | Facility: HOSPITAL | Age: 32
End: 2022-12-13

## 2022-12-13 VITALS
DIASTOLIC BLOOD PRESSURE: 59 MMHG | HEART RATE: 80 BPM | WEIGHT: 197.2 LBS | SYSTOLIC BLOOD PRESSURE: 99 MMHG | RESPIRATION RATE: 16 BRPM | OXYGEN SATURATION: 94 % | HEIGHT: 67 IN | BODY MASS INDEX: 30.95 KG/M2 | TEMPERATURE: 97.9 F

## 2022-12-13 LAB
ALBUMIN SERPL-MCNC: 3.8 G/DL (ref 3.5–5.2)
ALBUMIN/GLOB SERPL: 1.1 G/DL
ALP SERPL-CCNC: 82 U/L (ref 39–117)
ALT SERPL W P-5'-P-CCNC: 31 U/L (ref 1–33)
ANION GAP SERPL CALCULATED.3IONS-SCNC: 10 MMOL/L (ref 5–15)
AST SERPL-CCNC: 37 U/L (ref 1–32)
BILIRUB SERPL-MCNC: 0.5 MG/DL (ref 0–1.2)
BUN SERPL-MCNC: 15 MG/DL (ref 6–20)
BUN/CREAT SERPL: 23.1 (ref 7–25)
CALCIUM SPEC-SCNC: 9.1 MG/DL (ref 8.6–10.5)
CHLORIDE SERPL-SCNC: 105 MMOL/L (ref 98–107)
CO2 SERPL-SCNC: 24 MMOL/L (ref 22–29)
CREAT SERPL-MCNC: 0.65 MG/DL (ref 0.57–1)
DEPRECATED RDW RBC AUTO: 40.7 FL (ref 37–54)
EGFRCR SERPLBLD CKD-EPI 2021: 121.6 ML/MIN/1.73
ERYTHROCYTE [DISTWIDTH] IN BLOOD BY AUTOMATED COUNT: 12.5 % (ref 12.3–15.4)
GLOBULIN UR ELPH-MCNC: 3.4 GM/DL
GLUCOSE SERPL-MCNC: 120 MG/DL (ref 65–99)
HCT VFR BLD AUTO: 38.2 % (ref 34–46.6)
HGB BLD-MCNC: 12.4 G/DL (ref 12–15.9)
MCH RBC QN AUTO: 28.5 PG (ref 26.6–33)
MCHC RBC AUTO-ENTMCNC: 32.5 G/DL (ref 31.5–35.7)
MCV RBC AUTO: 87.8 FL (ref 79–97)
PLATELET # BLD AUTO: 209 10*3/MM3 (ref 140–450)
PMV BLD AUTO: 10.4 FL (ref 6–12)
POTASSIUM SERPL-SCNC: 4.2 MMOL/L (ref 3.5–5.2)
PROT SERPL-MCNC: 7.2 G/DL (ref 6–8.5)
RBC # BLD AUTO: 4.35 10*6/MM3 (ref 3.77–5.28)
SODIUM SERPL-SCNC: 139 MMOL/L (ref 136–145)
WBC NRBC COR # BLD: 6.47 10*3/MM3 (ref 3.4–10.8)

## 2022-12-13 PROCEDURE — 80053 COMPREHEN METABOLIC PANEL: CPT | Performed by: INTERNAL MEDICINE

## 2022-12-13 PROCEDURE — 85027 COMPLETE CBC AUTOMATED: CPT | Performed by: INTERNAL MEDICINE

## 2022-12-13 RX ADMIN — SERTRALINE HYDROCHLORIDE 50 MG: 50 TABLET, FILM COATED ORAL at 08:28

## 2022-12-13 RX ADMIN — APIXABAN 10 MG: 5 TABLET, FILM COATED ORAL at 08:28

## 2022-12-13 RX ADMIN — Medication 10 ML: at 08:29

## 2022-12-13 NOTE — PLAN OF CARE
Goal Outcome Evaluation:  Plan of Care Reviewed With: patient, spouse        Progress: improving  Outcome Evaluation: VSS, BP a little soft through the night. C/o pain managed with PRN tylenol. Treatment plan discussed in depth with pt and spouse. Educational sheets printed from clinical references for pt. S/ST  on tele. Pt ambulating in mendez with . Pt safety was maintained.

## 2022-12-13 NOTE — DISCHARGE SUMMARY
[facility] Medicine Services  DISCHARGE SUMMARY       Date of Admission: 12/11/2022  Date of Discharge:  12/13/2022  Primary Care Physician: Cesilia Benítez APRN    Presenting Problem/History of Present Illness:  Chest pain shortness of breath    Final Discharge Diagnoses:  Active Hospital Problems    Diagnosis    • **Saddle embolus of pulmonary artery (HCC)    • Obesity (BMI 30-39.9)    • Uses birth control    • Pulmonary embolism with acute cor pulmonale (HCC)        Consults: Cardiology    Procedures Performed: None    Pertinent Test Results:   Results for orders placed during the hospital encounter of 12/11/22    Adult Transthoracic Echo Complete W/ Cont if Necessary Per Protocol    Interpretation Summary  •  Left ventricular systolic function is normal. Left ventricular ejection fraction appears to be 56 - 60%.  •  Left ventricular diastolic function was normal.  •  Estimated right ventricular systolic pressure from tricuspid regurgitation is normal (<35 mmHg).  •  Abnormal global longitudinal LV strain (GLS) = -14.0%.  •  Normal right ventricular cavity size, wall thickness, systolic function and septal motion noted.  •  No evidence of RV strain      Imaging Results (All)     Procedure Component Value Units Date/Time    US Venous Doppler Lower Extremity Bilateral (duplex) [[id number]] Resulted: 12/12/22 1347     Updated: 12/12/22 1411        LAB RESULTS:      Lab 12/13/22  0611 12/12/22 0211   WBC 6.47 6.94   HEMOGLOBIN 12.4 12.3   HEMATOCRIT 38.2 39.1   PLATELETS 209 197   NEUTROS ABS  --  3.65   IMMATURE GRANS (ABS)  --  0.02   LYMPHS ABS  --  2.75   MONOS ABS  --  0.42   EOS ABS  --  0.08   MCV 87.8 89.7         Lab 12/13/22  0558 12/12/22  0211 12/12/22  0210   SODIUM 139  --  139   POTASSIUM 4.2  --  3.7   CHLORIDE 105  --  104   CO2 24.0  --  25.0   ANION GAP 10.0  --  10.0   BUN 15  --  17   CREATININE 0.65  --  0.64   EGFR 121.6  --  122.1   GLUCOSE 120*   --  97   CALCIUM 9.1  --  9.2   HEMOGLOBIN A1C  --  5.10  --    TSH  --   --  0.915         Lab 12/13/22  0558 12/12/22  0210   TOTAL PROTEIN 7.2 7.2   ALBUMIN 3.80 4.00   GLOBULIN 3.4 3.2   ALT (SGPT) 31 18   AST (SGOT) 37* 18   BILIRUBIN 0.5 0.5   ALK PHOS 82 76         Lab 12/11/22  1724   PROBNP 1,197.0*   TROPONIN T <0.010         Lab 12/12/22  0210   CHOLESTEROL 214*   LDL CHOL 143*   HDL CHOL 47   TRIGLYCERIDES 135             Brief Urine Lab Results  (Last result in the past 365 days)      Color   Clarity   Blood   Leuk Est   Nitrite   Protein   CREAT   Urine HCG        08/05/22 0857               Negative           Microbiology Results (last 10 days)     ** No results found for the last 240 hours. **          Hospital Course:   Patient presented as a transfer from [facility].  Patient had recently had influenza at the end of November, had recent meniscal surgery on her left knee.  Patient is also noted to be on chronic birth control.  Patient presented there with severe sudden onset sharp chest pain.  Patient was found to have bilateral pulmonary emboli.  Patient had elevated high-sensitivity troponin at the outside facility and an elevated BNP at our facility.  This classifies her as a submassive intermediate-high risk pulmonary pulmonary embolism based on the PERC model.  Cardiology was consulted for consideration of catheter-based therapy.  Ultimately decision was made to treat with anticoagulation only given the patient's status of being on room air and symptomatically improved upon arrival here.  Patient was initially started on Lovenox and then subsequently transitioned over to Eliquis.  Patient be discharged with an Eliquis starter pack.  Primary care doctor will be responsible for providing continued anticoagulation going forward.  Recommended to the patient that she find alternative sources of birth control, the plan is for her  to get a vasectomy.  The patient also recommended  "to wear compression stockings to reduce the risk of post thrombotic syndrome.  Avoid NSAIDs.  Recommend anticoagulation for at least 6 months, given her relatively low risk of bleeding and young age, I would recommend consideration of doing a full year of anticoagulation.    US lower ext preliminary showed: Thrombus visualized in the LT distal Pop V, PTVs, Peroneal Vs, and ATV    Physical Exam on Discharge:  BP 99/59 (BP Location: Left arm, Patient Position: Lying)   Pulse 80   Temp 97.9 °F (36.6 °C) (Oral)   Resp 16   Ht 170.2 cm (67\")   Wt 89.4 kg (197 lb 3.2 oz)   SpO2 94%   BMI 30.89 kg/m²   Physical Exam  Vitals and nursing note reviewed.   Constitutional:       Appearance: Normal appearance.   HENT:      Head: Normocephalic and atraumatic.      Mouth/Throat:      Mouth: Mucous membranes are moist     Pharynx: Oropharynx is clear.   Eyes:      General: No scleral icterus.  Cardiovascular:      Rate and Rhythm: Normal rate and regular rhythm.   Pulmonary:      Effort: No respiratory distress.      Breath sounds: Normal breath sounds. No stridor.   Abdominal:      General: Abdomen is flat. Bowel sounds are normal. There is no distension.      Palpations: Abdomen is soft. There is no mass.      Tenderness: There is no abdominal tenderness.      Hernia: No hernia is present.   Musculoskeletal:      Comments: Positive Homans' sign on the left   Skin:     General: Skin is warm and dry.      Coloration: Skin is pale.   Neurological:      General: No focal deficit present.      Mental Status: She is alert and oriented to person, place, and time.   Psychiatric:         Mood and Affect: Mood normal.         Behavior: Behavior normal.        Condition on Discharge: Stable    Discharge Disposition:  Home or Self Care    Discharge Medications:     Discharge Medications      New Medications      Instructions Start Date   Apixaban Starter Pack tablet therapy pack   5 mg, Oral, Take As Directed         Continue These " Medications      Instructions Start Date   sertraline 50 MG tablet  Commonly known as: ZOLOFT   50 mg, Oral, Daily             Discharge Diet:   Diet Instructions     Diet: Regular; Thin      Discharge Diet: Regular    Fluid Consistency: Thin          Activity at Discharge:   Activity Instructions     Activity as Tolerated            Follow-up Appointments:   Future Appointments   Date Time Provider Department Center   12/22/2022  9:30 AM Cesilia Benítez APRN [facility] [place] [facility]       Test Results Pending at Discharge: None    Electronically signed by Indra Garcia MD, 12/13/22, 08:23 CST.    Time: 35 minutes.

## 2022-12-14 ENCOUNTER — TRANSITIONAL CARE MANAGEMENT TELEPHONE ENCOUNTER (OUTPATIENT)
Dept: CALL CENTER | Facility: HOSPITAL | Age: 32
End: 2022-12-14

## 2022-12-14 NOTE — OUTREACH NOTE
Prep Survey    Flowsheet Row Responses   [facility] facility patient discharged from? [place]   Is LACE score < 7 ? Yes   Eligibility [facility]   Date of Admission 12/11/22   Date of Discharge 12/13/22   Discharge Disposition Home or Self Care   Discharge diagnosis Saddle embolus of pulmonary artery   Does the patient have one of the following disease processes/diagnoses(primary or secondary)? Other   Does the patient have Home health ordered? No   Is there a DME ordered? No   Prep survey completed? Yes          ALESSIO COPELAND - Registered Nurse

## 2022-12-14 NOTE — OUTREACH NOTE
Call Center TCM Note    Flowsheet Row Responses   [facility] patient discharged from? [place]   Does the patient have one of the following disease processes/diagnoses(primary or secondary)? Other   TCM attempt successful? No   Unsuccessful attempts Attempt 1          Aleida Moore RN    12/14/2022, 15:14 CST

## 2022-12-14 NOTE — OUTREACH NOTE
Call Center TCM Note    Flowsheet Row Responses   [facility] patient discharged from? [place]   Does the patient have one of the following disease processes/diagnoses(primary or secondary)? Other   TCM attempt successful? Yes   Call start time 1617   Call end time 1619   Discharge diagnosis Saddle embolus of pulmonary artery   Meds reviewed with patient/caregiver? Yes   Is the patient having any side effects they believe may be caused by any medication additions or changes? No   Does the patient have all medications ordered at discharge? Yes   Is the patient taking all medications as directed (includes completed medication regime)? Yes   Comments Hospital f/u with PCP on 12/22   Does the patient have an appointment with their PCP within 7 days of discharge? Yes   Has home health visited the patient within 72 hours of discharge? N/A   Psychosocial issues? No   Did the patient receive a copy of their discharge instructions? Yes   What is the patient's perception of their health status since discharge? Improving   Is the patient/caregiver able to teach back the hierarchy of who to call/visit for symptoms/problems? PCP, Specialist, Home health nurse, Urgent Care, ED, 911 Yes   TCM call completed? Yes   Wrap up additional comments Doing well, no questions, confirmed appt with PCP for 12/22.   Call end time 1619   Would this patient benefit from a Referral to Amb Social Work? No   Is the patient interested in additional calls from an ambulatory ?  NOTE:  applies to high risk patients requiring additional follow-up. No          Aleida Moore RN    12/14/2022, 16:19 CST

## 2022-12-22 ENCOUNTER — OFFICE VISIT (OUTPATIENT)
Dept: INTERNAL MEDICINE | Facility: CLINIC | Age: 32
End: 2022-12-22

## 2022-12-22 VITALS
HEART RATE: 73 BPM | HEIGHT: 67 IN | RESPIRATION RATE: 17 BRPM | DIASTOLIC BLOOD PRESSURE: 72 MMHG | OXYGEN SATURATION: 98 % | BODY MASS INDEX: 31.08 KG/M2 | WEIGHT: 198 LBS | TEMPERATURE: 98.3 F | SYSTOLIC BLOOD PRESSURE: 106 MMHG

## 2022-12-22 DIAGNOSIS — I26.92 ACUTE SADDLE PULMONARY EMBOLISM, UNSPECIFIED WHETHER ACUTE COR PULMONALE PRESENT: Primary | ICD-10-CM

## 2022-12-22 PROCEDURE — 99495 TRANSJ CARE MGMT MOD F2F 14D: CPT | Performed by: NURSE PRACTITIONER

## 2022-12-22 RX ORDER — HYDROCODONE BITARTRATE AND ACETAMINOPHEN 5; 325 MG/1; MG/1
TABLET ORAL
COMMUNITY
Start: 2022-12-08

## 2022-12-22 RX ORDER — ONDANSETRON 4 MG/1
TABLET, FILM COATED ORAL
COMMUNITY
Start: 2022-12-08

## 2022-12-22 NOTE — PROGRESS NOTES
Chief Complaint  Transitional Care Management (D/C 12-)    Subjective        Adina Harris presents to [facility]  History of Present Illness      Transitional Care Management  The patient presents today for a hospitalization follow-up. She was discharged on 12/13/2022. She had bilateral saddle pulmonary emboli, assuming this was from surgery with the conglomeration of birth control as well. They were considering this a provoked pulmonary embolism. She was admitted into the hospital on 12/11/2022 and discharged on 12/13/2022. They were unable to do the echocardiogram because of the recent leg surgery. They initially thought that the right side of the heart was pretty strange, but when they did her echocardiogram of the heart, it actually looked okay. She had pretty significant chest pain and shortness of breath, which is what prompted her to go to [facility]. She is accompanied by an adult male.    Hospital Follow Up  The patient states that she feels about 90 percent better. She reports that she still has some chest pressure and some twinges of pain in her heart. She states that her leg is way better after experiencing a deep vein thrombosis from the surgery. She reports that she had one behind her knee and then a little piece of that was still in the knee and then she had one down her calf. She states that her calf feels 100 percent better. She is currently on Eliquis 5 MG. She began experiencing mild pain in her lower extremity immediately after the surgery. She began having pain on 12/16/2022, had her first spell on Saturday night 12/17/2022 and then again Sunday morning 12/18/2022. She states that she was having severe chest pain which affected her ability to get out of bed. She reports that she fell backwards in bed which scared her to the point of vomiting, she also felt a change in the rhythm of her heart beat.    The patient states that she is  "actively on birth control. She reports that she was told to come off of it, so she has not been taking it. She states that she has not had a period coming off of her birth control yet. She reports that she has ovarian cysts. Her  is planning on getting a vasectomy.   Patient's PMR from outside medical facility reviewed and noted.    Objective   Vital Signs:  /72   Pulse 73   Temp 98.3 °F (36.8 °C)   Resp 17   Ht 170.2 cm (67\")   Wt 89.8 kg (198 lb)   SpO2 98%   BMI 31.01 kg/m²   Estimated body mass index is 31.01 kg/m² as calculated from the following:    Height as of this encounter: 170.2 cm (67\").    Weight as of this encounter: 89.8 kg (198 lb).    Physical Exam  Vitals reviewed.   Constitutional:       Appearance: She is well-developed.   HENT:      Head: Normocephalic and atraumatic.      Nose: Nose normal.   Eyes:      General: Lids are normal.      Conjunctiva/sclera: Conjunctivae normal.      Pupils: Pupils are equal, round, and reactive to light.   Cardiovascular:      Rate and Rhythm: Normal rate and regular rhythm.      Pulses: Normal pulses.      Heart sounds: Normal heart sounds.   Pulmonary:      Effort: Pulmonary effort is normal. No respiratory distress.      Breath sounds: Normal breath sounds.   Abdominal:      General: Bowel sounds are normal.      Palpations: Abdomen is soft.   Musculoskeletal:         General: Normal range of motion.      Cervical back: Normal range of motion and neck supple.   Skin:     General: Skin is warm and dry.      Capillary Refill: Capillary refill takes less than 2 seconds.   Neurological:      Mental Status: She is alert and oriented to person, place, and time.   Psychiatric:         Behavior: Behavior normal.         Thought Content: Thought content normal.         Judgment: Judgment normal.        Result Review :    Common labs    Common Labs 11/22/22 12/12/22 12/12/22 12/12/22 12/12/22 12/13/22 12/13/22     0210 0210 0211 0211 [id number] 0611 "   Glucose  97    120 (A)    BUN  17    15    Creatinine  0.64    0.65    Sodium  139    139    Potassium  3.7    4.2    Chloride  104    105    Calcium  9.2    9.1    Albumin  4.00    3.80    Total Bilirubin  0.5    0.5    Alkaline Phosphatase  76    82    AST (SGOT)  18    37 (A)    ALT (SGPT)  18    31    WBC 5.2   6.94   6.47   Hemoglobin 14.5   12.3   12.4   Hematocrit 45.7   39.1   38.2   Platelets 263   197   209   Total Cholesterol   214 (A)       Triglycerides   135       HDL Cholesterol   47       LDL Cholesterol    143 (A)       Hemoglobin A1C     5.10     (A) Abnormal value            Data reviewed: Cardiology studies echo          Assessment and Plan   Diagnoses and all orders for this visit:    1. Acute saddle pulmonary embolism, unspecified whether acute cor pulmonale present (HCC) (Primary)  Assessment & Plan:  1. Bilateral saddle pulmonary emboli  - The patient will continue on Eliquis.  - She will continue to wear compression socks.  - If she experiences increased bleeding we will check her hemoglobin for further evaluation.  - Refrain from taking ibuprofen    Orders:  -     apixaban (ELIQUIS) 5 MG tablet tablet; Take 1 tablet by mouth 2 (Two) Times a Day.  Dispense: 60 tablet; Refill: 6             No follow-ups on file.  Patient was given instructions and counseling regarding her condition or for health maintenance advice. Please see specific information pulled into the AVS if appropriate.     Transcribed from ambient dictation for YULIANA Wills by Brett Escobedo.  12/22/22   13:13 CST    Patient or patient representative verbalized consent to the visit recording.  I have personally performed the services described in this document as transcribed by the above individual, and it is both accurate and complete.  YULIANA Wills  12/22/2022  14:06 CST

## 2022-12-22 NOTE — ASSESSMENT & PLAN NOTE
1. Bilateral saddle pulmonary emboli  - The patient will continue on Eliquis.  - She will continue to wear compression socks.  - If she experiences increased bleeding we will check her hemoglobin for further evaluation.  - Refrain from taking ibuprofen

## 2023-06-27 PROBLEM — Q68.6 DISCOID LATERAL MENISCUS OF LEFT KNEE: Status: ACTIVE | Noted: 2022-12-06

## 2023-11-26 ENCOUNTER — E-VISIT (OUTPATIENT)
Dept: FAMILY MEDICINE CLINIC | Facility: TELEHEALTH | Age: 33
End: 2023-11-26
Payer: COMMERCIAL

## 2023-11-26 NOTE — EXTERNAL PATIENT INSTRUCTIONS
Diagnosis   Viral sinusitis   My name is YULIANA Clarke, and I'm a healthcare provider at [facility]. From your interview, I see that you have viral sinusitis (sinus infection).   To prevent the spread of illness to others, stay home and away from other people as much as possible while you're sick. When you need to be around other people, consider wearing a face mask.   Here are the main things to know about your current symptoms:    A viral sinusitis infection will get better on its own.    You can use the medications I recommend here to help ease your symptoms.   Based on what you told me in your interview, I haven't prescribed any antibiotics. Antibiotics fight bacteria, not viruses. The latest research shows that 90% of sinus infections are caused by viruses. Antibiotics won't help with your viral infection. They could even make you feel worse as they can cause or worsen nausea, diarrhea, and stomach pain. The following factors suggest that a virus is causing your symptoms:    You've had symptoms for less than 10 days. A bacterial infection is suspected when you've had symptoms longer than 10 days without improvement.    You haven't had a fever. Bacterial infections can cause a high fever.   Medications   Non-prescription   Acetaminophen (325mg): Take 2 tablets by mouth every 4 hours as needed for up to 10 days for any fever, pain, or discomfort associated with your condition. Do not exceed 6 doses in a 24-hour period.   Fluticasone (50mcg): Spray 2 times in each nostril daily for nasal symptoms due to allergies or sinusitis. Fluticasone needs to be used every day to be effective. It may take up to a week for the full effects of the medication to be seen. Brands to look for include Flonase.   Mucus Relief ER oral tablet, extended release (600mg): Take 1 tablet by mouth every 12 hours as needed for cough and congestion.    Prescription medications aren't recommended at this time. The latest research  shows they aren't always the best choice for upper respiratory infections like colds or viral sinus infections.   About your diagnosis   Viral sinusitis is an infection of the sinuses, the hollow spaces connected to your nasal passages. These passages swell and block the drainage of fluid or mucus from the nose, causing pain, pressure, and congestion. These are the key things to know about a viral sinus infection:    It usually starts with cold symptoms.    Some medications can lessen your symptoms.    You can spread it to other people.    The 200 different viruses that cause the common cold can also cause a viral sinus infection.   Common symptoms include fever, sneezing, runny nose, congestion, sinus pain and pressure, sore throat, and cough. You may also notice fatigue, body aches, difficulty sleeping, or decreased appetite.   What to expect   You should feel better within 1 to 2 weeks.   When to seek care   Call us at [phone]   with any sudden or unexpected symptoms.    Fever that measures over 103F or continues for more than 3 days.    Your headache worsens.    Swallowing becomes extremely difficult or impossible.    Hoarse voice or loss of voice lasting longer than 2 weeks.    Sinus pain that lasts for more than 10 days, without improvement.    More than 5 episodes of diarrhea in a day.    More than 5 episodes of vomiting in a day.    Coughing up red or bloody mucus.    Severe shortness of breath.    Severe stomach pain.    Symptoms that get better for a few days, and then suddenly get worse.    Severe chest pain   Other treatment    Rest! Your body needs rest to recover and fight infection.    Drink plenty of water to stay hydrated.    Use a clean humidifier or a cool-mist vaporizer in your room at night. Breathing humid air may help you feel better.    Place a warm, moist washcloth over your nose and forehead to help with your sinus pain and pressure.    Try non-prescription saline nasal sprays to  help your nasal symptoms.    Try using a Neti Pot to flush out your stuffy nose and sinuses. Neti Pots are available at any drugstore without a prescription.    Avoid smoke and air pollution. Smoke can make infections worse.   Prevention    Avoid close contact with other people when you're sick.    Cover your mouth and nose when you cough or sneeze. Use a tissue or cough into your elbow. Make sure that used tissues go directly into the trash.    Avoid touching your eyes, nose, or mouth while you're sick.    Wash your hands often, especially after coughing, sneezing, or blowing your nose. If soap and water are not available, use an alcohol-based hand .    If you or someone in your home or workplace is sick, disinfect commonly used items. This includes door handles, tables, computers, remotes, and pens.    Coronavirus (COVID-19) information   Common symptoms of COVID-19 include fever, cough, shortness of breath, fatigue, muscle or body aches, headaches, new loss of sense of taste or smell, sore throat, stuffy or runny nose, nausea or vomiting, and diarrhea. Most people who get COVID-19 have mild symptoms and can rest at home until they get better. Elderly people and those with chronic medical problems may be at risk for more serious complications.   FAQs about the COVID-19 vaccine   There are four authorized COVID-19 vaccines: Gagan & Gagan's Harriett Vaccine (J&J/Harriett), Moderna, Novavax, and Pfizer-payworks (Pfizer). The J&J/Harriett and Novavax vaccines are approved for use in people aged 18 and older. The Moderna and Pfizer vaccines are approved for those aged 6 months and older. All four are available at no cost. Even if you don't have health insurance, you can still get the COVID-19 vaccine for free.   Which vaccine is the best? Which vaccine should I get?   All four vaccines are highly effective. Even if you get COVID-19 after being vaccinated, all of the vaccines help prevent severe disease,  hospitalization, and complications.   Most people should get whichever vaccine is first available to them. However, women younger than 50 years old should consider the rare risk of blood clots with low platelets after vaccination with the J&J/[employer]et Payment vaccine. This risk hasn't been seen with the other three vaccines.   Are the vaccines safe?   Yes. Hundreds of millions of people in the [place] have already safely received COVID-19 vaccines under the most intense safety monitoring in the history of the [place].   Do I need the vaccine if I've already had COVID?   Yes. Vaccination helps protect you even if you've already had COVID.   If you had COVID-19 and had symptoms, wait to get vaccinated until you've recovered and completed your isolation period.   If you tested positive for COVID-19 but did not have symptoms, you can get vaccinated after 5 full days have passed since you had a positive test, as long as you don't develop symptoms.   How many doses of the vaccine do I need?   Visit www.cdc.gov/coronavirus/2019-ncov/vaccines/stay-up-to-date.html   to find out how to stay up to date with your COVID-19 vaccines.   I'm immunocompromised. How many doses of the vaccine do I need?   For information on how immunocompromised people can stay up to date with their COVID-19 vaccines, visit www.cdc.gov/coronavirus/2019-ncov/vaccines/recommendations/immuno.html  .   What are the common side effects of the vaccine?   A sore arm, tiredness, headache, and muscle pain may occur within two days of getting the vaccine and last a day or two. For the Moderna or Pfizer vaccines, side effects are more common after the second dose. People over the age of 55 are less likely to have side effects than younger people.   After I'm up to date on vaccines, can I still get or spread COVID?   Yes, you can still get COVID, but your disease should be milder. And your risk of serious illness, hospitalization, and complications will be much lower, especially if  you're up to date. Unfortunately, you can still spread COVID if you've been vaccinated. That's why it's important to follow isolation guidelines if you get sick or test positive.   After I'm up to date on vaccines, can I go back to normal?   You should still wear a mask indoors in public if:    It's required by laws, rules, regulations, or local guidance.    You have a weakened immune system.    Your age puts you at increased risk of severe disease.    You have a medical condition that puts you at increased risk of severe disease.    Someone in your household has a weakened immune system, is at increased risk for severe disease, or is unvaccinated.    You're in an area of high transmission.   Where can I get a COVID-19 vaccine?   Visit [facility]'s website for more information. To find a COVID-19 vaccination site near you, visit www.[employer].gov/  , call [phone]  , or text your zip code to 912124 ([employer] Message and data rates may apply.   What about travel?   Travel increases your risk of exposure to COVID-19. For more information, see www.cdc.gov/coronavirus/2019-ncov/travelers/index.html  .   I've had close contact with someone who has COVID. Do I need to quarantine, and if so, for how long?   For the most current answer, including a calculator to determine whether you need to stay home and for how long, visit www.cdc.gov/coronavirus/2019-ncov/your-health/quarantine-isolation.html  .   I've tested positive for COVID. How long do I need to isolate?   For the latest recommendations, including a calculator to determine how long you need to stay home, visit www.cdc.gov/coronavirus/2019-ncov/your-health/quarantine-isolation.html  .   What if I develop symptoms that might be from COVID?   For the latest recommendations on what to do if you're sick, including when to seek emergency care, visit www.cdc.gov/coronavirus/2019-ncov/if-you-are-sick/steps-when-sick.html  .    Flu vaccine information   Who should  get a flu vaccine?   Everyone 6 months of age and older should get a yearly flu vaccine.   When should I get vaccinated?   You should get a flu vaccine by the end of October. Once you're vaccinated, it takes about two weeks for antibodies to develop and protect you against the flu. That's why it's important to get vaccinated as soon as possible.   After October, is it too late to get vaccinated?   No. You should still get vaccinated. As long as the flu viruses are still in your community, flu vaccines will remain available, even into January of next year or later.   Why do I need a flu vaccine EVERY year?   Flu viruses are constantly changing, so flu vaccines are usually updated from one season to the next. Your protection from the flu vaccine also lessens over time.   Is the flu vaccine safe?   Yes. Over the last 50 years, hundreds of millions of Americans have safely received the flu vaccines.   What are the side effects of flu vaccines?   You CANNOT get the flu from a flu vaccine. Common side effects of the flu shot include soreness, redness and/or swelling where the shot was given, low grade fever, and aches. Common side effects of the nasal spray flu vaccine for adults include runny nose, headaches, sore throat, and cough. For children, side effects include wheezing, vomiting, muscle aches, and fever.   Does the flu vaccine increase your risk of getting COVID-19?   No. There is no evidence that getting a flu vaccine increases your risk of getting COVID-19.   Is it safe to get the flu vaccine along with a COVID-19 vaccine?   Yes. It's safe to get the flu vaccine with a COVID-19 vaccine or booster.   Contact your healthcare provider TODAY for details on when and where to get your flu vaccine.   Your provider   Your diagnosis was provided by YULIANA Clarke, a member of your trusted care team at [facility].   If you have any questions, call us at [phone]  .

## 2023-11-26 NOTE — E-VISIT TREATED
Chief Complaint: Cold, flu, COVID, sinus, hay fever, or seasonal allergies   Patient introduction   Patient is 33-year-old female with congestion and headache that started less than 48 hours ago.   COVID-19 testing history, vaccination status, and exposure:    Has not been tested for COVID-19 since symptom onset.    Patient was prompted to take a self-test during the interview, but elected not to test now.    Has not received an updated [id number] COVID-19 vaccination (Pfizer-BioNTech or Moderna vaccine after September 12, 2023; or Novavax vaccine after October 3, 2023).    No high-risk (household) exposure to COVID-19 within the last 14 days.    No travel outside local community within the last 14 days.   Risk factors for severe disease from COVID-19 infection    Higher risk for severe disease from COVID-19 because of BMI >= 25.    Mostly sedentary lifestyle.   General presentation   Symptoms came on suddenly.   Fever:    No fever.   Sinus and nasal symptoms:    Yellow nasal drainage.    Nasal drainage is thick.    Postnasal drip.    1 to 3 episodes of antibiotic treatment for sinus infection in the last year.    Current diagnosis of deviated septum.    Sinus pain or pressure on or around the cheeks and upper teeth or jaw.    Patient first noticed sinus pain 2 to 4 weeks ago.    Sinus pain is worse with Valsalva.    No nasal discharge.    No itchy nose or sneezing.    No history of unhealed nasal septal ulcer/nasal wound.   Throat symptoms:    No sore throat.   Head and body aches:    Headache described as moderate (4 to 6 on a scale of 1 to 10).    No sweats.    No chills.    No myalgia.    No fatigue.   Cough:    No cough.   Wheezing and shortness of breath:    No COPD diagnosis.    No asthma diagnosis.    No wheezing.    No shortness of breath.   Chest pain:    No chest pain.   Ear symptoms:    Current symptoms include pain, fullness, and crackling or popping in the ear(s).   Dizziness:    Mild dizziness that  "does not interfere with daily activities.   Allergies:    Patient has known seasonal allergies.   Flu exposure:    No recent known exposure to a person with a confirmed flu diagnosis.    Has not had a flu vaccine this season.   Patient is taking over-the-counter medications for current symptoms, including diphenhydramine and ibuprofen.   Review of red flags/alarm symptoms:    No changes in alertness or awareness.    No symptoms suggesting intracranial hemorrhage.    No decreased urination.   Pregnancy/menstrual status/breastfeeding:    Not pregnant.    Not breastfeeding.    Regarding date of last menstrual period, patient writes: Now. Started 11/25.   Self-exam:    Height: 5' 7\"    Weight: 230 lbs    Neck lymph nodes feel normal.   Recent antibiotic use:    Has not taken antibiotics for similar symptoms within the past month.   Current medications   Currently taking sertraline 50 MG tablet.   Medication allergies   No relevant drug allergies.   Medication contraindication review   Patient has history of depression. Therefore, the following medication(s) will not be prescribed:    Metoclopramide.   No history of metoclopramide-associated dystonic reaction and tardive dyskinesia.   No known history of amoxicillin-clavulanate-associated cholestatic jaundice or hepatic impairment.   No known history of azithromycin-associated cholestatic jaundice or hepatic impairment.   Past medical history   Immune conditions:   No history of cancer.   Social history   Never smoked tobacco.   High-risk household contacts:    Household contact under the age of 5.   Patient did not request an excuse note.   Assessment   Viral sinusitis.   This is the likely diagnosis based on patient's interview responses, including:    Symptom profile    Duration of symptoms is shorter than 10 days   Plan   Medications:    acetaminophen 325 mg tablet OTC 325mg 2 tabs PO q4h PRN 10d for any fever, pain, or discomfort associated with your condition. Do " not exceed 6 doses in a 24-hour period. Amount is 120 tab.    fluticasone 50 mcg/actuation nasal spray,suspension OTC 50mcg 2 sprays each nostril nasal qd 30d for nasal symptoms due to allergies or sinusitis. Fluticasone needs to be used every day to be effective. It may take up to a week for the full effects of the medication to be seen. Brands to look for include Flonase. Amount is 16 g.    Mucus Relief  mg tablet, extended release OTC 600mg 1 tab PO q12h PRN 7d for cough and congestion. Amount is 14 tab.   No prescriptions were ordered to treat this patient. The patient selected the following pharmacy during their interview, but no prescriptions were sent:   [facility]   [address] [place] [id number]   Phone: [phone]     Fax: [phone]   Patient informed to purchase OTC medications.   Education:    Condition and causes    Prevention    Treatment and self-care    When to call provider   ----------   Electronically signed by YULIANA Clarke on 2023-11-26 at 12:18PM   ----------   Patient Interview Transcript:   Which of these symptoms are bothering you? Select all that apply.    Stuffed-up nose or sinuses    Headache   Not selected:    Cough    Shortness of breath    Runny nose    Itchy or watery eyes    Itchy nose or sneezing    Loss of smell or taste    Sore throat    Hoarse voice or loss of voice    Fever    Sweats    Chills    Muscle or body aches    Fatigue or tiredness    Nausea or vomiting    Diarrhea    I don't have any of these symptoms   When did your current symptoms start? Select one.    Less than 48 hours ago   Not selected:    3 to 5 days ago    6 to 9 days ago    10 to 14 days ago    2 to 3 weeks ago    3 to 4 weeks ago    More than a month ago   Do you know the exact date your symptoms started? If so, enter the date as MM/DD/YY. Select one.    No   Not selected:    Yes (specify)   Did your symptoms come on suddenly or gradually? Select one.    Suddenly   Not selected:     Gradually    I'm not sure   Since your current symptoms started, have you been tested for COVID-19? This includes home self-tests as well as nose swab or saliva tests done at a doctor's office, lab, or testing site. Select one.    No   Not selected:    Yes   Taking a home COVID test can help your provider give you the best care. - If you have a COVID test kit, take the test now before continuing this interview. - If you choose not to take a test or don't have one, you should still continue this interview. Your provider can still help you get care. Do you have a COVID test kit? Select one.    Yes, but I prefer not to take a test now   Not selected:    Yes, and I'll take a test now    No, I don't have a test kit   Has anyone in your household tested positive for COVID-19 in the past 14 days? Select one.    No   Not selected:    Yes   Have you gotten the [id number] updated COVID-19 vaccine? This means either the updated [employer] or Moderna vaccine after September 12, 2023; or the updated Novavax vaccine after October 3, 2023. Select one.    No   Not selected:    Yes   In the last 14 days, have you traveled outside of your local community? This includes travel by car, RV, bus, train, or plane. Travel increases your chances of getting and spreading COVID-19. Select one.    No   Not selected:    Yes   You mentioned having a headache. On a scale of 1 to 10, how severe is your headache pain? Select one.    Moderate (4 to 6)   Not selected:    Mild (1 to 3)    Severe (7 to 9)    Unbearable (10)    The worst headache of my life (10+)   Do you feel sinus pain or pressure in any of these areas?    In my cheeks    In my upper teeth or jaw   Not selected:    In my forehead    Around my eyes    Behind my nose    No   When did you first notice your sinus pain or pressure? Select one.    2 to 4 weeks ago   Not selected:    Less than 5 days ago    5 to 9 days ago    10 to 14 days ago    1 month ago or longer   Does coughing,  "sneezing, or leaning forward make your sinuses feel worse? Select one.    Yes   Not selected:    No   What color is your nasal drainage? Select one.    Yellow or yellowish   Not selected:    Clear    White    Green or greenish    My nose is stuffed but not draining or running   Is your nasal drainage thick or thin? Select one.    Thick   Not selected:    Thin   Is there any drainage (mucus) going down the back of your throat? This kind of drainage is also called \"postnasal drip.\" It can cause frequent throat clearing. Select one.    Yes   Not selected:    No, not that I know of   Since your symptoms started, have you felt dizzy? Select one.    Yes, but I can continue with my regular daily activities   Not selected:    Yes, and it makes it hard to stand, walk, or do daily activities    No   Do you have chest pain? You might also feel it as discomfort, aching, tightness, or squeezing in the chest. Select one.    No   Not selected:    Yes   Have you urinated at least 3 times in the last 24 hours? Select one.    Yes   Not selected:    No   Changes in alertness or awareness may mean you need emergency care. Since your symptoms started, have you had any of these? Select all that apply.    None of the above   Not selected:    Confusion    Slurred speech    Not knowing where you are or what day it is    Difficulty staying conscious    Fainting or passing out   Do your symptoms include a whistling sound, or wheezing, when you breathe? Select one.    No   Not selected:    Yes   Do you have any of these symptoms in your ear(s)? Select all that apply.    Pain    Fullness    Crackling or popping   Not selected:    Pressure    Plugged or blocked sensation    None of the above   Are your glands/lymph nodes swollen, or does it hurt when you touch them?    No, not that I can tell   Not selected:    Yes   In the past week, has anyone around you (such as at school, work, or home) had a confirmed diagnosis of the flu? A confirmed " diagnosis means that a nose swab was done to verify a flu infection. Select all that apply.    No, not that I know of   Not selected:    I live with someone who has the flu    I've been within touching distance of someone who has the flu    I've walked by, or sat about 3 feet away from, someone who has the flu    I've been in the same building as someone who has the flu   Have you ever been diagnosed with asthma? Select one.    No   Not selected:    Yes   Have you ever been diagnosed with chronic obstructive pulmonary disease (COPD)? Select one.    No, not that I know of   Not selected:    Yes   In the past month, have you taken antibiotics for similar symptoms? Examples of antibiotics include amoxicillin, amoxicillin-clavulanate (Augmentin), penicillin, cefdinir (Omnicef), doxycycline, and clindamycin (Cleocin). Select one.    No   Not selected:    Yes (specify)   In the last year, how many times were you treated with antibiotics for a sinus infection? Select one.    1 to 3 times   Not selected:    None    4 or more times   Have you been diagnosed with a deviated septum or nasal polyps? The nose is divided into two nostrils by the septum. A crooked septum is called a deviated septum. Nasal polyps are growths inside the nose or sinuses. Select one.    Yes, I have a deviated septum   Not selected:    Yes, but I had surgery to treat them    Yes, I have nasal polyps    Yes, I have a deviated septum and nasal polyps    No, not that I know of   Do you have a sore inside your nose that won't heal? Select one.    No, not that I know of   Not selected:    Yes   Do you have allergies (pollen, dust mites, mold, animal dander)? Select one.    Yes   Not selected:    No, not that I know of   What kind of allergies do you have? Select all that apply.    Seasonal allergies (hay fever)   Not selected:    Perennial, or year-round, allergies (hay fever)    Pet allergies    Dust allergies    None of the above    I'm not sure   Do you  think your symptoms could be allergy-related? Select one.    I'm not sure   Not selected:    Yes    No   Have you had a flu shot this season? Select one.    No   Not selected:    Yes, less than 2 weeks ago    Yes, 2 to 4 weeks ago    Yes, 1 to 3 months ago    Yes, 3 to 6 months ago    Yes, more than 6 months ago   Are you pregnant? Select one.    No   Not selected:    Yes   When was your last menstrual period? If you don't currently have periods or no longer have periods, please briefly explain.    Now. Started    Within the last 2 weeks, have you: - Given birth - Had a miscarriage - Had a pregnancy loss - Had an  Being postpartum (live birth or loss) within the last 2 weeks increases your risk of flu complications. Select one.    No   Not selected:    Yes   Are you breastfeeding? Select one.    No   Not selected:    Yes   The flu and COVID-19 can be more serious for people in certain groups. The next few questions help us figure out if you or anyone you live with is at higher risk for complications from these infections. Do any of these statements apply to you? Select all that apply.    None of the above   Not selected:    I'm     I'm     I'm Black    I'm  or    Do you smoke tobacco? Select one.    No   Not selected:    Yes, every day    Yes, some days    No, I quit   Do you have a mostly inactive lifestyle? Answer yes if all of these are true: - You spend at least 6 hours a day sitting or lying down - You get less than 2 and a half hours per week of moderate exercise such as walking fast - You get less than 1 hour and 15 minutes per week of intense exercise such as jogging or running Select one.    Yes   Not selected:    No   Do you have any of these conditions? Select all that apply.    None of the above   Not selected:    Chronic lung disease, such as cystic fibrosis or interstitial fibrosis    Heart disease, such as congenital heart disease, congestive  heart failure, or coronary artery disease    Disorder of the brain, spinal cord, or nerves and muscles, such as dementia, cerebral palsy, epilepsy, muscular dystrophy, or developmental delay    Metabolic disorder or mitochondrial disease    Cerebrovascular disease, such as stroke or another condition affecting the blood vessels or blood supply to the brain    Down syndrome    Mood disorder, including depression or schizophrenia spectrum disorders    Substance use disorder, such as alcohol, opioid, or cocaine use disorder    Tuberculosis    Primary immunodeficiency   Do you live in a group care setting? Examples include: - Nursing home - Residential care - Psychiatric treatment facility - Group home - [facility] - [facility] and care home - Homeless shelter - Foster care setting Select one.    No   Not selected:    Yes   Are you a healthcare worker? Select one.    No   Not selected:    Yes   People with a very high body mass index (BMI) are at higher risk for developing complications from the flu and severe illness from COVID-19. To determine your BMI, we need to know your weight and height. Please enter your weight (in pounds).    Weight   Please enter your height.    Height   Do you have any of these conditions that can affect the immune system? Scroll to see all options. Select all that apply.    None of these   Not selected:    History of bone marrow transplant    Chronic kidney disease    Chronic liver disease (including cirrhosis)    HIV/AIDS    Inflammatory bowel disease (Crohn's disease or ulcerative colitis)    Lupus    Moderate to severe plaque psoriasis    Multiple sclerosis    Rheumatoid arthritis    Sickle cell anemia    Alpha or beta thalassemia    History of solid organ transplant (kidney, liver, or heart)    History of spleen removal    An autoimmune disorder not listed here (specify)    A condition requiring treatment with long-term use of oral steroids (such as prednisone, prednisolone, or dexamethasone)  (specify)   Have you ever been diagnosed with cancer? Select one.    No   Not selected:    Yes, I have cancer now    Yes, but I'm in remission   Do any of these apply to you? Select all that apply.    None of the above   Not selected:    I've been hospitalized within the last 5 days    I have diabetes    I'm in close contact with a child in    The flu and COVID-19 can be more serious for people in certain groups. Do any of these apply to the people who live with you? Select all that apply.    Under age 5   Not selected:    Over age 65            Black     or     Pregnant    Has given birth, had a miscarriage, had a pregnancy loss, or had an  in the last 2 weeks    None of the above   Does any member of your household have any of these medical conditions? Select all that apply.    None of the above   Not selected:    Asthma    Disorders of the brain, spinal cord, or nerves and muscles, such as dementia, cerebral palsy, epilepsy, muscular dystrophy, or developmental delay    Chronic lung disease, such as COPD or cystic fibrosis    Heart disease, such as congenital heart disease, congestive heart failure, or coronary artery disease    Cerebrovascular disease, such as stroke or another condition affecting the blood vessels or blood supply to the brain    Blood disorders, such as sickle cell disease    Diabetes    Metabolic disorders such as inherited metabolic disorders or mitochondrial disease    Kidney disorders    Liver disorders    Weakened immune system due to illness or medications such as chemotherapy or steroids    Children under the age of 19 who are on long-term aspirin therapy    Extreme obesity (BMI > 40)   Do you have any of these conditions? Scroll to see all options. Select all that apply.    Depression   Not selected:    Aspirin triad (also known as Samter's triad or ASA triad)    Asthma or hives from taking aspirin or other NSAIDs, such as  ibuprofen or naproxen    Blockage or narrowing of the blood vessels of the heart    Blood clotting disorder    Blood dyscrasia, such anemia, leukemia, lymphoma, or myeloma    Bone marrow depression    Catecholamine-releasing paraganglioma    Congenital long QT syndrome    Difficulty urinating or completely emptying your bladder    Uncorrected electrolyte abnormalities    Fungal infection    Gastrointestinal (GI) bleeding    Gastrointestinal (GI) obstruction    G6PD deficiency    Recent heart attack    High blood pressure    Irregular heartbeat or heart rhythm    Mononucleosis (mono)    Myasthenia gravis    Parkinson's disease    Pheochromocytoma    Reye syndrome    Seizure disorder    Thyroid disease    Ulcerative colitis    None of the above   Have you ever had either of these conditions? Select all that apply.    No   Not selected:    Metoclopramide-associated dystonic reaction    Tardive dyskinesia   Just a few more questions about medications, and then you're finished. Have you used any non-prescription medications or nasal sprays for your current symptoms? Examples include saline sprays, decongestants, NyQuil, and Tylenol. Select one.    Yes   Not selected:    No   Which of these non-prescription medications have you tried? Scroll to see all options. Select all that apply.    Diphenhydramine (Benadryl)    Ibuprofen (Advil, Motrin, Midol)   Not selected:    Acetaminophen (Tylenol)    Budesonide (Rhinocort)    Cetirizine (Zyrtec)    Chlorpheniramine (Aller-chlor, Chlor-Trimeton)    Cromolyn (NasalCrom)    Dextromethorphan (Delsym, Robitussin, Vicks DayQuil Cough)    Fexofenadine (Allegra)    Fluticasone (Flonase)    Guaifenesin (Mucinex)    Guaifenesin/dextromethorphan (Delsym DM, Mucinex DM, Robitussin DM)    Ketotifen (Alaway, Zaditor)    Loratadine (Alavert, Claritin)    Naphazoline-pheniramine (Naphcon-A, Opcon-A, Visine-A)    Omeprazole (Prilosec)    Oxymetazoline (Afrin)    Phenylephrine (Sudafed PE)    " Triamcinolone (Nasacort)    None of the above   Have you taken any monoamine oxidase inhibitor (MAOI) medications in the last 14 days? Examples include rasagiline (Azilect), selegiline (Eldepryl, Zelapar), isocarboxazid (Marplan), phenelzine (Nardil), and tranylcypromine (Parnate). Select one.    No, not that I know of   Not selected:    Yes   Do you take Kynmobi or Apokyn (apomorphine)? Select one.    No   Not selected:    Yes   Are you still taking these medications listed in your medical record? If you're not taking any of these, click Next. Select all that apply.    sertraline 50 MG tablet   Are you taking any other medications, vitamins, or supplements? Select one.    No   Not selected:    Yes   Have you ever had an allergic or bad reaction to any medication? Select one.    Yes   Not selected:    No   Have you had an allergic or bad reaction to any of these medications? Select all that apply.    No, not that I know of   Not selected:    Baloxavir (Xofluza)    Benzonatate (Tessalon Perles)    Fluconazole, itraconazole, or terconazole (brands include Diflucan, Sporanox, Terazol)    Oseltamivir (Tamiflu) or zanamivir (Relenza)    Paxlovid, nirmatrelvir, or ritonavir (Norvir)   Have you had an allergic or bad reaction to any of these antibiotic medications? Select all that apply.    No, not that I know of   Not selected:    Penicillin or any \"-cillin\" antibiotic, such as amoxicillin, ampicillin, dicloxacillin, nafcillin, or piperacillin (Brands include Augmentin, Unasyn, and Zosyn)    Tetracycline or any \"-cycline\" antibiotic, such as doxycycline, demeclocycline, minocycline (Brands include Declomycin, Doryx, Dynacin, Oracea, Monodox, Panmycin, and Vibramycin)    Ciprofloxacin or any \"-floxacin\" antibiotic, such as gemifloxacin, levofloxacin, moxifloxacin, or ofloxacin (Brands include Factive, Cipro, Floxin, and Levaquin)    Cephalexin or any \"cef-\" antibiotic, such as cefazolin, cefdinir, cefuroxime, ceftriaxone, " "ceftazidime, or cefepime (Brands include Ancef, Ceftin, Fortaz, Keflex, Maxipime, Rocephin, and Simplicef)    Azithromycin or any \"-thromycin\" antibiotic, such as erythromycin or clarithromycin (Brands include Biaxin, Erythrocin, Z-marisa, and Zithromax)    Clindamycin or lincomycin (Brands include Cleocin and Lincocin)   Have you had an allergic or bad reaction to any of these medications? Select all that apply.    No, not that I know of   Not selected:    Albuterol or a similar medication    Atropine    Corticosteroid (steroid) medication, including topical steroids, inhaled steroids, nasal steroids, or oral steroids (budesonide, ciclesonide, dexamethasone, flunisolide, fluticasone, methylprednisolone, triamcinolone, prednisone (or brand names Alvesco, Deltasone, Flovent, Medrol, Nasacort, Rhinocort, or Veramyst)    Metoclopramide (Reglan)    Ondansetron (Zuplenz, Zofran ODT, Zofran)    Prochlorperazine (Compazine)   Have you had an allergic or bad reaction to any of these eye drops, nasal sprays, or inhalers? Scroll to see all options. Select all that apply.    No, not that I know of   Not selected:    Azelastine (Astelin, Astepro, Optivar)    Cromolyn (Crolom, NasalCrom)    Ipratropium (Atrovent)    Ketotifen (Alaway, Zaditor)    Pheniramine/naphazoline (Naphcon-A, Opcon-A, Visine-A)    Olopatadine (Pataday, Patanol, Pazeo)   Have you had an allergic or bad reaction to any of these non-prescription medications? Scroll to see all options. Select all that apply.    No, not that I know of   Not selected:    Acetaminophen (Tylenol)    Aspirin    Cetirizine (Zyrtec)    Dextromethorphan (Delsym, Robitussin, Vicks DayQuil Cough)    Diphenhydramine (Benadryl)    Fexofenadine (Allegra)    Guaifenesin (Mucinex)    Dextromethorphan (Delsym)    Ibuprofen (Advil, Motrin, Midol)    Loratadine (Alavert, Claritin)    Oxymetazoline (Afrin)    Phenylephrine (Sudafed PE)    Pseudoephedrine (Sudafed)   Are you allergic to milk or to " the proteins found in milk (for example, whey or casein)? A milk allergy is different from lactose intolerance. Select one.    No, not that I know of   Not selected:    Yes   Have you ever had jaundice or liver problems as a result of taking amoxicillin-clavulanate (Augmentin)? Jaundice is a condition in which the skin and the whites of the eyes turn yellow. Select all that apply.    No, not that I know of   Not selected:    Yes, jaundice    Yes, liver problems   Have you ever had jaundice or liver problems as a result of taking azithromycin (Zithromax, Zmax)? Jaundice is a condition in which the skin and the whites of the eyes turn yellow. Select all that apply.    No, not that I know of   Not selected:    Yes, jaundice    Yes, liver problems   Do you need a doctor's note? A doctor's note confirms that you received care today and states when you can return to school or work. It does not contain information about your diagnosis or treatment plan. Your provider will make the final decision on whether to give you a doctor's note and for how long. Doctor's notes CANNOT be backdated. We can't provide medical leave paperwork through this type of visit. If more paperwork is needed to request time off, contact your primary care provider. Select one.    No   Not selected:    Today only (1 day)    Today and tomorrow (2 days)    3 days    5 days    7 days    10 days    14 days   Is there anything you'd like to add about your symptoms? Please limit your comments to the symptoms asked about in this interview. If you include comments about other concerns, your provider may recommend that you be seen in person.   The patient did not enter any additional information.   ----------   Medical history   The following information was received from the EMR on November 26, 2023.   Allergies:    AMOXICILLIN   - Allergy Type: Medication   - Reaction:   - Severity: None   - Clinical Status: Active   - Verification Status: Confirmed     IMITREX [SUMATRIPTAN]   - Allergy Type: Medication   - Reaction: Nausea And Vomiting, Nausea Only   - Severity: None   - Clinical Status: Active   - Verification Status: Confirmed    PENICILLINS   - Allergy Type: Medication   - Reaction: Other (See Comments)   - Severity: None   - Clinical Status: Active   - Verification Status: Confirmed   Medications:    apixaban (ELIQUIS) tablet   - Route: Oral   - Start Date: 2022   - End Date: None   - Status: Active    naltrexone-bupropion (CONTRAVE) ER tablet 8-90 mg   - Route:   - Start Date: 2023   - End Date: None   - Status: Active    sertraline (ZOLOFT) tablet   - Route: Oral   - Start Date: May 31, 2022   - End Date: None   - Status: Active    apixaban (ELIQUIS) tablet 5 mg starter pack   - Route: Oral   - Start Date: 2022   - End Date: None   - Status: Active   Problem list:    Diagnosis unknown   - Category: Problem List Item   - Health Status:   - Start Date: 2017   - End Date: 2017   - Status: Resolved    Anemia   - Category: Problem List Item   - Health Status:   - Start Date: 2017   - End Date: 2017   - Status: Resolved    Non-reassuring electronic fetal monitoring tracing   - Category: Problem List Item   - Health Status:   - Start Date: 2017   - End Date: 2017   - Status: Resolved    Pregnancy   - Category: Problem List Item   - Health Status:   - Start Date: 2017   - End Date: 2017   - Status: Resolved    Normal labor   - Category: Problem List Item   - Health Status:   - Start Date: 2017   - End Date: 2017   - Status: Resolved    Pregnancy   - Category: Problem List Item   - Health Status:   - Start Date: 2020   - End Date: 2020   - Status: Resolved    Maternal care due to low transverse uterine scar from previous  delivery   - Category: Problem List Item   - Health Status:    - Start Date: 2020   - End Date: 2021   - Status: Resolved     delivery delivered   - Category: Problem List Item   - Health Status:   - Start Date: 2020   - End Date: None   - Status: Active    Kidney stone   - Category: Problem List Item   - Health Status:   - Start Date: May 31, 2022   - End Date: None   - Status: Active    Knee pain   - Category: Problem List Item   - Health Status:   - Start Date: May 31, 2022   - End Date: None   - Status: Active    Migraine without status migrainosus, not intractable   - Category: Problem List Item   - Health Status:   - Start Date: May 31, 2022   - End Date: None   - Status: Active    Shoulder pain   - Category: Problem List Item   - Health Status:   - Start Date: May 31, 2022   - End Date: None   - Status: Active    Unilateral cleft palate with cleft lip   - Category: Problem List Item   - Health Status:   - Start Date: May 31, 2022   - End Date: None   - Status: Active    Ureteral calculus, right   - Category: Problem List Item   - Health Status:   - Start Date: May 31, 2022   - End Date: None   - Status: Active    Pulmonary embolism with acute cor pulmonale   - Category: Problem List Item   - Health Status:   - Start Date: 2022   - End Date: None   - Status: Active    Saddle embolus of pulmonary artery   - Category: Problem List Item   - Health Status:   - Start Date: 2022   - End Date: None   - Status: Active    Obesity (BMI 30-39.9)   - Category: Problem List Item   - Health Status:   - Start Date: 2022   - End Date: None   - Status: Active    Uses birth control   - Category: Problem List Item   - Health Status:   - Start Date: 2022   - End Date: None   - Status: Active    Discoid lateral meniscus of left knee   - Category: Problem List Item   - Health Status:   - Start Date: 2023   - End Date: None   - Status: Active

## 2023-11-28 ENCOUNTER — OFFICE VISIT (OUTPATIENT)
Dept: FAMILY MEDICINE CLINIC | Facility: CLINIC | Age: 33
End: 2023-11-28
Payer: COMMERCIAL

## 2023-11-28 VITALS
TEMPERATURE: 98.6 F | DIASTOLIC BLOOD PRESSURE: 78 MMHG | WEIGHT: 232.2 LBS | OXYGEN SATURATION: 98 % | HEART RATE: 88 BPM | RESPIRATION RATE: 18 BRPM | SYSTOLIC BLOOD PRESSURE: 111 MMHG | HEIGHT: 67 IN | BODY MASS INDEX: 36.44 KG/M2

## 2023-11-28 DIAGNOSIS — J03.90 ACUTE TONSILLITIS, UNSPECIFIED ETIOLOGY: Primary | ICD-10-CM

## 2023-11-28 DIAGNOSIS — R05.9 COUGH, UNSPECIFIED TYPE: ICD-10-CM

## 2023-11-28 LAB
EXPIRATION DATE: NORMAL
FLUAV AG UPPER RESP QL IA.RAPID: NOT DETECTED
FLUBV AG UPPER RESP QL IA.RAPID: NOT DETECTED
INTERNAL CONTROL: NORMAL
Lab: NORMAL
SARS-COV-2 AG UPPER RESP QL IA.RAPID: NOT DETECTED

## 2023-11-28 RX ORDER — CEFDINIR 300 MG/1
300 CAPSULE ORAL 2 TIMES DAILY
Qty: 20 CAPSULE | Refills: 0 | Status: SHIPPED | OUTPATIENT
Start: 2023-11-28 | End: 2023-12-08

## 2023-11-28 RX ORDER — METHYLPREDNISOLONE SODIUM SUCCINATE 125 MG/2ML
80 INJECTION, POWDER, LYOPHILIZED, FOR SOLUTION INTRAMUSCULAR; INTRAVENOUS ONCE
Status: COMPLETED | OUTPATIENT
Start: 2023-11-28 | End: 2023-11-28

## 2023-11-28 RX ADMIN — METHYLPREDNISOLONE SODIUM SUCCINATE 80 MG: 125 INJECTION, POWDER, LYOPHILIZED, FOR SOLUTION INTRAMUSCULAR; INTRAVENOUS at 10:02

## 2023-11-28 NOTE — PROGRESS NOTES
"Answers submitted by the patient for this visit:  Primary Reason for Visit (Submitted on 2023)  What is the primary reason for your visit?: Sore Throat  Chief Complaint  Cough and Nasal Congestion    Abdirahman Harris presents to [facility]  Sore Throat   This is a new problem. The current episode started in the past 7 days. The problem has been rapidly worsening. The pain is worse on the right side. There has been no fever. The pain is at a severity of 5/10. Associated symptoms include congestion, coughing, ear pain, headaches, a hoarse voice, a plugged ear sensation, neck pain and trouble swallowing. Pertinent negatives include no abdominal pain, diarrhea, drooling, ear discharge, shortness of breath, stridor, swollen glands or vomiting. She has had no exposure to strep or mono. She has tried acetaminophen for the symptoms. The treatment provided no relief.     Patient is a 33-year-old female who presents today with complaints of congestion, cough, sore throat, earache. No known fever. Reports having \"sinus infection\" for a while but sore throat and earache started 2023. She has taken sudafed, mucinex, nyquil, and dayquil with no relief.     Past Medical History:   Diagnosis Date    Anemia     Chronic kidney disease     stones    Deep vein thrombosis 2022    Depression 2022    Kidney stone     [facility] in     Migraine     Stress related    Ovarian cyst     Pulmonary embolism 2022     Past Surgical History:   Procedure Laterality Date     SECTION N/A 10/02/2017    Procedure:  SECTION PRIMARY;  Surgeon: Cecily Riggs MD;  Location: [facility] LABOR DELIVERY;  Service:      SECTION N/A 2020    Procedure: REPEAT  SECTION WITHOUT TUBAL LIGATION;  Surgeon: Cecily Riggs MD;  Location: [facility] LABOR DELIVERY;  Service: Obstetrics/Gynecology;  Laterality: N/A;    CLEFT LIP REPAIR      CLEFT " "PALATE REPAIR      8 surgeries    COSMETIC SURGERY      Cleft lip repair [id number]    HAND OPEN REDUCTION INTERNAL FIXATION      KNEE ARTHROSCOPY MENISCUS TRANSPLANT Right     KNEE ARTHROSCOPY W/ MENISCAL REPAIR Left     KNEE SURGERY      piece of femur broke off into knee     Social History     Socioeconomic History    Marital status:    Tobacco Use    Smoking status: Never     Passive exposure: Never    Smokeless tobacco: Never   Vaping Use    Vaping Use: Never used   Substance and Sexual Activity    Alcohol use: No    Drug use: Never    Sexual activity: Yes     Partners: Male     Birth control/protection: Vasectomy     Family History   Problem Relation Age of Onset    Hypertension Father     Alcohol abuse Father     Birth defects Father         Bilateral Cleft Lip    Prostate cancer Paternal Grandfather     Breast cancer Paternal Grandmother     Melanoma Paternal Grandmother     Cancer Paternal Grandmother         Breast, Skin, and Pancreatic cancers    Depression Mother     Thyroid disease Mother     Cancer Maternal Grandfather         Prostate cancer       Objective   Vital Signs:  /78 (BP Location: Left arm, Patient Position: Sitting, Cuff Size: Large Adult)   Pulse 88   Temp 98.6 °F (37 °C) (Infrared)   Resp 18   Ht 170.2 cm (67\")   Wt 105 kg (232 lb 3.2 oz)   SpO2 98%   BMI 36.37 kg/m²   Estimated body mass index is 36.37 kg/m² as calculated from the following:    Height as of this encounter: 170.2 cm (67\").    Weight as of this encounter: 105 kg (232 lb 3.2 oz).            Physical Exam  Vitals and nursing note reviewed.   Constitutional:       General: She is not in acute distress.     Appearance: Normal appearance. She is ill-appearing.   HENT:      Head: Normocephalic.      Right Ear: Tympanic membrane is bulging.      Left Ear: Tympanic membrane normal.      Nose: Nose normal. Congestion and rhinorrhea present.      Mouth/Throat:      Mouth: Mucous membranes are moist.      Pharynx: " Oropharynx is clear. Posterior oropharyngeal erythema present.      Tonsils: 2+ on the right.   Eyes:      Pupils: Pupils are equal, round, and reactive to light.   Cardiovascular:      Rate and Rhythm: Normal rate and regular rhythm.      Pulses: Normal pulses.      Heart sounds: Normal heart sounds. No murmur heard.  Pulmonary:      Effort: Pulmonary effort is normal. No respiratory distress.      Breath sounds: Normal breath sounds.   Abdominal:      General: Bowel sounds are normal.      Palpations: Abdomen is soft.   Musculoskeletal:         General: Normal range of motion.      Cervical back: Normal range of motion. Tenderness present.   Skin:     General: Skin is warm and dry.      Capillary Refill: Capillary refill takes less than 2 seconds.   Neurological:      General: No focal deficit present.      Mental Status: She is alert.        Result Review :  The following data was reviewed by: YULIANA Hicks on 11/28/2023:  SARS Antigen   Date Value Ref Range Status   11/28/2023 Not Detected Not Detected, Presumptive Negative Final     Influenza A Antigen MATHIEU   Date Value Ref Range Status   11/28/2023 Not Detected Not Detected Final     Influenza B Antigen MATHIEU   Date Value Ref Range Status   11/28/2023 Not Detected Not Detected Final                Assessment and Plan   Diagnoses and all orders for this visit:    1. Acute tonsillitis, unspecified etiology (Primary)  -     cefdinir (OMNICEF) 300 MG capsule; Take 1 capsule by mouth 2 (Two) Times a Day for 10 days.  Dispense: 20 capsule; Refill: 0    2. Cough, unspecified type  -     POCT SARS-CoV-2 Antigen MATHIEU + Flu  -     methylPREDNISolone sodium succinate (SOLU-Medrol) injection 80 mg             Follow Up   Return if symptoms worsen or fail to improve.  Patient was given instructions and counseling regarding her condition or for health maintenance advice. Please see specific information pulled into the AVS if appropriate.

## 2023-12-18 ENCOUNTER — OFFICE VISIT (OUTPATIENT)
Dept: INTERNAL MEDICINE | Facility: CLINIC | Age: 33
End: 2023-12-18
Payer: COMMERCIAL

## 2023-12-18 VITALS
OXYGEN SATURATION: 99 % | RESPIRATION RATE: 18 BRPM | TEMPERATURE: 97.9 F | DIASTOLIC BLOOD PRESSURE: 71 MMHG | WEIGHT: 231 LBS | SYSTOLIC BLOOD PRESSURE: 105 MMHG | BODY MASS INDEX: 36.26 KG/M2 | HEIGHT: 67 IN

## 2023-12-18 DIAGNOSIS — Z00.00 ROUTINE GENERAL MEDICAL EXAMINATION AT A HEALTH CARE FACILITY: Primary | ICD-10-CM

## 2023-12-18 DIAGNOSIS — Z11.59 NEED FOR HEPATITIS C SCREENING TEST: ICD-10-CM

## 2023-12-18 DIAGNOSIS — E66.9 OBESITY, CLASS II, BMI 35-39.9: ICD-10-CM

## 2023-12-18 PROCEDURE — 99395 PREV VISIT EST AGE 18-39: CPT | Performed by: NURSE PRACTITIONER

## 2023-12-18 RX ORDER — TOPIRAMATE 15 MG/1
15 CAPSULE, COATED PELLETS ORAL 2 TIMES DAILY
Qty: 60 CAPSULE | Refills: 1 | Status: SHIPPED | OUTPATIENT
Start: 2023-12-18

## 2023-12-18 RX ORDER — METFORMIN HYDROCHLORIDE 500 MG/1
500 TABLET, EXTENDED RELEASE ORAL
Qty: 30 TABLET | Refills: 3 | Status: SHIPPED | OUTPATIENT
Start: 2023-12-18

## 2023-12-18 RX ORDER — SERTRALINE HYDROCHLORIDE 100 MG/1
1 TABLET, FILM COATED ORAL DAILY
COMMUNITY
Start: 2023-12-01

## 2023-12-18 NOTE — PROGRESS NOTES
Subjective     Chief Complaint   Patient presents with    Annual Exam       History of Present Illness  Pt comes in today for her annual exam. Overall she is doing well. She is off anticoagulation from her provoked DVT/PE. She is wanting to look into weight loss medication.     Review of Systems   Constitutional:  Negative for fatigue.   HENT:  Positive for rhinorrhea.    Eyes:  Negative for visual disturbance.   Respiratory:  Negative for cough, choking and shortness of breath.    Cardiovascular:  Negative for chest pain and palpitations.   Gastrointestinal:  Negative for blood in stool, constipation and diarrhea.   Endocrine: Negative for polydipsia, polyphagia and polyuria.   Genitourinary:  Negative for dyspareunia and menstrual problem.   Musculoskeletal:  Negative for back pain and joint swelling.   Skin: Negative.    Neurological:  Positive for headaches. Negative for seizures.   Psychiatric/Behavioral:  Negative for sleep disturbance.         Past Medical History:   Past Medical History:   Diagnosis Date    Anemia     Chronic kidney disease     stones    Deep vein thrombosis 2022    Depression 2022    Kidney stone     [facility] in     Migraine     Stress related    Ovarian cyst     Pulmonary embolism 2022     Past Surgical History:  Past Surgical History:   Procedure Laterality Date     SECTION N/A 10/02/2017    Procedure:  SECTION PRIMARY;  Surgeon: Cecily Riggs MD;  Location: [facility] LABOR DELIVERY;  Service:      SECTION N/A 2020    Procedure: REPEAT  SECTION WITHOUT TUBAL LIGATION;  Surgeon: Cecily Riggs MD;  Location: [facility] LABOR DELIVERY;  Service: Obstetrics/Gynecology;  Laterality: N/A;    CLEFT LIP REPAIR      CLEFT PALATE REPAIR      8 surgeries    COSMETIC SURGERY      Cleft lip repair [id number]    HAND OPEN REDUCTION INTERNAL FIXATION      KNEE ARTHROSCOPY MENISCUS TRANSPLANT Right     KNEE ARTHROSCOPY W/ MENISCAL REPAIR  "Left     KNEE SURGERY      piece of femur broke off into knee     Social History:  reports that she has never smoked. She has never been exposed to tobacco smoke. She has never used smokeless tobacco. She reports that she does not drink alcohol and does not use drugs.    Family History: family history includes Alcohol abuse in her father; Birth defects in her father; Breast cancer in her paternal grandmother; Cancer in her maternal grandfather and paternal grandmother; Depression in her mother; Hypertension in her father; Melanoma in her paternal grandmother; Prostate cancer in her paternal grandfather; Thyroid disease in her mother.       Allergies:  Allergies   Allergen Reactions    Amoxicillin      reaction as a baby, pt told by mother not to take amoxicillin.    Imitrex [Sumatriptan] Nausea And Vomiting and Nausea Only    Penicillins Other (See Comments)     Makes her crazy - mean.      Medications:  Prior to Admission medications    Medication Sig Start Date End Date Taking? Authorizing Provider   sertraline (ZOLOFT) 100 MG tablet Take 1 tablet by mouth Daily. 12/1/23  Yes Alberto Galeas MD   naltrexone-bupropion ER (CONTRAVE) 8-90 MG tablet Wk 1: 1 tab daily, Wk 2: 1 tab twice a day, Wk 3: 2 tabs in AM, 1 tab in PM, Wk 4: 2 tabs twice a day, Maintenance dose: 2 tabs twice daily. 6/28/23   Cesilia Benítez APRN   sertraline (ZOLOFT) 50 MG tablet Take 50 mg by mouth Daily. 5/26/22   ProviderAlberto MD       Objective     Vital Signs: /71   Temp 97.9 °F (36.6 °C)   Resp 18   Ht 170.2 cm (67\")   Wt 105 kg (231 lb)   SpO2 99%   BMI 36.18 kg/m²   Physical Exam  Vitals reviewed.   Constitutional:       Appearance: She is well-developed.   HENT:      Head: Normocephalic and atraumatic.      Right Ear: Tympanic membrane normal.      Left Ear: Tympanic membrane normal.   Eyes:      Pupils: Pupils are equal, round, and reactive to light.   Neck:      Vascular: No JVD.   Cardiovascular:    "   Rate and Rhythm: Normal rate and regular rhythm.   Pulmonary:      Effort: Pulmonary effort is normal.   Abdominal:      General: Bowel sounds are normal.      Palpations: Abdomen is soft.   Musculoskeletal:         General: No deformity.      Cervical back: Normal range of motion and neck supple.   Lymphadenopathy:      Cervical: No cervical adenopathy.   Skin:     General: Skin is warm and dry.   Neurological:      General: No focal deficit present.      Mental Status: She is alert and oriented to person, place, and time.   Psychiatric:         Behavior: Behavior normal.         Thought Content: Thought content normal.         Judgment: Judgment normal.       Results Reviewed:  Glucose   Date Value Ref Range Status   06/27/2023 85 70 - 99 mg/dL Final   12/13/2022 120 (H) 65 - 99 mg/dL Final     BUN   Date Value Ref Range Status   06/27/2023 12 6 - 20 mg/dL Final   12/13/2022 15 6 - 20 mg/dL Final     Creatinine   Date Value Ref Range Status   06/27/2023 0.86 0.57 - 1.00 mg/dL Final   12/13/2022 0.65 0.57 - 1.00 mg/dL Final     Sodium   Date Value Ref Range Status   06/27/2023 139 134 - 144 mmol/L Final   12/13/2022 139 136 - 145 mmol/L Final     Potassium   Date Value Ref Range Status   06/27/2023 4.3 3.5 - 5.2 mmol/L Final   12/13/2022 4.2 3.5 - 5.2 mmol/L Final     Chloride   Date Value Ref Range Status   06/27/2023 101 96 - 106 mmol/L Final   12/13/2022 105 98 - 107 mmol/L Final     CO2   Date Value Ref Range Status   12/13/2022 24.0 22.0 - 29.0 mmol/L Final     Total CO2   Date Value Ref Range Status   06/27/2023 22 20 - 29 mmol/L Final     Calcium   Date Value Ref Range Status   06/27/2023 9.6 8.7 - 10.2 mg/dL Final   12/13/2022 9.1 8.6 - 10.5 mg/dL Final     ALT (SGPT)   Date Value Ref Range Status   06/27/2023 20 0 - 32 IU/L Final   12/13/2022 31 1 - 33 U/L Final     AST (SGOT)   Date Value Ref Range Status   06/27/2023 21 0 - 40 IU/L Final   12/13/2022 37 (H) 1 - 32 U/L Final     WBC   Date Value Ref  Range Status   06/27/2023 6.8 3.4 - 10.8 x10E3/uL Final   11/22/2022 5.2 4.8 - 10.8 K/uL Final     Hematocrit   Date Value Ref Range Status   06/27/2023 38.6 34.0 - 46.6 % Final   12/13/2022 38.2 34.0 - 46.6 % Final   11/22/2022 45.7 37.0 - 47.0 % Final     Platelets   Date Value Ref Range Status   06/27/2023 251 150 - 450 x10E3/uL Final   12/13/2022 209 140 - 450 10*3/mm3 Final   11/22/2022 263 130 - 400 K/uL Final     Total Cholesterol   Date Value Ref Range Status   12/12/2022 214 (H) 0 - 200 mg/dL Final     Triglycerides   Date Value Ref Range Status   12/12/2022 135 0 - 150 mg/dL Final     HDL Cholesterol   Date Value Ref Range Status   12/12/2022 47 40 - 60 mg/dL Final     LDL Cholesterol    Date Value Ref Range Status   12/12/2022 143 (H) 0 - 100 mg/dL Final     LDL/HDL Ratio   Date Value Ref Range Status   12/12/2022 2.98  Final     Hemoglobin A1C   Date Value Ref Range Status   06/27/2023 5.3 4.8 - 5.6 % Final     Comment:              Prediabetes: 5.7 - 6.4           Diabetes: >6.4           Glycemic control for adults with diabetes: <7.0     12/12/2022 5.10 4.80 - 5.60 % Final         Assessment / Plan     Assessment/Plan:  Diagnoses and all orders for this visit:    1. Routine general medical examination at a health care facility (Primary)  -     CBC & Differential  -     Comprehensive Metabolic Panel  -     Lipid Panel  -     TSH  -     Vitamin B12  -     Hemoglobin A1c  -     T4, Free    2. Need for hepatitis C screening test  -     Hepatitis C Antibody    3. Obesity, Class II, BMI 35-39.9  -     metFORMIN ER (GLUCOPHAGE-XR) 500 MG 24 hr tablet; Take 1 tablet by mouth Daily With Breakfast.  Dispense: 30 tablet; Refill: 3  -     topiramate (TOPAMAX) 15 MG capsule (sprinkle); Take 1 capsule by mouth 2 (Two) Times a Day.  Dispense: 60 capsule; Refill: 1       Will have lab work here today. Encouraged SBE, pt is aware how to do self breast exam and the importance of same. Discussed weight management and  importance of maintaining a healthy weight. Discussed Vitamin D intake and the importance of adequate vitamin D for both Bone Health and a healthy immune system.  Discussed Daily exercise and the importance of same, in regards to a healthy heart as well as helping to maintain her weight and improving her mental health.  BMIis elevated. Plan in place.     Return in about 3 months (around 3/18/2024). unless patient needs to be seen sooner or acute issues arise.    Code Status: Full.     I have discussed the patient results/orders and and plan/recommendation with them at today's visit.      Cesilia Benítez, APRN   12/18/2023

## 2023-12-19 LAB
ALBUMIN SERPL-MCNC: 4.1 G/DL (ref 3.9–4.9)
ALBUMIN/GLOB SERPL: 1.3 {RATIO} (ref 1.2–2.2)
ALP SERPL-CCNC: 99 IU/L (ref 44–121)
ALT SERPL-CCNC: 31 IU/L (ref 0–32)
AST SERPL-CCNC: 33 IU/L (ref 0–40)
BASOPHILS # BLD AUTO: 0 X10E3/UL (ref 0–0.2)
BASOPHILS NFR BLD AUTO: 0 %
BILIRUB SERPL-MCNC: 0.2 MG/DL (ref 0–1.2)
BUN SERPL-MCNC: 10 MG/DL (ref 6–20)
BUN/CREAT SERPL: 13 (ref 9–23)
CALCIUM SERPL-MCNC: 8.9 MG/DL (ref 8.7–10.2)
CHLORIDE SERPL-SCNC: 105 MMOL/L (ref 96–106)
CHOLEST SERPL-MCNC: 169 MG/DL (ref 100–199)
CO2 SERPL-SCNC: 21 MMOL/L (ref 20–29)
CREAT SERPL-MCNC: 0.77 MG/DL (ref 0.57–1)
EGFRCR SERPLBLD CKD-EPI 2021: 106 ML/MIN/1.73
EOSINOPHIL # BLD AUTO: 0.2 X10E3/UL (ref 0–0.4)
EOSINOPHIL NFR BLD AUTO: 3 %
ERYTHROCYTE [DISTWIDTH] IN BLOOD BY AUTOMATED COUNT: 13.9 % (ref 11.7–15.4)
GLOBULIN SER CALC-MCNC: 3.1 G/DL (ref 1.5–4.5)
GLUCOSE SERPL-MCNC: 92 MG/DL (ref 70–99)
HBA1C MFR BLD: 5.8 % (ref 4.8–5.6)
HCT VFR BLD AUTO: 34.1 % (ref 34–46.6)
HCV IGG SERPL QL IA: NON REACTIVE
HDLC SERPL-MCNC: 44 MG/DL
HGB BLD-MCNC: 11.3 G/DL (ref 11.1–15.9)
IMM GRANULOCYTES # BLD AUTO: 0.1 X10E3/UL (ref 0–0.1)
IMM GRANULOCYTES NFR BLD AUTO: 1 %
LDLC SERPL CALC-MCNC: 102 MG/DL (ref 0–99)
LYMPHOCYTES # BLD AUTO: 1.2 X10E3/UL (ref 0.7–3.1)
LYMPHOCYTES NFR BLD AUTO: 21 %
Lab: NORMAL
MCH RBC QN AUTO: 27.3 PG (ref 26.6–33)
MCHC RBC AUTO-ENTMCNC: 33.1 G/DL (ref 31.5–35.7)
MCV RBC AUTO: 82 FL (ref 79–97)
MONOCYTES # BLD AUTO: 0.4 X10E3/UL (ref 0.1–0.9)
MONOCYTES NFR BLD AUTO: 8 %
NEUTROPHILS # BLD AUTO: 3.7 X10E3/UL (ref 1.4–7)
NEUTROPHILS NFR BLD AUTO: 67 %
PLATELET # BLD AUTO: 289 X10E3/UL (ref 150–450)
POTASSIUM SERPL-SCNC: 4.3 MMOL/L (ref 3.5–5.2)
PROT SERPL-MCNC: 7.2 G/DL (ref 6–8.5)
RBC # BLD AUTO: 4.14 X10E6/UL (ref 3.77–5.28)
SODIUM SERPL-SCNC: 138 MMOL/L (ref 134–144)
T4 FREE SERPL-MCNC: 1.06 NG/DL (ref 0.82–1.77)
TRIGL SERPL-MCNC: 130 MG/DL (ref 0–149)
TSH SERPL DL<=0.005 MIU/L-ACNC: 1.07 UIU/ML (ref 0.45–4.5)
VIT B12 SERPL-MCNC: 590 PG/ML (ref 232–1245)
VLDLC SERPL CALC-MCNC: 23 MG/DL (ref 5–40)
WBC # BLD AUTO: 5.6 X10E3/UL (ref 3.4–10.8)

## 2024-03-06 ENCOUNTER — E-VISIT (OUTPATIENT)
Dept: FAMILY MEDICINE CLINIC | Facility: TELEHEALTH | Age: 34
End: 2024-03-06
Payer: COMMERCIAL

## 2024-03-06 NOTE — E-VISIT TREATED
Chief Complaint: Bladder infection (UTI)   Patient introduction   Patient is 33-year-old female. Patient provided the following organ inventory: Presence of vagina.   Patient has had dysuria, frequent urination, and urinary urgency for 4 to 6 days.   Urine is cloudy with a strong or pungent odor.   General presentation   No fever. No nausea or vomiting.   No stomach/pelvic pain.   No back pain.   No flank pain. Difficulty starting, stopping, or delaying urination.   Vaginal symptoms include:    Abnormal vaginal itching   Has not taken antibiotics for current symptoms.   The following treatments were not helpful for current symptoms:    Acetaminophen    Phenazopyridine   Previous history of UTI. Current symptoms feel exactly the same as previous UTIs. Received treatment for UTI 0 times in last year.   No known history of yeast infections as a result of taking antibiotics for past UTIs.   No history of pyelonephritis. History of kidney stones, but not within the last year.   No sexual intercourse in the past week. Does not use diaphragm. No unprotected sexual intercourse with a new partner in the last 2 weeks.   Patient is not being treated for diabetes mellitus.   Review of red flags/alarm symptoms:    No recent hospitalizations or nursing home care (last 3 months)    No history of renal failure    No recent history of urologic instrumentation    No anatomic abnormalities of the urinary tract    No abnormal vaginal discharge    No visible vaginal sores    No pain with sexual intercourse    No abnormal vaginal bleeding or spotting   Pregnancy/menstrual status/breastfeeding:   Patient is not pregnant. Patient is not breastfeeding. Regarding date of last menstrual period, patient writes: Right now. I started yesterday 3/5/24.   Current medications   Currently taking metFORMIN  MG 24 hr tablet, sertraline 100 MG tablet, and topiramate 15 MG capsule (sprinkle).   Medication allergies   No relevant drug  allergies.   Medication contraindication review   Not taking ACE inhibitors and ARBs.   No history of Gaurav-Danlos syndrome, folate deficiency, G6PD deficiency, high blood pressure, aortic aneurysm or dissection, Marfan syndrome, megaloblastic anemia, mononucleosis, myasthenia gravis, oliguria/anuria, or peripheral vascular disease.   No known history of amoxicillin-clavulanate-associated cholestatic jaundice or nitrofurantoin-associated cholestatic jaundice.   Past medical history   Immune conditions: No immunocompromising conditions.   No history of cancer.   Patient did not request an excuse note.   Assessment   Uncomplicated acute UTI.   This is the likely diagnosis based on patient's interview responses, including:    Symptom profile    Previous history of UTI    Current symptoms are exactly the same as previous UTIs    Avoiding food or drink to decrease frequency of urination   No recent history of kidney stones.   Plan   Medications:    nitrofurantoin monohydrate/macrocrystals 100 mg capsule RX 100mg 1 cap PO q12h 5d for infection. This medication is an antibiotic. Take it exactly as directed. You must finish the entire course of medication, even if you feel better after taking the first few doses. Amount is 10 cap.    phenazopyridine 200 mg tablet RX 200mg 1 tab PO tid PRN 2d for pain or discomfort associated with your condition. Amount is 6 tab.   The patient's prescriptions will be sent to:   [facility]   [address] [place] [id number]   Phone: [phone]     Fax: [phone]   Education:    Condition and causes    Prevention    Treatment and self-care    When to call provider   Follow-up:   Patient to follow up as needed for progression or lack of improvement in symptoms within 3d.   ----------   Electronically signed by YULIANA Cordero on 2024-03-06 at 12:37PM   ----------   Patient Interview Transcript:   Knowing about your anatomy is important for diagnosing and treating UTIs. The  gender we have on file for you is female, but we realize this might not tell the whole story. Which of these do you have? Select one.    Vagina   Not selected:    Penis   Which of these symptoms do you have? Select all that apply.    Pain or burning while urinating    Frequent urination    __Sudden urge to urinate and it's hard to hold the urine in __   How long have you had these symptoms? Select one.    4 to 6 days   Not selected:    Less than 24 hours    1 to 3 days    7 to 10 days    More than 10 days   Have you already started taking antibiotics for your current symptoms? Select one.    No   Not selected:    Yes   Since your current symptoms started, has it been difficult to start, stop, or delay urination? Select one.    Yes   Not selected:    No   What color is your urine? Select one.    Cloudy   Not selected:    Clear    Yellow    Pink or red   Does your urine smell strange (like ammonia) or stronger than usual? Select one.    Yes   Not selected:    No   Do you also have any of these symptoms? Select all that apply.    No   Not selected:    Fever    Nausea    Vomiting    Pain, pressure, or discomfort in the lower abdomen    Back pain   Do you have any flank pain? The flank is the side of the body between the ribs and the hips.    No   Not selected:    Yes, in my left flank    Yes, in my right flank    Yes, in both my left and right flanks   Do you have any of these vaginal symptoms? Select all that apply.    Abnormal vaginal itching   Not selected:    Unscheduled or abnormal vaginal bleeding or spotting    Pain during sex    Visible sores on the vagina    Abnormal vaginal discharge    No   In the past 2 weeks, have you had a medical device or instrument placed in your urinary tract? Examples include catheters, stents, and nephrostomy tubes. Select one.    No   Not selected:    Yes   Have you recently been hospitalized or been a resident of a nursing home or other long-term care facility? This doesn't include  emergency room (ER) visits. Select one.    No   Not selected:    Yes, within the last 2 weeks    Yes, within the last 3 months   Kidney failure    No   Not selected:    Within the last year    More than a year ago   Kidney infection (pyelonephritis)    No   Not selected:    Within the last year    More than a year ago   Kidney stones    More than a year ago   Not selected:    Within the last year    No   Kidney transplant    No   Not selected:    Within the last year    More than a year ago   Have you ever been diagnosed with any of these? Select all that apply.    No   Not selected:    Urinary reflux    Bladder diverticula    Single (or horseshoe) kidney    Duplicated urethra   Have you recently held your urine for a long time after you felt the urge to go? Select one.    No   Not selected:    Yes   Have you recently avoided eating or drinking so you wouldn't have the urge to urinate as often? Select one.    Yes   Not selected:    No   Do you use a diaphragm? Select one.    No   Not selected:    Yes   Are you pregnant? Select one.    No   Not selected:    Yes   When was your last menstrual period? If you don't currently have periods or no longer have periods, please briefly explain.    Right now. I started yesterday 3/5/24   Are you breastfeeding? Select one.    No   Not selected:    Yes   Have you had sexual intercourse in the past week? Recent sexual intercourse is a risk factor for urinary tract infections. Select one.    No   Not selected:    Yes   Have you had unprotected sexual intercourse with a new partner in the last 2 weeks? Select one.    No   Not selected:    Yes   Have you traveled to any of these countries within the last 3 months? Recent travel to these countries may affect which medication we recommend for your symptoms. Select all that apply.    None of these   Not selected:    [place]    [place]    [place]    [place]   Acetaminophen (Tylenol)    Not helpful   Not selected:    Helpful   Phenazopyridine  (Azo, Baridium, Pyridium, Uricalm, Uristat)    Not helpful   Not selected:    Helpful   Have you ever had a urinary tract infection (UTI)? A UTI is often called a bladder infection or acute cystitis. Select one.    Yes   Not selected:    No, not that I know of   How much do your current symptoms feel like past UTIs? Select one.    Exactly the same   Not selected:    Mostly the same    Somewhat the same    Totally different   In the past year, how many times have you taken antibiotics for a UTI? Select one.    0   Not selected:    1 to 3    4 or more   Have you ever developed a yeast infection as a result of taking antibiotics? Select one.    No, not that I know of   Not selected:    Yes   UTIs may be more serious when other factors are present. Let's address those now. Are you being treated for type 1 or type 2 diabetes? Select one.    No   Not selected:    Yes   Do you have any of these conditions that can affect the immune system? Scroll to see all options. Select all that apply.    None of these   Not selected:    History of bone marrow transplant    Chronic kidney disease    Chronic liver disease (including cirrhosis)    HIV/AIDS    Inflammatory bowel disease (Crohn's disease or ulcerative colitis)    Lupus    Moderate to severe plaque psoriasis    Multiple sclerosis    Rheumatoid arthritis    Sickle cell anemia    Alpha or beta thalassemia    History of kidney, liver, heart, or other solid organ transplant    History of liver, heart, or other solid organ transplant    History of spleen removal    An autoimmune disorder not listed here (specify)    A condition requiring treatment with long-term use of oral steroids (such as prednisone, prednisolone, or dexamethasone) (specify)   Have you ever been diagnosed with cancer? Select one.    No   Not selected:    Yes, I have cancer now    Yes, but I'm in remission   These last few questions will help us create the right treatment plan for you. Are you being treated for  "any of these conditions? Select all that apply.    No   Not selected:    Gaurav-Danlos syndrome    Folate deficiency    G6PD deficiency    High blood pressure    History of aortic aneurysm or dissection    Marfan syndrome    Megaloblastic anemia    Mono (mononucleosis)    Myasthenia gravis    Oliguria or anuria    Peripheral vascular disease   Have you ever had jaundice as a result of taking amoxicillin-clavulanate (Augmentin) or nitrofurantoin (Macrobid)? Select all that apply.    No   Not selected:    Yes, from amoxicillin-clavulanate (Augmentin)    Yes, from nitrofurantoin (Macrobid, Macrodantin)   Are you taking any of these medications? Select all that apply.    No   Not selected:    An ACE inhibitor such as lisinopril, enalapril, captopril, or benazepril    An angiotensin II receptor blocker (ARB) such as candesartan, irbesartan, losartan, or valsartan   Are you still taking these medications listed in your medical record? If you're not taking any of these, click Next. Select all that apply.    metFORMIN  MG 24 hr tablet    sertraline 100 MG tablet    topiramate 15 MG capsule (sprinkle)   Are you taking any other medications, vitamins, or supplements? Select one.    No   Not selected:    Yes   Have you ever had an allergic or bad reaction to any medication? Select one.    Yes   Not selected:    No   Have you had an allergic or bad reaction to any of these medications? Select all that apply.    No, not that I know of   Not selected:    Any antibiotic starting with \"cef-,\" such as cefazolin, cefdinir, cefuroxime, ceftriaxone, ceftazidime, cefepime, or cephalexin (brands include Fortaz and Keflex)    Any antibiotic ending with \"-floxacin,\" such as ciprofloxacin, gemifloxacin, levofloxacin, moxifloxacin, or ofloxacin (brands include Cipro, Factive, Floxin, and Levaquin)    Any antibiotic ending with \"-cillin,\" such as penicillin, amoxicillin, ampicillin, dicloxacillin, nafcillin, or piperacillin (brands " include Augmentin, Unasyn, and Zosyn)    Nitrofurantoin (brands include Furadantin, Macrobid and Macrodantin)    Sulfamethoxazole-trimethoprim (brands include Bactrim and Septra) or any other sulfa drug    Fluconazole (brand name Diflucan), itraconazole, or terconazole    Trimethoprim (brand name Primsol)   Have you had an allergic or bad reaction to any of these medications? Select all that apply.    No   Not selected:    Acetaminophen (Tylenol)    Ibuprofen (Advil, Midol, Motrin)    Phenazopyridine (Azo, Baridium, Pyridium, Uricalm, Uristat)   Do you need a doctor's note? A doctor's note confirms that you received care today and states when you can return to school or work. It does not contain information about your diagnosis or treatment plan. Your provider will make the final decision on whether to give you a doctor's note. Doctor's notes CANNOT be backdated. Select one.    No   Not selected:    Today only (1 day)    Today and tomorrow (2 days)    3 days   Is there anything you'd like to add about your symptoms? Please limit your comments to the symptoms covered in this interview. If you include comments about other concerns, your provider may recommend that you be seen in person.   The patient did not enter any additional information.   ----------   Medical history   Medical history data does not currently exist for this patient.

## 2024-03-06 NOTE — EXTERNAL PATIENT INSTRUCTIONS
Diagnosis   Urinary tract infection (UTI)   My name is YULIANA Cordero. I'm a healthcare provider at [facility]. After reviewing your interview, I see you have a urinary tract infection (UTI).   Medications   Your pharmacy   [facility] [address] [place] [id number] [phone]     Prescription   Nitrofurantoin monohydrate/macrocrystalline (100mg): Take 1 capsule by mouth every 12 hours for 5 days for infection. This medication is an antibiotic. Take it exactly as directed. You must finish the entire course of medication, even if you feel better after taking the first few doses.   Phenazopyridine (200mg): Take 1 tablet by mouth 3 times a day as needed for 2 days for pain or discomfort associated with your condition.    I've given you a prescription dose of phenazopyridine. If it's more affordable or convenient, you may use the equivalent amount of non-prescription phenazopyridine. For example, instead of taking one 200 mg phenazopyridine tablet, you may take two 95 mg phenazopyridine tablets.   About your diagnosis   A UTI is an infection of one or more parts of the urinary tract, most commonly the bladder.   Most UTIs are caused by bacteria (usually E. coli) that travel up the urethra and into the bladder. I see that you have some common signs and symptoms of a UTI:    Pain or burning while urinating    Frequent urination    Sudden urge to urinate    Symptoms that feel a lot like past UTIs    Cloudy urine    Strange or strong smelling urine   Fortunately, most UTIs aren't serious, and they're easily treated with antibiotics. Make sure you take all of the antibiotic pills given to you, even if you start to feel better after the first few doses. Otherwise, the UTI might come back.   What to expect   If you follow this treatment plan, you should start to feel better within 1 to 2 days.   When to seek care   Call us at [phone]   with any sudden or unexpected symptoms.    Symptoms that don't  improve or get worse in the next 48 hours    Fever that goes above 101F or lasts longer than 24 hours    Shaking or chills    Nausea or vomiting    Severe flank pain (pain in your back or side)   Other treatment    Rest and drink plenty of water    Urinate frequently and when you first feel the urge    Place a heating pad on your back or stomach to help relieve some of the discomfort   Prevention    Drink a lot of liquids to help flush bacteria from your system. Water is best. Try for six to eight, 8-ounce glasses a day on a regular basis.    Urinate often and when you first feel the urge. Bacteria can grow when urine stays in the bladder too long. Urinate after sex to flush away bacteria.    After using the toilet, always wipe from front to back. This step is most important after a bowel movement. Wiping from front to back prevents bacteria normally found in stool from entering the urinary tract.   Your provider   Your diagnosis was provided by YULIANA Cordero, a member of your trusted care team at [facility].   If you have any questions, call us at [phone]  .

## 2024-03-16 DIAGNOSIS — E66.9 OBESITY, CLASS II, BMI 35-39.9: ICD-10-CM

## 2024-03-18 ENCOUNTER — OFFICE VISIT (OUTPATIENT)
Dept: INTERNAL MEDICINE | Facility: CLINIC | Age: 34
End: 2024-03-18
Payer: COMMERCIAL

## 2024-03-18 VITALS
BODY MASS INDEX: 34.69 KG/M2 | WEIGHT: 221 LBS | SYSTOLIC BLOOD PRESSURE: 110 MMHG | HEART RATE: 76 BPM | HEIGHT: 67 IN | DIASTOLIC BLOOD PRESSURE: 70 MMHG | OXYGEN SATURATION: 99 % | TEMPERATURE: 98.3 F | RESPIRATION RATE: 17 BRPM

## 2024-03-18 DIAGNOSIS — E66.9 OBESITY, CLASS I, BMI 30-34.9: Primary | ICD-10-CM

## 2024-03-18 DIAGNOSIS — F41.9 ANXIETY: ICD-10-CM

## 2024-03-18 DIAGNOSIS — R05.9 COUGH, UNSPECIFIED TYPE: ICD-10-CM

## 2024-03-18 PROCEDURE — 99213 OFFICE O/P EST LOW 20 MIN: CPT | Performed by: NURSE PRACTITIONER

## 2024-03-18 RX ORDER — METFORMIN HYDROCHLORIDE 500 MG/1
500 TABLET, EXTENDED RELEASE ORAL
Qty: 30 TABLET | Refills: 3 | Status: SHIPPED | OUTPATIENT
Start: 2024-03-18

## 2024-03-18 RX ORDER — TOPIRAMATE 15 MG/1
15 CAPSULE ORAL 2 TIMES DAILY
Qty: 60 CAPSULE | Refills: 1 | OUTPATIENT
Start: 2024-03-18

## 2024-03-18 RX ORDER — TOPIRAMATE 15 MG/1
15 CAPSULE ORAL 2 TIMES DAILY
Qty: 60 CAPSULE | Refills: 2 | Status: SHIPPED | OUTPATIENT
Start: 2024-03-18

## 2024-03-18 RX ORDER — METFORMIN HYDROCHLORIDE 500 MG/1
500 TABLET, EXTENDED RELEASE ORAL
Qty: 30 TABLET | Refills: 3 | Status: SHIPPED | OUTPATIENT
Start: 2024-03-18 | End: 2024-03-18 | Stop reason: SDUPTHER

## 2024-03-18 RX ORDER — METHYLPREDNISOLONE 4 MG/1
TABLET ORAL
Qty: 21 TABLET | Refills: 0 | Status: SHIPPED | OUTPATIENT
Start: 2024-03-18

## 2024-03-18 NOTE — TELEPHONE ENCOUNTER
Rx Refill Note  Requested Prescriptions     Pending Prescriptions Disp Refills    topiramate (TOPAMAX) 15 MG capsule (sprinkle) [Pharmacy Med Name: topiramate 15 mg sprinkle capsule] 60 capsule 1     Sig: TAKE ONE CAPSULE BY MOUTH TWICE DAILY      Last office visit with prescribing clinician: 12/18/2023   Last telemedicine visit with prescribing clinician: Visit date not found   Next office visit with prescribing clinician: 3/18/2024                         Would you like a call back once the refill request has been completed: [] Yes [] No    If the office needs to give you a call back, can they leave a voicemail: [] Yes [] No    Janene Acosta RN  03/18/24, 07:26 CDT

## 2024-03-18 NOTE — PROGRESS NOTES
Subjective     Chief Complaint   Patient presents with    Anxiety    Prediabetes       Anxiety  Patient reports no chest pain, confusion, dizziness, nausea, nervous/anxious behavior, palpitations or shortness of breath.       Patient is here for follow up on weight loss. She is taking Topamax 15 mg BID and metformin 500 mg once a day. She has lost 10 lbs. She has been on Topamax since December. She denies any nausea/vomiting or diarrhea.     Her anxiety is doing well. She is taking 100 mg Zoloft daily. She denies any problems.     She has a cough today. It has been going on for a few days. States she hasn't been around anyone that has been sick. The cough is not productive. She denies shortness of breath or wheezing.    Review of Systems   Constitutional:  Negative for activity change, appetite change, chills and fever.   HENT:  Negative for hearing loss, nosebleeds, tinnitus and trouble swallowing.    Eyes:  Negative for visual disturbance.   Respiratory:  Positive for cough. Negative for chest tightness, shortness of breath and wheezing.    Cardiovascular:  Negative for chest pain, palpitations and leg swelling.   Gastrointestinal:  Negative for abdominal distention, abdominal pain, blood in stool, constipation, diarrhea, nausea and vomiting.   Endocrine: Negative for cold intolerance, heat intolerance, polydipsia, polyphagia and polyuria.   Genitourinary:  Negative for decreased urine volume, difficulty urinating, dysuria, flank pain, frequency and hematuria.   Musculoskeletal:  Negative for arthralgias, joint swelling and myalgias.   Skin:  Negative for rash.   Allergic/Immunologic: Negative for immunocompromised state.   Neurological:  Negative for dizziness, syncope, weakness, light-headedness and headaches.   Hematological:  Negative for adenopathy. Does not bruise/bleed easily.   Psychiatric/Behavioral:  Negative for confusion and sleep disturbance. The patient is not nervous/anxious.       Otherwise  complete ROS reviewed and negative except as mentioned in the HPI.    Past Medical History:   Past Medical History:   Diagnosis Date    Anemia     Chronic kidney disease     stones    Deep vein thrombosis 2022    Depression 2022    Kidney stone     ESWL in 2016    Migraine     Stress related    Ovarian cyst     Pulmonary embolism 2022     Past Surgical History:  Past Surgical History:   Procedure Laterality Date     SECTION N/A 10/02/2017    Procedure:  SECTION PRIMARY;  Surgeon: Cecily Riggs MD;  Location: [facility] LABOR DELIVERY;  Service:      SECTION N/A 2020    Procedure: REPEAT  SECTION WITHOUT TUBAL LIGATION;  Surgeon: Cecily Riggs MD;  Location: [facility] LABOR DELIVERY;  Service: Obstetrics/Gynecology;  Laterality: N/A;    CLEFT LIP REPAIR      CLEFT PALATE REPAIR      8 surgeries    COSMETIC SURGERY      Cleft lip repair [id number]    HAND OPEN REDUCTION INTERNAL FIXATION      KNEE ARTHROSCOPY MENISCUS TRANSPLANT Right     KNEE ARTHROSCOPY W/ MENISCAL REPAIR Left     KNEE SURGERY      piece of femur broke off into knee     Social History:  reports that she has never smoked. She has never been exposed to tobacco smoke. She has never used smokeless tobacco. She reports that she does not drink alcohol and does not use drugs.    Family History: family history includes Alcohol abuse in her father; Birth defects in her father; Breast cancer in her paternal grandmother; Cancer in her maternal grandfather and paternal grandmother; Depression in her mother; Hypertension in her father; Melanoma in her paternal grandmother; Prostate cancer in her paternal grandfather; Thyroid disease in her mother.       Allergies:  Allergies   Allergen Reactions    Amoxicillin      reaction as a baby, pt told by mother not to take amoxicillin.    Imitrex [Sumatriptan] Nausea And Vomiting and Nausea Only    Penicillins Other (See Comments)     Makes her crazy - mean.   "    Medications:  Prior to Admission medications    Medication Sig Start Date End Date Taking? Authorizing Provider   metFORMIN ER (GLUCOPHAGE-XR) 500 MG 24 hr tablet Take 1 tablet by mouth Daily With Breakfast. 12/18/23   Cesilia Benítez APRN   naltrexone-bupropion ER (CONTRAVE) 8-90 MG tablet Wk 1: 1 tab daily, Wk 2: 1 tab twice a day, Wk 3: 2 tabs in AM, 1 tab in PM, Wk 4: 2 tabs twice a day, Maintenance dose: 2 tabs twice daily. 6/28/23   Cesilia Benítez APRN   sertraline (ZOLOFT) 100 MG tablet Take 1 tablet by mouth Daily. 12/1/23   Alberto Galeas MD   topiramate (TOPAMAX) 15 MG capsule (sprinkle) Take 1 capsule by mouth 2 (Two) Times a Day. 12/18/23   Cesilia Benítez APRN   sertraline (ZOLOFT) 50 MG tablet Take 50 mg by mouth Daily. 5/26/22 3/18/24  ProviderAlberto MD       Objective     Vital Signs: /70   Pulse 76   Temp 98.3 °F (36.8 °C)   Resp 17   Ht 170.2 cm (67\")   Wt 100 kg (221 lb)   SpO2 99%   BMI 34.61 kg/m²   Physical Exam  Vitals reviewed.   Constitutional:       Appearance: She is well-developed.   HENT:      Head: Normocephalic and atraumatic.   Eyes:      Pupils: Pupils are equal, round, and reactive to light.   Neck:      Vascular: No JVD.   Cardiovascular:      Rate and Rhythm: Normal rate and regular rhythm.      Heart sounds: Normal heart sounds.   Pulmonary:      Effort: Pulmonary effort is normal.      Breath sounds: Normal breath sounds.   Abdominal:      General: Bowel sounds are normal.      Palpations: Abdomen is soft.   Musculoskeletal:         General: No deformity.      Cervical back: Normal range of motion and neck supple.   Lymphadenopathy:      Cervical: No cervical adenopathy.   Skin:     General: Skin is warm and dry.   Neurological:      Mental Status: She is alert and oriented to person, place, and time.   Psychiatric:         Behavior: Behavior normal.         Thought Content: Thought content normal.         Judgment: " Judgment normal.       Results Reviewed:  Glucose   Date Value Ref Range Status   12/17/2023 92 70 - 99 mg/dL Final   12/13/2022 120 (H) 65 - 99 mg/dL Final     BUN   Date Value Ref Range Status   12/17/2023 10 6 - 20 mg/dL Final   12/13/2022 15 6 - 20 mg/dL Final     Creatinine   Date Value Ref Range Status   12/17/2023 0.77 0.57 - 1.00 mg/dL Final   12/13/2022 0.65 0.57 - 1.00 mg/dL Final     Sodium   Date Value Ref Range Status   12/17/2023 138 134 - 144 mmol/L Final   12/13/2022 139 136 - 145 mmol/L Final     Potassium   Date Value Ref Range Status   12/17/2023 4.3 3.5 - 5.2 mmol/L Final   12/13/2022 4.2 3.5 - 5.2 mmol/L Final     Chloride   Date Value Ref Range Status   12/17/2023 105 96 - 106 mmol/L Final   12/13/2022 105 98 - 107 mmol/L Final     CO2   Date Value Ref Range Status   12/13/2022 24.0 22.0 - 29.0 mmol/L Final     Total CO2   Date Value Ref Range Status   12/17/2023 21 20 - 29 mmol/L Final     Calcium   Date Value Ref Range Status   12/17/2023 8.9 8.7 - 10.2 mg/dL Final   12/13/2022 9.1 8.6 - 10.5 mg/dL Final     ALT (SGPT)   Date Value Ref Range Status   12/17/2023 31 0 - 32 IU/L Final   12/13/2022 31 1 - 33 U/L Final     AST (SGOT)   Date Value Ref Range Status   12/17/2023 33 0 - 40 IU/L Final   12/13/2022 37 (H) 1 - 32 U/L Final     WBC   Date Value Ref Range Status   12/17/2023 5.6 3.4 - 10.8 x10E3/uL Final   11/22/2022 5.2 4.8 - 10.8 K/uL Final     Hematocrit   Date Value Ref Range Status   12/17/2023 34.1 34.0 - 46.6 % Final   12/13/2022 38.2 34.0 - 46.6 % Final   11/22/2022 45.7 37.0 - 47.0 % Final     Platelets   Date Value Ref Range Status   12/17/2023 289 150 - 450 x10E3/uL Final   12/13/2022 209 140 - 450 10*3/mm3 Final   11/22/2022 263 130 - 400 K/uL Final     Total Cholesterol   Date Value Ref Range Status   12/12/2022 214 (H) 0 - 200 mg/dL Final     Triglycerides   Date Value Ref Range Status   12/17/2023 130 0 - 149 mg/dL Final   12/12/2022 135 0 - 150 mg/dL Final     HDL  Cholesterol   Date Value Ref Range Status   12/17/2023 44 >39 mg/dL Final   12/12/2022 47 40 - 60 mg/dL Final     LDL Chol Calc (NIH)   Date Value Ref Range Status   12/17/2023 102 (H) 0 - 99 mg/dL Final     LDL/HDL Ratio   Date Value Ref Range Status   12/12/2022 2.98  Final     Hemoglobin A1C   Date Value Ref Range Status   12/17/2023 5.8 (H) 4.8 - 5.6 % Final     Comment:              Prediabetes: 5.7 - 6.4           Diabetes: >6.4           Glycemic control for adults with diabetes: <7.0     12/12/2022 5.10 4.80 - 5.60 % Final         Assessment / Plan     Assessment/Plan:  Diagnoses and all orders for this visit:    1. Obesity, Class I, BMI 30-34.9 (Primary)  -     metFORMIN ER (GLUCOPHAGE-XR) 500 MG 24 hr tablet; Take 1 tablet by mouth Daily With Breakfast.  Dispense: 30 tablet; Refill: 3  -     topiramate (TOPAMAX) 15 MG capsule (sprinkle); Take 1 capsule by mouth 2 (Two) Times a Day.  Dispense: 60 capsule; Refill: 2  - Continue diet changes and exercise.     2. Anxiety       Continue 100 mg Zoloft daily.     3. Cough, unspecified type  -     methylPREDNISolone (MEDROL) 4 MG dose pack; Take as directed on package instructions.  Dispense: 21 tablet; Refill: 0      Return in about 6 months (around 9/18/2024). unless patient needs to be seen sooner or acute issues arise.    Code Status: Full.     I have discussed the patient results/orders and and plan/recommendation with them at today's visit.      Signed by:    YULIANA Wills Date: 03/18/24  Answers submitted by the patient for this visit:  Other (Submitted on 3/18/2024)  Please describe your symptoms.: Follow up visit  Have you had these symptoms before?: Yes  How long have you been having these symptoms?: Greater than 2 weeks  Primary Reason for Visit (Submitted on 3/18/2024)  What is the primary reason for your visit?: Other

## 2024-05-21 ENCOUNTER — OFFICE VISIT (OUTPATIENT)
Age: 34
End: 2024-05-21
Payer: COMMERCIAL

## 2024-05-21 VITALS
DIASTOLIC BLOOD PRESSURE: 78 MMHG | HEIGHT: 67 IN | BODY MASS INDEX: 35.31 KG/M2 | WEIGHT: 225 LBS | SYSTOLIC BLOOD PRESSURE: 122 MMHG

## 2024-05-21 DIAGNOSIS — N93.8 DUB (DYSFUNCTIONAL UTERINE BLEEDING): Primary | ICD-10-CM

## 2024-05-21 DIAGNOSIS — F32.5 MAJOR DEPRESSIVE DISORDER IN FULL REMISSION, UNSPECIFIED WHETHER RECURRENT: ICD-10-CM

## 2024-05-21 RX ORDER — SERTRALINE HYDROCHLORIDE 100 MG/1
100 TABLET, FILM COATED ORAL DAILY
Qty: 90 TABLET | Refills: 3 | Status: SHIPPED | OUTPATIENT
Start: 2024-05-21

## 2024-05-21 NOTE — PROGRESS NOTES
"Chief Complaint  Gynecologic Exam (Pt here for annual, previous Riggs pt, last pap 4-2023, no hx of abnormal paps, states ok not to have pap today, started period. Pt denies any problems. Pt request rf on Zoloft. Pt has hx of PE so not able to take birth control, complains of irregular painful periods.)    Subjective        Adina Harris presents to [facility] for annual exam. Pt however has started her menses and reports heavy VB today. Pt denies hx abnormal pap smears. Pt currently on zoloft for depression and is doing well. Needs refills.  Pt with increasingly heavy menses. Reports cycle every 20-28 days with 7 days of heavy flow. Was previously on OCPs for regulation, however had the PE and is limited. Pt reports they are becoming more painful.  Has not had a work up in the past.   History of Present Illness    Objective   Vital Signs:  /78   Ht 170.2 cm (67.01\")   Wt 102 kg (225 lb)   BMI 35.23 kg/m²   Estimated body mass index is 35.23 kg/m² as calculated from the following:    Height as of this encounter: 170.2 cm (67.01\").    Weight as of this encounter: 102 kg (225 lb).               Physical Exam   deferred  Result Review :                     Assessment and Plan     Diagnoses and all orders for this visit:    1. DUB (dysfunctional uterine bleeding) (Primary)  -     TSH Rfx On Abnormal To Free T4  -     Follicle Stimulating Hormone  -     Prolactin  -     CBC (No Diff)  Worsening DUB. Labs today. Pelvic US upon RTC for annual exam. Will discuss results at that time.   2. Major depressive disorder in full remission, unspecified whether recurrent  Depression controlled on zoloft. Refills for 1 year.   Other orders  -     sertraline (ZOLOFT) 100 MG tablet; Take 1 tablet by mouth Daily.  Dispense: 90 tablet; Refill: 3             Follow Up     Return in about 2 weeks (around 6/4/2024) for Pelvic US for DUB next visit.  Patient was given instructions and counseling " regarding her condition or for health maintenance advice. Please see specific information pulled into the AVS if appropriate.

## 2024-05-22 LAB
ERYTHROCYTE [DISTWIDTH] IN BLOOD BY AUTOMATED COUNT: 14.1 % (ref 11.7–15.4)
FSH SERPL-ACNC: 5.8 MIU/ML
HCT VFR BLD AUTO: 36.6 % (ref 34–46.6)
HGB BLD-MCNC: 12.2 G/DL (ref 11.1–15.9)
MCH RBC QN AUTO: 27.8 PG (ref 26.6–33)
MCHC RBC AUTO-ENTMCNC: 33.3 G/DL (ref 31.5–35.7)
MCV RBC AUTO: 83 FL (ref 79–97)
NRBC BLD AUTO-RTO: 1 % (ref 0–0)
PLATELET # BLD AUTO: 280 X10E3/UL (ref 150–450)
PROLACTIN SERPL-MCNC: 7.4 NG/ML (ref 4.8–33.4)
RBC # BLD AUTO: 4.39 X10E6/UL (ref 3.77–5.28)
TSH SERPL DL<=0.005 MIU/L-ACNC: 1.18 UIU/ML (ref 0.45–4.5)
WBC # BLD AUTO: 6.4 X10E3/UL (ref 3.4–10.8)

## 2024-06-06 ENCOUNTER — OFFICE VISIT (OUTPATIENT)
Age: 34
End: 2024-06-06
Payer: COMMERCIAL

## 2024-06-06 ENCOUNTER — TELEPHONE (OUTPATIENT)
Age: 34
End: 2024-06-06
Payer: COMMERCIAL

## 2024-06-06 VITALS
BODY MASS INDEX: 35.63 KG/M2 | SYSTOLIC BLOOD PRESSURE: 112 MMHG | WEIGHT: 227 LBS | DIASTOLIC BLOOD PRESSURE: 68 MMHG | HEIGHT: 67 IN

## 2024-06-06 DIAGNOSIS — N92.1 MENORRHAGIA WITH IRREGULAR CYCLE: Primary | ICD-10-CM

## 2024-06-06 RX ORDER — SODIUM CHLORIDE 9 MG/ML
100 INJECTION, SOLUTION INTRAVENOUS CONTINUOUS
OUTPATIENT
Start: 2024-06-06

## 2024-06-06 RX ORDER — SODIUM CHLORIDE 9 MG/ML
40 INJECTION, SOLUTION INTRAVENOUS AS NEEDED
OUTPATIENT
Start: 2024-06-06

## 2024-06-06 RX ORDER — SODIUM CHLORIDE 0.9 % (FLUSH) 0.9 %
1-10 SYRINGE (ML) INJECTION AS NEEDED
OUTPATIENT
Start: 2024-06-06

## 2024-06-06 RX ORDER — SODIUM CHLORIDE 0.9 % (FLUSH) 0.9 %
10 SYRINGE (ML) INJECTION EVERY 12 HOURS SCHEDULED
OUTPATIENT
Start: 2024-06-06

## 2024-06-06 NOTE — TELEPHONE ENCOUNTER
Called and informed pt of sx date of 07/25/2024 and of arrival time of 0500 and to be NPO after midnight.  Pt was also informed of appt with Dr. Caba on 07/10/2024 at 1115 with PAT to follow at 1145, pt voiced understanding.

## 2024-06-06 NOTE — PROGRESS NOTES
[facility]  Adina Harris  : 1990  MRN: [id number]  CSN: [id number]    History and Physical    Subjective   Adina Harris is a 33 y.o. year old  who presents for consultation about surgery due to menorrhagia. Pt with heavy menses lasting 7-10 days. Her cycle has shortened. Pt with prior PE on OCPs.  Pt is not interested in Mirena. Pt has tried progesterone without relief of symptoms in the past and is not interested in trying it again. Pt and I discussed endometrial ablation versus hysterectomy. Pt would like to proceed with definitive tx with TLH/bilateral salpingectomy. Pelvic US today with normal size uterus measuring 7.7 x 6.3 x 4.7 cm. Right ovary with a 3 cm complex cyst. Pt does report  severe cramping with menses and does hurt on the right side mid cycle.    Past Medical History:   Diagnosis Date    Anemia     Chronic kidney disease     stones    Deep vein thrombosis 2022    Depression 2022    Kidney stone     ESWL in     Migraine     Stress related    Ovarian cyst     Pulmonary embolism 2022     Past Surgical History:   Procedure Laterality Date     SECTION N/A 10/02/2017    Procedure:  SECTION PRIMARY;  Surgeon: Cecily Rigsg MD;  Location: [facility] LABOR DELIVERY;  Service:      SECTION N/A 2020    Procedure: REPEAT  SECTION WITHOUT TUBAL LIGATION;  Surgeon: Cecily Riggs MD;  Location: [facility] LABOR DELIVERY;  Service: Obstetrics/Gynecology;  Laterality: N/A;    CLEFT LIP REPAIR      CLEFT PALATE REPAIR      8 surgeries    COSMETIC SURGERY      Cleft lip repair [id number]    HAND OPEN REDUCTION INTERNAL FIXATION      KNEE ARTHROSCOPY MENISCUS TRANSPLANT Right     KNEE ARTHROSCOPY W/ MENISCAL REPAIR Left     KNEE SURGERY      piece of femur broke off into knee     Social History    Tobacco Use      Smoking status: Never        Passive exposure: Never      Smokeless tobacco: Never      Current Outpatient  "Medications:     metFORMIN ER (GLUCOPHAGE-XR) 500 MG 24 hr tablet, Take 1 tablet by mouth Daily With Breakfast., Disp: 30 tablet, Rfl: 3    sertraline (ZOLOFT) 100 MG tablet, Take 1 tablet by mouth Daily., Disp: 90 tablet, Rfl: 3    topiramate (TOPAMAX) 15 MG capsule (sprinkle), Take 1 capsule by mouth 2 (Two) Times a Day., Disp: 60 capsule, Rfl: 2    Allergies   Allergen Reactions    Amoxicillin      reaction as a baby, pt told by mother not to take amoxicillin.    Imitrex [Sumatriptan] Nausea And Vomiting and Nausea Only    Penicillins Other (See Comments)     Makes her crazy - mean.        Family History   Problem Relation Age of Onset    Hypertension Father     Alcohol abuse Father     Birth defects Father         Bilateral Cleft Lip    Prostate cancer Paternal Grandfather     Breast cancer Paternal Grandmother     Melanoma Paternal Grandmother     Cancer Paternal Grandmother         Breast, Skin, and Pancreatic cancers    Depression Mother     Thyroid disease Mother     Cancer Maternal Grandfather         Prostate cancer     Review of Systems   Constitutional:  Negative for activity change, appetite change, fatigue and unexpected weight change.   Respiratory:  Negative for cough, choking, chest tightness, shortness of breath and wheezing.    Cardiovascular:  Negative for chest pain, palpitations and leg swelling.   Gastrointestinal:  Negative for abdominal pain, blood in stool, constipation, diarrhea, nausea and vomiting.   Genitourinary:  Positive for menstrual problem, pelvic pain and vaginal bleeding. Negative for dyspareunia, dysuria, vaginal discharge and vaginal pain.         Objective   /68 (Patient Position: Sitting, Cuff Size: Large Adult)   Ht 170.2 cm (67.01\")   Wt 103 kg (227 lb)   LMP 05/21/2024 (Exact Date)   Breastfeeding No   BMI 35.54 kg/m²     Physical Exam   Physical Exam  Constitutional:       Appearance: Normal appearance.   Cardiovascular:      Rate and Rhythm: Normal rate and " regular rhythm.      Pulses: Normal pulses.      Heart sounds: Normal heart sounds.   Pulmonary:      Effort: Pulmonary effort is normal.      Breath sounds: Normal breath sounds.   Abdominal:      General: Abdomen is flat. Bowel sounds are normal.      Palpations: Abdomen is soft.   Musculoskeletal:      Cervical back: Normal range of motion and neck supple.   Neurological:      Mental Status: She is alert.   Psychiatric:         Mood and Affect: Mood normal.         Thought Content: Thought content normal.         Judgment: Judgment normal.         Labs  Lab Results   Component Value Date     05/21/2024    HGB 12.2 05/21/2024    HCT 36.6 05/21/2024    WBC 6.4 05/21/2024     12/17/2023    K 4.3 12/17/2023     12/17/2023    CO2 21 12/17/2023    BUN 10 12/17/2023    CREATININE 0.77 12/17/2023    GLUCOSE 92 12/17/2023    ALBUMIN 4.1 12/17/2023    CALCIUM 8.9 12/17/2023    AST 33 12/17/2023    ALT 31 12/17/2023    BILITOT 0.2 12/17/2023        Assessment & Plan    Diagnoses and all orders for this visit:    1. Menorrhagia with irregular cycle (Primary)  Pelvic pain/Menorrhagia with hx PE. Pt has tried po progesterone in the past. Is not interested in Mirena IUD.  Pt with 2 prior c/s and is not interested in endometrial ablation. Pt would like to proceed with definitive tx with TLH/bilateral salpingectomy robotic.  Pt cannot use hormonal manipulation due to prior PE.  Pt would like to wait until after her vacation. Will proceed 7/25. Discussed risks with TLH including infection, bleeding, damage to surrounding organs.       Cesilia Caba MD  6/6/2024  12:48 CDT

## 2024-06-28 ENCOUNTER — TELEPHONE (OUTPATIENT)
Age: 34
End: 2024-06-28
Payer: COMMERCIAL

## 2024-06-28 NOTE — TELEPHONE ENCOUNTER
Called to f/u with pt and see if she could r/s her sx from 7/25 to 7/16, no answer, left voicemail to return my call at her earliest convenience.

## 2024-07-10 ENCOUNTER — PRE-ADMISSION TESTING (OUTPATIENT)
Dept: PREADMISSION TESTING | Facility: HOSPITAL | Age: 34
End: 2024-07-10
Payer: COMMERCIAL

## 2024-07-10 ENCOUNTER — OFFICE VISIT (OUTPATIENT)
Age: 34
End: 2024-07-10
Payer: COMMERCIAL

## 2024-07-10 VITALS
OXYGEN SATURATION: 94 % | WEIGHT: 231.48 LBS | DIASTOLIC BLOOD PRESSURE: 76 MMHG | HEART RATE: 68 BPM | BODY MASS INDEX: 35.08 KG/M2 | RESPIRATION RATE: 16 BRPM | HEIGHT: 68 IN | SYSTOLIC BLOOD PRESSURE: 121 MMHG

## 2024-07-10 VITALS
WEIGHT: 229 LBS | SYSTOLIC BLOOD PRESSURE: 120 MMHG | DIASTOLIC BLOOD PRESSURE: 74 MMHG | HEIGHT: 67 IN | BODY MASS INDEX: 35.94 KG/M2

## 2024-07-10 DIAGNOSIS — N92.1 MENORRHAGIA WITH IRREGULAR CYCLE: Primary | ICD-10-CM

## 2024-07-10 PROCEDURE — 85025 COMPLETE CBC W/AUTO DIFF WBC: CPT | Performed by: OBSTETRICS & GYNECOLOGY

## 2024-07-10 PROCEDURE — 80048 BASIC METABOLIC PNL TOTAL CA: CPT | Performed by: OBSTETRICS & GYNECOLOGY

## 2024-07-10 PROCEDURE — 93005 ELECTROCARDIOGRAM TRACING: CPT

## 2024-07-10 NOTE — DISCHARGE INSTRUCTIONS
Preparing for Surgery  Follow these instructions before the procedure:  Several days or weeks before your procedure      Ask your health care provider about:  Changing or stopping your regular medicines. This is especially important if you are taking diabetes medicines or blood thinners.  Taking medicines such as aspirin and ibuprofen. These medicines can thin your blood. Do not take these medicines unless your health care provider tells you to take them.  Taking over-the-counter medicines, vitamins, herbs, and supplements.    Contact your surgeon if you:  Develop a fever of more than 100.4°F (38°C) or other feelings of illness during the 48 hours before your surgery.  Have symptoms that get worse.  Have questions or concerns about your surgery.  If you are going home the same day of your surgery you will need to arrange for a responsible adult, age 18 years old or older, to drive you home from the hospital and stay with you for 24 hours. Verification of the  will be made prior to any procedure requiring sedation. You may not go home in a taxi or any form of public transportation by yourself.     Day before your procedure    24 hours before your procedure DO NOT drink alcoholic beverages or smoke.  24 hours before your procedure STOP taking Erectile Dysfunction medication (i.e.,Cialis, Viagra)   You may be asked to shower with a germ-killing soap.  Day of your procedure   You may take the following medication(s) the morning of surgery with a sip of water: AS DIRECTED BY YOUR PHYSICIAN      8 hours before your procedure STOP all food, any dairy products, and full liquids. This includes hard candy, chewing gum or mints. This is extremely important to prevent serious complications.     Up to 2 hours before your scheduled arrival time, you may have clear liquids no cream, powder, or pulp of any kind. Safe options are water, black coffee, plain tea, soda, Gatorade/Powerade, clear broth, apple juice.    2 hours  before your scheduled arrival time, STOP drinking clear liquids.    You may need to take another shower with a germ-killing soap before you leave home in the morning. Do not use perfumes, colognes, or body lotions.  Wear comfortable loose-fitting clothing.  Remove all jewelry including body piercing and rings, dark colored nail polish, and make up prior to arrival at the hospital. Leave all valuables at home.   Bring your hearing aids if you rely on them.  Do not wear contact lenses. If you wear eyeglasses remember to bring a case to store them in while you are in surgery.  Do not use denture adhesives since you will be asked to remove them during your surgery.    You do not need to bring your home medications into the hospital.   Bring your sleep apnea device with you on the day of your surgery (if this applies to you).  If you wear portable oxygen, bring it with you.   If you are staying overnight, you may bring a bag of items you may need such as slippers, robe and a change of clothes for your discharge. You may want to leave these items in the car until you are ready for them since your family will take your belongings when you leave the pre-operative area.  Arrive at the hospital as scheduled by the office. You will be asked to arrive 2 hours prior to your surgery time in order to prepare for your procedure.  When you arrive at the hospital  Go to the registration desk located at the main entrance of the hospital.  After registration is completed, you will be given a beeper and a sticker sheet. Take the stickers to Outpatient Surgery and place in the tray at the end of the desk to notify the staff that you have arrived and registered.   Return to the lobby to wait. You are not always called back according to the time of arrival but rather the time your doctor will be ready.  When your beeper lights up and vibrates proceed through the double doors, under the stairs, and a member of the Outpatient Surgery staff  will escort you to your preoperative room.   How to Use Chlorhexidine Before Surgery  Chlorhexidine gluconate (CHG) is a germ-killing (antiseptic) solution that is used to clean the skin. It can get rid of the bacteria that normally live on the skin and can keep them away for about 24 hours. To clean your skin with CHG, you may be given:  A CHG solution to use in the shower or as part of a sponge bath.  A prepackaged cloth that contains CHG.  Cleaning your skin with CHG may help lower the risk for infection:  While you are staying in the intensive care unit of the hospital.  If you have a vascular access, such as a central line, to provide short-term or long-term access to your veins.  If you have a catheter to drain urine from your bladder.  If you are on a ventilator. A ventilator is a machine that helps you breathe by moving air in and out of your lungs.  After surgery.  What are the risks?  Risks of using CHG include:  A skin reaction.  Hearing loss, if CHG gets in your ears and you have a perforated eardrum.  Eye injury, if CHG gets in your eyes and is not rinsed out.  The CHG product catching fire.  Make sure that you avoid smoking and flames after applying CHG to your skin.  Do not use CHG:  If you have a chlorhexidine allergy or have previously reacted to chlorhexidine.  On babies younger than 2 months of age.  How to use CHG solution  Use CHG only as told by your health care provider, and follow the instructions on the label.  Use the full amount of CHG as directed. Usually, this is one bottle.  During a shower    Follow these steps when using CHG solution during a shower (unless your health care provider gives you different instructions):  Start the shower.  Use your normal soap and shampoo to wash your face and hair.  Turn off the shower or move out of the shower stream.  Pour the CHG onto a clean washcloth. Do not use any type of brush or rough-edged sponge.  Starting at your neck, lather your body down  to your toes. Make sure you follow these instructions:  If you will be having surgery, pay special attention to the part of your body where you will be having surgery. Scrub this area for at least 1 minute.  Do not use CHG on your head or face. If the solution gets into your ears or eyes, rinse them well with water.  Avoid your genital area.  Avoid any areas of skin that have broken skin, cuts, or scrapes.  Scrub your back and under your arms. Make sure to wash skin folds.  Let the lather sit on your skin for 1-2 minutes or as long as told by your health care provider.  Thoroughly rinse your entire body in the shower. Make sure that all body creases and crevices are rinsed well.  Dry off with a clean towel. Do not put any substances on your body afterward--such as powder, lotion, or perfume--unless you are told to do so by your health care provider. Only use lotions that are recommended by the .  Put on clean clothes or pajamas.  If it is the night before your surgery, sleep in clean sheets.     During a sponge bath  Follow these steps when using CHG solution during a sponge bath (unless your health care provider gives you different instructions):  Use your normal soap and shampoo to wash your face and hair.  Pour the CHG onto a clean washcloth.  Starting at your neck, lather your body down to your toes. Make sure you follow these instructions:  If you will be having surgery, pay special attention to the part of your body where you will be having surgery. Scrub this area for at least 1 minute.  Do not use CHG on your head or face. If the solution gets into your ears or eyes, rinse them well with water.  Avoid your genital area.  Avoid any areas of skin that have broken skin, cuts, or scrapes.  Scrub your back and under your arms. Make sure to wash skin folds.  Let the lather sit on your skin for 1-2 minutes or as long as told by your health care provider.  Using a different clean, wet washcloth,  thoroughly rinse your entire body. Make sure that all body creases and crevices are rinsed well.  Dry off with a clean towel. Do not put any substances on your body afterward--such as powder, lotion, or perfume--unless you are told to do so by your health care provider. Only use lotions that are recommended by the .  Put on clean clothes or pajamas.  If it is the night before your surgery, sleep in clean sheets.  How to use CHG prepackaged cloths  Only use CHG cloths as told by your health care provider, and follow the instructions on the label.  Use the CHG cloth on clean, dry skin.  Do not use the CHG cloth on your head or face unless your health care provider tells you to.  When washing with the CHG cloth:  Avoid your genital area.  Avoid any areas of skin that have broken skin, cuts, or scrapes.  Before surgery    Follow these steps when using a CHG cloth to clean before surgery (unless your health care provider gives you different instructions):  Using the CHG cloth, vigorously scrub the part of your body where you will be having surgery. Scrub using a back-and-forth motion for 3 minutes. The area on your body should be completely wet with CHG when you are done scrubbing.  Do not rinse. Discard the cloth and let the area air-dry. Do not put any substances on the area afterward, such as powder, lotion, or perfume.  Put on clean clothes or pajamas.  If it is the night before your surgery, sleep in clean sheets.     For general bathing  Follow these steps when using CHG cloths for general bathing (unless your health care provider gives you different instructions).  Use a separate CHG cloth for each area of your body. Make sure you wash between any folds of skin and between your fingers and toes. Wash your body in the following order, switching to a new cloth after each step:  The front of your neck, shoulders, and chest.  Both of your arms, under your arms, and your hands.  Your stomach and groin area,  avoiding the genitals.  Your right leg and foot.  Your left leg and foot.  The back of your neck, your back, and your buttocks.  Do not rinse. Discard the cloth and let the area air-dry. Do not put any substances on your body afterward--such as powder, lotion, or perfume--unless you are told to do so by your health care provider. Only use lotions that are recommended by the .  Put on clean clothes or pajamas.  Contact a health care provider if:  Your skin gets irritated after scrubbing.  You have questions about using your solution or cloth.  You swallow any chlorhexidine. Call your local poison control center (1-213.616.8700 in the [place]  Get help right away if:  Your eyes itch badly, or they become very red or swollen.  Your skin itches badly and is red or swollen.  Your hearing changes.  You have trouble seeing.  You have swelling or tingling in your mouth or throat.  You have trouble breathing.  These symptoms may represent a serious problem that is an emergency. Do not wait to see if the symptoms will go away. Get medical help right away. Call your local emergency services (715 in the [place] Do not drive yourself to the hospital.  Summary  Chlorhexidine gluconate (CHG) is a germ-killing (antiseptic) solution that is used to clean the skin. Cleaning your skin with CHG may help to lower your risk for infection.  You may be given CHG to use for bathing. It may be in a bottle or in a prepackaged cloth to use on your skin. Carefully follow your health care provider's instructions and the instructions on the product label.  Do not use CHG if you have a chlorhexidine allergy.  Contact your health care provider if your skin gets irritated after scrubbing.  This information is not intended to replace advice given to you by your health care provider. Make sure you discuss any questions you have with your health care provider.  Document Revised: 04/17/2023 Document Reviewed: 02/28/2022  Elsevier Patient  Education © 2023 [employer]

## 2024-07-10 NOTE — H&P (VIEW-ONLY)
[facility]  Adina Harris  : 1990  MRN: [id number]  CSN: [id number]    History and Physical    Subjective   Adina Harris is a 34 y.o. year old  who presents for consultation about surgery due to menorrhagia with irregular cycle. Pt with prior PE on OCPs therefore cannot use contraception for control. Pt with 2 prior c/s and would like to avoid endometrial ablation. Pt would like to proceed with definitive tx with Tlh/bilateral salpignectomy.  Surgery scheduled on .    Past Medical History:   Diagnosis Date    Anemia     Chronic kidney disease     stones    Deep vein thrombosis 2022    Depression 2022    Diabetes mellitus     GERD (gastroesophageal reflux disease)     Kidney stone     ESWL in     Migraine     Stress related    Ovarian cyst     Pulmonary embolism 2022     Past Surgical History:   Procedure Laterality Date     SECTION N/A 10/02/2017    Procedure:  SECTION PRIMARY;  Surgeon: Cecily Riggs MD;  Location: [facility] LABOR DELIVERY;  Service:      SECTION N/A 2020    Procedure: REPEAT  SECTION WITHOUT TUBAL LIGATION;  Surgeon: Cecily Riggs MD;  Location: [facility] LABOR DELIVERY;  Service: Obstetrics/Gynecology;  Laterality: N/A;    CLEFT LIP REPAIR      CLEFT PALATE REPAIR      8 surgeries    COSMETIC SURGERY      Cleft lip repair [id number]    HAND OPEN REDUCTION INTERNAL FIXATION      KNEE ARTHROSCOPY MENISCUS TRANSPLANT Right     KNEE ARTHROSCOPY W/ MENISCAL REPAIR Left     KNEE SURGERY      piece of femur broke off into knee     Social History    Tobacco Use      Smoking status: Never        Passive exposure: Never      Smokeless tobacco: Never      Current Outpatient Medications:     metFORMIN ER (GLUCOPHAGE-XR) 500 MG 24 hr tablet, Take 1 tablet by mouth Daily With Breakfast. (Patient taking differently: Take 1 tablet by mouth Daily With Breakfast. FOR WEIGHT LOSS AND STATES SHE IS PRE DIABETIC), Disp: 30  "tablet, Rfl: 3    sertraline (ZOLOFT) 100 MG tablet, Take 1 tablet by mouth Daily., Disp: 90 tablet, Rfl: 3    topiramate (TOPAMAX) 15 MG capsule (sprinkle), Take 1 capsule by mouth 2 (Two) Times a Day. (Patient taking differently: Take 1 capsule by mouth 2 (Two) Times a Day. WITH METFORMIN FOR WEIGHT LOSS), Disp: 60 capsule, Rfl: 2    Allergies   Allergen Reactions    Amoxicillin      reaction as a baby, pt told by mother not to take amoxicillin.    Imitrex [Sumatriptan] Nausea And Vomiting and Nausea Only    Penicillins Other (See Comments)     Makes her crazy - mean.        Family History   Problem Relation Age of Onset    Hypertension Father     Alcohol abuse Father     Birth defects Father         Bilateral Cleft Lip    Prostate cancer Paternal Grandfather     Breast cancer Paternal Grandmother     Melanoma Paternal Grandmother     Cancer Paternal Grandmother         Breast, Skin, and Pancreatic cancers    Depression Mother     Thyroid disease Mother     Cancer Maternal Grandfather         Prostate cancer     Review of Systems   Constitutional:  Negative for activity change, appetite change, fatigue and unexpected weight change.   Respiratory:  Negative for choking, chest tightness, shortness of breath and wheezing.    Cardiovascular:  Negative for chest pain and palpitations.   Gastrointestinal:  Negative for abdominal pain, constipation, diarrhea, nausea and vomiting.   Genitourinary:  Negative for dyspareunia, dysuria, frequency, menstrual problem, pelvic pain, vaginal bleeding, vaginal discharge and vaginal pain.         Objective   /74   Ht 170.2 cm (67\")   Wt 104 kg (229 lb)   LMP 06/15/2024 (Exact Date)   BMI 35.87 kg/m²     Physical Exam   Physical Exam  Constitutional:       Appearance: Normal appearance.   Cardiovascular:      Rate and Rhythm: Normal rate and regular rhythm.      Pulses: Normal pulses.      Heart sounds: Normal heart sounds.   Pulmonary:      Effort: Pulmonary effort is " normal.      Breath sounds: Normal breath sounds.   Abdominal:      General: Abdomen is flat. Bowel sounds are normal.      Palpations: Abdomen is soft.   Musculoskeletal:      Cervical back: Normal range of motion and neck supple.   Neurological:      Mental Status: She is alert.         Labs  Lab Results   Component Value Date     07/10/2024    HGB 11.7 (L) 07/10/2024    HCT 35.7 07/10/2024    WBC 4.74 07/10/2024     12/17/2023    K 4.3 12/17/2023     12/17/2023    CO2 21 12/17/2023    BUN 10 12/17/2023    CREATININE 0.77 12/17/2023    GLUCOSE 92 12/17/2023    ALBUMIN 4.1 12/17/2023    CALCIUM 8.9 12/17/2023    AST 33 12/17/2023    ALT 31 12/17/2023    BILITOT 0.2 12/17/2023        Assessment & Plan    Diagnoses and all orders for this visit:    1. Menorrhagia with irregular cycle (Primary)  Menorrhagia with irregular cycle. Pt opts for definitive tx with TLH/bilateral salpingectomy. Pt and I have discussed risks including infection, bleeding, thrombotic events, damage to surrounding organs. TO the OR on 7/25.       Cesilia Caba MD  7/10/2024  13:26 CDT

## 2024-07-10 NOTE — PROGRESS NOTES
[facility]  Adina Harris  : 1990  MRN: [id number]  CSN: [id number]    History and Physical    Subjective   Adina Harris is a 34 y.o. year old  who presents for consultation about surgery due to menorrhagia with irregular cycle. Pt with prior PE on OCPs therefore cannot use contraception for control. Pt with 2 prior c/s and would like to avoid endometrial ablation. Pt would like to proceed with definitive tx with Tlh/bilateral salpignectomy.  Surgery scheduled on .    Past Medical History:   Diagnosis Date    Anemia     Chronic kidney disease     stones    Deep vein thrombosis 2022    Depression 2022    Diabetes mellitus     GERD (gastroesophageal reflux disease)     Kidney stone     ESWL in     Migraine     Stress related    Ovarian cyst     Pulmonary embolism 2022     Past Surgical History:   Procedure Laterality Date     SECTION N/A 10/02/2017    Procedure:  SECTION PRIMARY;  Surgeon: Cecily Riggs MD;  Location: [facility] LABOR DELIVERY;  Service:      SECTION N/A 2020    Procedure: REPEAT  SECTION WITHOUT TUBAL LIGATION;  Surgeon: Cecily Riggs MD;  Location: [facility] LABOR DELIVERY;  Service: Obstetrics/Gynecology;  Laterality: N/A;    CLEFT LIP REPAIR      CLEFT PALATE REPAIR      8 surgeries    COSMETIC SURGERY      Cleft lip repair [id number]    HAND OPEN REDUCTION INTERNAL FIXATION      KNEE ARTHROSCOPY MENISCUS TRANSPLANT Right     KNEE ARTHROSCOPY W/ MENISCAL REPAIR Left     KNEE SURGERY      piece of femur broke off into knee     Social History    Tobacco Use      Smoking status: Never        Passive exposure: Never      Smokeless tobacco: Never      Current Outpatient Medications:     metFORMIN ER (GLUCOPHAGE-XR) 500 MG 24 hr tablet, Take 1 tablet by mouth Daily With Breakfast. (Patient taking differently: Take 1 tablet by mouth Daily With Breakfast. FOR WEIGHT LOSS AND STATES SHE IS PRE DIABETIC), Disp: 30  "tablet, Rfl: 3    sertraline (ZOLOFT) 100 MG tablet, Take 1 tablet by mouth Daily., Disp: 90 tablet, Rfl: 3    topiramate (TOPAMAX) 15 MG capsule (sprinkle), Take 1 capsule by mouth 2 (Two) Times a Day. (Patient taking differently: Take 1 capsule by mouth 2 (Two) Times a Day. WITH METFORMIN FOR WEIGHT LOSS), Disp: 60 capsule, Rfl: 2    Allergies   Allergen Reactions    Amoxicillin      reaction as a baby, pt told by mother not to take amoxicillin.    Imitrex [Sumatriptan] Nausea And Vomiting and Nausea Only    Penicillins Other (See Comments)     Makes her crazy - mean.        Family History   Problem Relation Age of Onset    Hypertension Father     Alcohol abuse Father     Birth defects Father         Bilateral Cleft Lip    Prostate cancer Paternal Grandfather     Breast cancer Paternal Grandmother     Melanoma Paternal Grandmother     Cancer Paternal Grandmother         Breast, Skin, and Pancreatic cancers    Depression Mother     Thyroid disease Mother     Cancer Maternal Grandfather         Prostate cancer     Review of Systems   Constitutional:  Negative for activity change, appetite change, fatigue and unexpected weight change.   Respiratory:  Negative for choking, chest tightness, shortness of breath and wheezing.    Cardiovascular:  Negative for chest pain and palpitations.   Gastrointestinal:  Negative for abdominal pain, constipation, diarrhea, nausea and vomiting.   Genitourinary:  Negative for dyspareunia, dysuria, frequency, menstrual problem, pelvic pain, vaginal bleeding, vaginal discharge and vaginal pain.         Objective   /74   Ht 170.2 cm (67\")   Wt 104 kg (229 lb)   LMP 06/15/2024 (Exact Date)   BMI 35.87 kg/m²     Physical Exam   Physical Exam  Constitutional:       Appearance: Normal appearance.   Cardiovascular:      Rate and Rhythm: Normal rate and regular rhythm.      Pulses: Normal pulses.      Heart sounds: Normal heart sounds.   Pulmonary:      Effort: Pulmonary effort is " normal.      Breath sounds: Normal breath sounds.   Abdominal:      General: Abdomen is flat. Bowel sounds are normal.      Palpations: Abdomen is soft.   Musculoskeletal:      Cervical back: Normal range of motion and neck supple.   Neurological:      Mental Status: She is alert.         Labs  Lab Results   Component Value Date     07/10/2024    HGB 11.7 (L) 07/10/2024    HCT 35.7 07/10/2024    WBC 4.74 07/10/2024     12/17/2023    K 4.3 12/17/2023     12/17/2023    CO2 21 12/17/2023    BUN 10 12/17/2023    CREATININE 0.77 12/17/2023    GLUCOSE 92 12/17/2023    ALBUMIN 4.1 12/17/2023    CALCIUM 8.9 12/17/2023    AST 33 12/17/2023    ALT 31 12/17/2023    BILITOT 0.2 12/17/2023        Assessment & Plan    Diagnoses and all orders for this visit:    1. Menorrhagia with irregular cycle (Primary)  Menorrhagia with irregular cycle. Pt opts for definitive tx with TLH/bilateral salpingectomy. Pt and I have discussed risks including infection, bleeding, thrombotic events, damage to surrounding organs. TO the OR on 7/25.       Cesilia Caba MD  7/10/2024  13:26 CDT

## 2024-07-12 LAB
QT INTERVAL: 390 MS
QTC INTERVAL: 412 MS

## 2024-07-25 ENCOUNTER — HOSPITAL ENCOUNTER (OUTPATIENT)
Facility: HOSPITAL | Age: 34
Setting detail: HOSPITAL OUTPATIENT SURGERY
Discharge: HOME | End: 2024-07-25
Attending: OBSTETRICS & GYNECOLOGY | Admitting: OBSTETRICS & GYNECOLOGY
Payer: COMMERCIAL

## 2024-07-25 ENCOUNTER — ANESTHESIA (OUTPATIENT)
Dept: PERIOP | Facility: HOSPITAL | Age: 34
End: 2024-07-25
Payer: COMMERCIAL

## 2024-07-25 ENCOUNTER — ANESTHESIA EVENT (OUTPATIENT)
Dept: PERIOP | Facility: HOSPITAL | Age: 34
End: 2024-07-25
Payer: COMMERCIAL

## 2024-07-25 VITALS
HEART RATE: 93 BPM | TEMPERATURE: 98.2 F | RESPIRATION RATE: 18 BRPM | DIASTOLIC BLOOD PRESSURE: 72 MMHG | SYSTOLIC BLOOD PRESSURE: 123 MMHG | OXYGEN SATURATION: 96 %

## 2024-07-25 DIAGNOSIS — N92.1 MENORRHAGIA WITH IRREGULAR CYCLE: ICD-10-CM

## 2024-07-25 DIAGNOSIS — G89.18 POST-OP PAIN: Primary | ICD-10-CM

## 2024-07-25 LAB
ABO GROUP BLD: NORMAL
B-HCG UR QL: NEGATIVE
BLD GP AB SCN SERPL QL: NEGATIVE
GLUCOSE BLDC GLUCOMTR-MCNC: 135 MG/DL (ref 70–130)
GLUCOSE BLDC GLUCOMTR-MCNC: 97 MG/DL (ref 70–130)
RH BLD: POSITIVE
T&S EXPIRATION DATE: NORMAL

## 2024-07-25 PROCEDURE — 25010000002 DEXAMETHASONE PER 1 MG: Performed by: NURSE ANESTHETIST, CERTIFIED REGISTERED

## 2024-07-25 PROCEDURE — 25010000002 MIDAZOLAM PER 1 MG: Performed by: ANESTHESIOLOGY

## 2024-07-25 PROCEDURE — 25810000003 LACTATED RINGERS PER 1000 ML: Performed by: OBSTETRICS & GYNECOLOGY

## 2024-07-25 PROCEDURE — 25010000002 ONDANSETRON PER 1 MG: Performed by: NURSE ANESTHETIST, CERTIFIED REGISTERED

## 2024-07-25 PROCEDURE — 58571 TLH W/T/O 250 G OR LESS: CPT | Performed by: OBSTETRICS & GYNECOLOGY

## 2024-07-25 PROCEDURE — 25010000002 SUGAMMADEX 200 MG/2ML SOLUTION: Performed by: NURSE ANESTHETIST, CERTIFIED REGISTERED

## 2024-07-25 PROCEDURE — 25010000002 HYDROMORPHONE PER 4 MG: Performed by: ANESTHESIOLOGY

## 2024-07-25 PROCEDURE — 25010000002 GLYCOPYRROLATE 0.4 MG/2ML SOLUTION: Performed by: NURSE ANESTHETIST, CERTIFIED REGISTERED

## 2024-07-25 PROCEDURE — 81025 URINE PREGNANCY TEST: CPT | Performed by: OBSTETRICS & GYNECOLOGY

## 2024-07-25 PROCEDURE — 25010000002 BUPIVACAINE 0.25 % SOLUTION: Performed by: OBSTETRICS & GYNECOLOGY

## 2024-07-25 PROCEDURE — 25010000002 CEFAZOLIN PER 500 MG: Performed by: OBSTETRICS & GYNECOLOGY

## 2024-07-25 PROCEDURE — 25010000002 DROPERIDOL PER 5 MG: Performed by: ANESTHESIOLOGY

## 2024-07-25 PROCEDURE — 25010000002 FENTANYL CITRATE (PF) 50 MCG/ML SOLUTION: Performed by: ANESTHESIOLOGY

## 2024-07-25 PROCEDURE — 25010000002 KETOROLAC TROMETHAMINE PER 15 MG: Performed by: NURSE ANESTHETIST, CERTIFIED REGISTERED

## 2024-07-25 PROCEDURE — 25010000002 PROPOFOL 10 MG/ML EMULSION: Performed by: NURSE ANESTHETIST, CERTIFIED REGISTERED

## 2024-07-25 PROCEDURE — 86850 RBC ANTIBODY SCREEN: CPT | Performed by: OBSTETRICS & GYNECOLOGY

## 2024-07-25 PROCEDURE — 82948 REAGENT STRIP/BLOOD GLUCOSE: CPT

## 2024-07-25 PROCEDURE — 88307 TISSUE EXAM BY PATHOLOGIST: CPT | Performed by: OBSTETRICS & GYNECOLOGY

## 2024-07-25 PROCEDURE — 86901 BLOOD TYPING SEROLOGIC RH(D): CPT | Performed by: OBSTETRICS & GYNECOLOGY

## 2024-07-25 PROCEDURE — 25010000002 DEXAMETHASONE PER 1 MG: Performed by: ANESTHESIOLOGY

## 2024-07-25 PROCEDURE — 86900 BLOOD TYPING SEROLOGIC ABO: CPT | Performed by: OBSTETRICS & GYNECOLOGY

## 2024-07-25 PROCEDURE — 25010000002 FENTANYL CITRATE (PF) 100 MCG/2ML SOLUTION: Performed by: NURSE ANESTHETIST, CERTIFIED REGISTERED

## 2024-07-25 DEVICE — DEV CONTRL TISS STRATAFIX SPIRAL PDS PLUS SZ0 CT/2 30CM VIL: Type: IMPLANTABLE DEVICE | Site: ABDOMEN | Status: FUNCTIONAL

## 2024-07-25 RX ORDER — GLYCOPYRROLATE 0.2 MG/ML
INJECTION INTRAMUSCULAR; INTRAVENOUS AS NEEDED
Status: DISCONTINUED | OUTPATIENT
Start: 2024-07-25 | End: 2024-07-25 | Stop reason: SURG

## 2024-07-25 RX ORDER — OXYCODONE HYDROCHLORIDE AND ACETAMINOPHEN 5; 325 MG/1; MG/1
1 TABLET ORAL EVERY 6 HOURS PRN
Qty: 12 TABLET | Refills: 0 | Status: SHIPPED | OUTPATIENT
Start: 2024-07-25

## 2024-07-25 RX ORDER — DEXAMETHASONE SODIUM PHOSPHATE 4 MG/ML
4 INJECTION, SOLUTION INTRA-ARTICULAR; INTRALESIONAL; INTRAMUSCULAR; INTRAVENOUS; SOFT TISSUE ONCE AS NEEDED
Status: COMPLETED | OUTPATIENT
Start: 2024-07-25 | End: 2024-07-25

## 2024-07-25 RX ORDER — SODIUM CHLORIDE 9 MG/ML
100 INJECTION, SOLUTION INTRAVENOUS CONTINUOUS
Status: DISCONTINUED | OUTPATIENT
Start: 2024-07-25 | End: 2024-07-25 | Stop reason: HOSPADM

## 2024-07-25 RX ORDER — NALOXONE HCL 0.4 MG/ML
0.04 VIAL (ML) INJECTION AS NEEDED
Status: DISCONTINUED | OUTPATIENT
Start: 2024-07-25 | End: 2024-07-25 | Stop reason: HOSPADM

## 2024-07-25 RX ORDER — BUPIVACAINE HYDROCHLORIDE 2.5 MG/ML
INJECTION, SOLUTION INFILTRATION; PERINEURAL AS NEEDED
Status: DISCONTINUED | OUTPATIENT
Start: 2024-07-25 | End: 2024-07-25 | Stop reason: HOSPADM

## 2024-07-25 RX ORDER — FENTANYL CITRATE 50 UG/ML
50 INJECTION, SOLUTION INTRAMUSCULAR; INTRAVENOUS
Status: DISCONTINUED | OUTPATIENT
Start: 2024-07-25 | End: 2024-07-25 | Stop reason: HOSPADM

## 2024-07-25 RX ORDER — FAMOTIDINE 10 MG/ML
20 INJECTION, SOLUTION INTRAVENOUS
Status: DISCONTINUED | OUTPATIENT
Start: 2024-07-25 | End: 2024-07-25 | Stop reason: HOSPADM

## 2024-07-25 RX ORDER — FENTANYL CITRATE 50 UG/ML
INJECTION, SOLUTION INTRAMUSCULAR; INTRAVENOUS AS NEEDED
Status: DISCONTINUED | OUTPATIENT
Start: 2024-07-25 | End: 2024-07-25 | Stop reason: SURG

## 2024-07-25 RX ORDER — PROPOFOL 10 MG/ML
VIAL (ML) INTRAVENOUS AS NEEDED
Status: DISCONTINUED | OUTPATIENT
Start: 2024-07-25 | End: 2024-07-25 | Stop reason: SURG

## 2024-07-25 RX ORDER — MIDAZOLAM HYDROCHLORIDE 1 MG/ML
1 INJECTION INTRAMUSCULAR; INTRAVENOUS
Status: DISCONTINUED | OUTPATIENT
Start: 2024-07-25 | End: 2024-07-25 | Stop reason: HOSPADM

## 2024-07-25 RX ORDER — SODIUM CHLORIDE, SODIUM LACTATE, POTASSIUM CHLORIDE, CALCIUM CHLORIDE 600; 310; 30; 20 MG/100ML; MG/100ML; MG/100ML; MG/100ML
1000 INJECTION, SOLUTION INTRAVENOUS CONTINUOUS
Status: DISCONTINUED | OUTPATIENT
Start: 2024-07-25 | End: 2024-07-25 | Stop reason: HOSPADM

## 2024-07-25 RX ORDER — SODIUM CHLORIDE 0.9 % (FLUSH) 0.9 %
1-10 SYRINGE (ML) INJECTION AS NEEDED
Status: DISCONTINUED | OUTPATIENT
Start: 2024-07-25 | End: 2024-07-25 | Stop reason: HOSPADM

## 2024-07-25 RX ORDER — FLUMAZENIL 0.1 MG/ML
0.2 INJECTION INTRAVENOUS AS NEEDED
Status: DISCONTINUED | OUTPATIENT
Start: 2024-07-25 | End: 2024-07-25 | Stop reason: HOSPADM

## 2024-07-25 RX ORDER — SODIUM CHLORIDE 0.9 % (FLUSH) 0.9 %
10 SYRINGE (ML) INJECTION AS NEEDED
Status: DISCONTINUED | OUTPATIENT
Start: 2024-07-25 | End: 2024-07-25 | Stop reason: HOSPADM

## 2024-07-25 RX ORDER — SODIUM CHLORIDE, SODIUM LACTATE, POTASSIUM CHLORIDE, CALCIUM CHLORIDE 600; 310; 30; 20 MG/100ML; MG/100ML; MG/100ML; MG/100ML
9 INJECTION, SOLUTION INTRAVENOUS CONTINUOUS
Status: DISCONTINUED | OUTPATIENT
Start: 2024-07-25 | End: 2024-07-25 | Stop reason: HOSPADM

## 2024-07-25 RX ORDER — ENOXAPARIN SODIUM 100 MG/ML
30 INJECTION SUBCUTANEOUS
Qty: 2.1 ML | Refills: 0 | Status: SHIPPED | OUTPATIENT
Start: 2024-07-25 | End: 2024-08-01

## 2024-07-25 RX ORDER — IBUPROFEN 600 MG/1
600 TABLET ORAL EVERY 6 HOURS PRN
Status: DISCONTINUED | OUTPATIENT
Start: 2024-07-25 | End: 2024-07-25 | Stop reason: HOSPADM

## 2024-07-25 RX ORDER — OXYCODONE AND ACETAMINOPHEN 10; 325 MG/1; MG/1
1 TABLET ORAL EVERY 4 HOURS PRN
Status: DISCONTINUED | OUTPATIENT
Start: 2024-07-25 | End: 2024-07-25 | Stop reason: HOSPADM

## 2024-07-25 RX ORDER — DEXAMETHASONE SODIUM PHOSPHATE 4 MG/ML
INJECTION, SOLUTION INTRA-ARTICULAR; INTRALESIONAL; INTRAMUSCULAR; INTRAVENOUS; SOFT TISSUE AS NEEDED
Status: DISCONTINUED | OUTPATIENT
Start: 2024-07-25 | End: 2024-07-25 | Stop reason: SURG

## 2024-07-25 RX ORDER — FENTANYL CITRATE 50 UG/ML
25 INJECTION, SOLUTION INTRAMUSCULAR; INTRAVENOUS
Status: DISCONTINUED | OUTPATIENT
Start: 2024-07-25 | End: 2024-07-25 | Stop reason: HOSPADM

## 2024-07-25 RX ORDER — SODIUM CHLORIDE 9 MG/ML
40 INJECTION, SOLUTION INTRAVENOUS AS NEEDED
Status: DISCONTINUED | OUTPATIENT
Start: 2024-07-25 | End: 2024-07-25 | Stop reason: HOSPADM

## 2024-07-25 RX ORDER — HYDROMORPHONE HYDROCHLORIDE 1 MG/ML
0.5 INJECTION, SOLUTION INTRAMUSCULAR; INTRAVENOUS; SUBCUTANEOUS
Status: DISCONTINUED | OUTPATIENT
Start: 2024-07-25 | End: 2024-07-25 | Stop reason: HOSPADM

## 2024-07-25 RX ORDER — SCOLOPAMINE TRANSDERMAL SYSTEM 1 MG/1
1 PATCH, EXTENDED RELEASE TRANSDERMAL ONCE
Status: DISCONTINUED | OUTPATIENT
Start: 2024-07-25 | End: 2024-07-25 | Stop reason: HOSPADM

## 2024-07-25 RX ORDER — ONDANSETRON 2 MG/ML
4 INJECTION INTRAMUSCULAR; INTRAVENOUS
Status: DISCONTINUED | OUTPATIENT
Start: 2024-07-25 | End: 2024-07-25 | Stop reason: HOSPADM

## 2024-07-25 RX ORDER — DEXTROSE MONOHYDRATE 25 G/50ML
12.5 INJECTION, SOLUTION INTRAVENOUS AS NEEDED
Status: DISCONTINUED | OUTPATIENT
Start: 2024-07-25 | End: 2024-07-25 | Stop reason: HOSPADM

## 2024-07-25 RX ORDER — MAGNESIUM HYDROXIDE 1200 MG/15ML
LIQUID ORAL AS NEEDED
Status: DISCONTINUED | OUTPATIENT
Start: 2024-07-25 | End: 2024-07-25 | Stop reason: HOSPADM

## 2024-07-25 RX ORDER — LABETALOL HYDROCHLORIDE 5 MG/ML
5 INJECTION, SOLUTION INTRAVENOUS
Status: DISCONTINUED | OUTPATIENT
Start: 2024-07-25 | End: 2024-07-25 | Stop reason: HOSPADM

## 2024-07-25 RX ORDER — OXYCODONE HYDROCHLORIDE AND ACETAMINOPHEN 5; 325 MG/1; MG/1
1 TABLET ORAL ONCE AS NEEDED
Status: DISCONTINUED | OUTPATIENT
Start: 2024-07-25 | End: 2024-07-25 | Stop reason: HOSPADM

## 2024-07-25 RX ORDER — SODIUM CHLORIDE 0.9 % (FLUSH) 0.9 %
10 SYRINGE (ML) INJECTION EVERY 12 HOURS SCHEDULED
Status: DISCONTINUED | OUTPATIENT
Start: 2024-07-25 | End: 2024-07-25 | Stop reason: HOSPADM

## 2024-07-25 RX ORDER — ROCURONIUM BROMIDE 10 MG/ML
INJECTION, SOLUTION INTRAVENOUS AS NEEDED
Status: DISCONTINUED | OUTPATIENT
Start: 2024-07-25 | End: 2024-07-25 | Stop reason: SURG

## 2024-07-25 RX ORDER — LIDOCAINE HYDROCHLORIDE 10 MG/ML
0.5 INJECTION, SOLUTION EPIDURAL; INFILTRATION; INTRACAUDAL; PERINEURAL ONCE AS NEEDED
Status: DISCONTINUED | OUTPATIENT
Start: 2024-07-25 | End: 2024-07-25 | Stop reason: HOSPADM

## 2024-07-25 RX ORDER — ONDANSETRON 2 MG/ML
INJECTION INTRAMUSCULAR; INTRAVENOUS AS NEEDED
Status: DISCONTINUED | OUTPATIENT
Start: 2024-07-25 | End: 2024-07-25 | Stop reason: SURG

## 2024-07-25 RX ORDER — SODIUM CHLORIDE 0.9 % (FLUSH) 0.9 %
3 SYRINGE (ML) INJECTION AS NEEDED
Status: DISCONTINUED | OUTPATIENT
Start: 2024-07-25 | End: 2024-07-25 | Stop reason: HOSPADM

## 2024-07-25 RX ORDER — KETOROLAC TROMETHAMINE 30 MG/ML
INJECTION, SOLUTION INTRAMUSCULAR; INTRAVENOUS AS NEEDED
Status: DISCONTINUED | OUTPATIENT
Start: 2024-07-25 | End: 2024-07-25 | Stop reason: SURG

## 2024-07-25 RX ORDER — LIDOCAINE HYDROCHLORIDE 20 MG/ML
INJECTION, SOLUTION EPIDURAL; INFILTRATION; INTRACAUDAL; PERINEURAL AS NEEDED
Status: DISCONTINUED | OUTPATIENT
Start: 2024-07-25 | End: 2024-07-25 | Stop reason: SURG

## 2024-07-25 RX ORDER — DROPERIDOL 2.5 MG/ML
0.62 INJECTION, SOLUTION INTRAMUSCULAR; INTRAVENOUS ONCE AS NEEDED
Status: COMPLETED | OUTPATIENT
Start: 2024-07-25 | End: 2024-07-25

## 2024-07-25 RX ADMIN — HYDROMORPHONE HYDROCHLORIDE 0.5 MG: 1 INJECTION, SOLUTION INTRAMUSCULAR; INTRAVENOUS; SUBCUTANEOUS at 09:47

## 2024-07-25 RX ADMIN — FAMOTIDINE 20 MG: 10 INJECTION INTRAVENOUS at 06:40

## 2024-07-25 RX ADMIN — LIDOCAINE HYDROCHLORIDE 100 MG: 20 INJECTION, SOLUTION EPIDURAL; INFILTRATION; INTRACAUDAL; PERINEURAL at 07:01

## 2024-07-25 RX ADMIN — OXYCODONE HYDROCHLORIDE AND ACETAMINOPHEN 1 TABLET: 10; 325 TABLET ORAL at 09:25

## 2024-07-25 RX ADMIN — SODIUM CHLORIDE, POTASSIUM CHLORIDE, SODIUM LACTATE AND CALCIUM CHLORIDE 1000 ML: 600; 310; 30; 20 INJECTION, SOLUTION INTRAVENOUS at 06:49

## 2024-07-25 RX ADMIN — DEXAMETHASONE SODIUM PHOSPHATE 4 MG: 4 INJECTION INTRA-ARTICULAR; INTRALESIONAL; INTRAMUSCULAR; INTRAVENOUS; SOFT TISSUE at 06:40

## 2024-07-25 RX ADMIN — ROCURONIUM BROMIDE 50 MG: 10 INJECTION, SOLUTION INTRAVENOUS at 07:01

## 2024-07-25 RX ADMIN — DEXAMETHASONE SODIUM PHOSPHATE 4 MG: 4 INJECTION INTRA-ARTICULAR; INTRALESIONAL; INTRAMUSCULAR; INTRAVENOUS; SOFT TISSUE at 07:01

## 2024-07-25 RX ADMIN — CEFAZOLIN 2000 MG: 2 INJECTION, POWDER, FOR SOLUTION INTRAMUSCULAR; INTRAVENOUS at 07:15

## 2024-07-25 RX ADMIN — SUGAMMADEX 200 MG: 100 INJECTION, SOLUTION INTRAVENOUS at 08:27

## 2024-07-25 RX ADMIN — HYDROMORPHONE HYDROCHLORIDE 0.5 MG: 1 INJECTION, SOLUTION INTRAMUSCULAR; INTRAVENOUS; SUBCUTANEOUS at 09:34

## 2024-07-25 RX ADMIN — MIDAZOLAM HYDROCHLORIDE 1 MG: 1 INJECTION, SOLUTION INTRAMUSCULAR; INTRAVENOUS at 06:40

## 2024-07-25 RX ADMIN — KETOROLAC TROMETHAMINE 30 MG: 30 INJECTION, SOLUTION INTRAMUSCULAR; INTRAVENOUS at 08:26

## 2024-07-25 RX ADMIN — FENTANYL CITRATE 25 MCG: 50 INJECTION, SOLUTION INTRAMUSCULAR; INTRAVENOUS at 07:29

## 2024-07-25 RX ADMIN — FENTANYL CITRATE 50 MCG: 50 INJECTION, SOLUTION INTRAMUSCULAR; INTRAVENOUS at 09:37

## 2024-07-25 RX ADMIN — DROPERIDOL 0.62 MG: 2.5 INJECTION, SOLUTION INTRAMUSCULAR; INTRAVENOUS at 09:32

## 2024-07-25 RX ADMIN — HYDROMORPHONE HYDROCHLORIDE 0.5 MG: 1 INJECTION, SOLUTION INTRAMUSCULAR; INTRAVENOUS; SUBCUTANEOUS at 09:20

## 2024-07-25 RX ADMIN — SCOPALAMINE 1 PATCH: 1 PATCH, EXTENDED RELEASE TRANSDERMAL at 06:40

## 2024-07-25 RX ADMIN — FENTANYL CITRATE 25 MCG: 50 INJECTION, SOLUTION INTRAMUSCULAR; INTRAVENOUS at 07:34

## 2024-07-25 RX ADMIN — FENTANYL CITRATE 100 MCG: 50 INJECTION, SOLUTION INTRAMUSCULAR; INTRAVENOUS at 07:01

## 2024-07-25 RX ADMIN — PROPOFOL 200 MG: 10 INJECTION, EMULSION INTRAVENOUS at 07:01

## 2024-07-25 RX ADMIN — ONDANSETRON 4 MG: 2 INJECTION INTRAMUSCULAR; INTRAVENOUS at 08:19

## 2024-07-25 RX ADMIN — GLYCOPYRROLATE 0.2 MG: 0.2 INJECTION INTRAMUSCULAR; INTRAVENOUS at 07:27

## 2024-07-25 RX ADMIN — FENTANYL CITRATE 50 MCG: 50 INJECTION, SOLUTION INTRAMUSCULAR; INTRAVENOUS at 07:45

## 2024-07-25 NOTE — ANESTHESIA PREPROCEDURE EVALUATION
Anesthesia Evaluation     Patient summary reviewed   no history of anesthetic complications:   NPO Solid Status: > 8 hours             Airway   Mallampati: II  TM distance: >3 FB  Neck ROM: full  Dental      Pulmonary    (+) pulmonary embolism,  (-) COPD, asthma, sleep apnea, not a smoker  Cardiovascular   Exercise tolerance: excellent (>7 METS)    (-) pacemaker, past MI, angina, cardiac stents      Neuro/Psych  (-) seizures, TIA, CVA  GI/Hepatic/Renal/Endo    (+) obesity, diabetes mellitus  (-) GERD, liver disease, no renal disease    Musculoskeletal     Abdominal    Substance History      OB/GYN    (-)  Pregnant        Other                    Anesthesia Plan    ASA 2     general     intravenous induction     Anesthetic plan, risks, benefits, and alternatives have been provided, discussed and informed consent has been obtained with: patient.    CODE STATUS:

## 2024-07-25 NOTE — OP NOTE
[place]  Adina Harris  : 1990  MRN: [id number]  CSN: [id number]  Date: 2024    Operative Note    Pre-op Diagnosis:  Menorrhagia with irregular cycle [N92.1]   Post-op Diagnosis:  Post-Op Diagnosis Codes:     * Menorrhagia with irregular cycle [N92.1]   Procedure: Procedure(s):  TOTAL LAPAROSCOPIC HYSTERECTOMY BILATERAL SALPINGECTOMY WITH DAVINCI ROBOT, CYSTO   Surgeon: Surgeon(s):  Cesilia Caba MD       Anesthesia: General      Estimated Blood Loss: 50   mls   Fluids: 1000   mls   UOP: 145   mls   Drains: Manriquez   ABx: Kefzol     Specimens:  Uterus, tubes, cervix    Findings: Normal size uterus with normal ovaries bilaterally   Complications: None       INDICATIONS: Adina Harris is a 34 y.o. female who has chosen definitive therapy with hysterectomy.      PROCEDURE: After informed consent was obtained, the patient was taken to the operating room where general anesthesia was administered. She was placed in the lithotomy position in [facility] and her abdomen, perineum and vagina were prepped and draped in normal sterile fashion.  Attention was then turned to the vagina. A speculum was placed and the cervix visualized and grasped with a tenaculum. The cervix was dilated and a medium VCare uterine manipulator was placed into the uterine cavity. The balloon was inflated. The speculum and tenaculum were removed.  A Manriquez catheter was inserted. The skin just above the umbilicus was infiltrated with 0.25% MARCAINE solution and then incised with a scalpel approximately 1 cm in length. The subcutaneous tissues were divided bluntly. The abdominal wall was then elevated and the Veress needle placed into the peritoneal cavity without difficulty. Correct placement was confirmed with water drop test and measurement of gas pressures and flow. After insufflating the abdomen with carbon dioxide, the Veress needle was removed and an 8 mm, tissue-, blunt da Jacqui trocar was placed  into the abdominal cavity while tenting the abdominal wall. Correct placement was confirmed by visualization with the laparoscope. Two robotic ports were placed in the lower quadrants, one on each side. The skin was infiltrated with MARCAINE, incised with the scalpel and then the port placed under direct visualization with the laparoscope.  The robotic cart was advanced and docked without difficulty. A vessel sealer was placed in the right arm and bipolar fenestrated forceps were placed in the left arm. I then moved to the console to perform the hysterectomy.   The pelvis was inspected with the findings noted above. Each fallopian tube was grasped and elevated and the mesosalpinx cauterized and divided to the uterine cornua.  The tubes were excised and removed. The uteroovarian ligaments were cauterized  then transected with the vessel sealer.  The round ligaments were cauterized and transected  as well. The anterior and posterior leaves of the broad ligaments were then easily dissected separately to skeletonize the uterine vessels. There was a nice plane visible with the bladder flap, which was undermined with the vessel sealer.  The bladder was then mobilized off the anterior aspect of the cervix, well below the cervical cap, which was easily visible. The uterine vessels on each side were then cauterized with bipolar energy. They were then transected with the vessel sealer.  Several more small bites with the bipolar forceps ensured good hemostasis and mobilization off of the cervix. The uterosacral ligament insertions to the posterior aspect of the uterus were cauterized with bipolar energy and then transected with the monopolar scissors. The uterus was dusky in appearance and colpotomy was then performed initially anteriorly. The monopolar scissors were used to incise the vagina until vaginal entry was obtained at the cervical cap. This was then followed around bilaterally using monopolar energy. The uterus was  then rotated anteriorly and the posterior incision was finished with the monopolar scissors. Once the cervix was completely excised from the upper vagina, the cervix and uterus were easily removed through the colpotomy incision. The monopolar scissors were then changed out for a needle  and a suture of 2-0 Stratafix was used to close the cuff. The suture was placed starting at the right corner, taking a full thickness bite anteriorly and posteriorly. We proceeded in a running fashion to the left corner, which was brought up well and secured during the process.  A second layer was then placed from left to right.  The needle was then reversed to secure the suture in place prior to removing the needle. The vaginal cuff was then irrigated and inspected with good hemostasis noted. The procedure was then terminated, the instruments were removed and the robotic cart undocked and moved away from the patient. The gas flow was discontinued and all gas allowed to escape through the umbilical port. The trocars were removed. The umbilical incision fascia was closed with 0 vicryl. The skin incisions were closed with subcuticular 3-0 Vicryl Rapide and reinforced with Steri-Strips.  The vagina was then inspected with the speculum and the cuff was noted to be hemostatic and intact. The Manriquez was removed and the cystoscope placed in the bladder. About 200 mL normal saline was used to distend the bladder. The bladder was inspected and noted to be intact and without obvious lesions. The ureters were identified bilaterally and were observed to be effluxing clear urine. The cystoscope was removed. The patient was then awakened and taken to the recovery room in stable condition. She tolerated the procedure well without complications. All sponge, needle and instrument counts were correct times 3 per the operating room staff.    Cesilia Caba MD   7/25/2024  08:36 CDT

## 2024-07-25 NOTE — ANESTHESIA PROCEDURE NOTES
Airway  Urgency: elective    Date/Time: 7/25/2024 7:04 AM  Airway not difficult    General Information and Staff    Patient location during procedure: OR  CRNA/CAA: Roseann Rolle CRNA    Indications and Patient Condition  Indications for airway management: airway protection    Preoxygenated: yes  Mask difficulty assessment: 1 - vent by mask    Final Airway Details  Final airway type: endotracheal airway      Successful airway: ETT  Cuffed: yes   Successful intubation technique: direct laryngoscopy  Facilitating devices/methods: intubating stylet  Endotracheal tube insertion site: oral  Blade: Ng  Blade size: 4  ETT size (mm): 7.0  Cormack-Lehane Classification: grade I - full view of glottis  Placement verified by: chest auscultation and capnometry   Cuff volume (mL): 8  Measured from: lips  ETT/EBT  to lips (cm): 22  Number of attempts at approach: 1  Assessment: lips, teeth, and gum same as pre-op and atraumatic intubation    Additional Comments  Shoshana Zarco, SRNA

## 2024-07-25 NOTE — ANESTHESIA POSTPROCEDURE EVALUATION
Patient: Adina Harris    Procedure Summary       Date: 07/25/24 Room / Location: [facility] OR  /  [facility] OR    Anesthesia Start: 0659 Anesthesia Stop: 0855    Procedure: TOTAL LAPAROSCOPIC HYSTERECTOMY BILATERAL SALPINGECTOMY WITH DAVINCI ROBOT, CYSTO (Abdomen) Diagnosis:       Menorrhagia with irregular cycle      (Menorrhagia with irregular cycle [N92.1])    Surgeons: Cesilia Caba MD Provider: Roseann Rolle CRNA    Anesthesia Type: general ASA Status: 2            Anesthesia Type: general    Vitals  Vitals Value Taken Time   /72 07/25/24 1027   Temp 98.2 °F (36.8 °C) 07/25/24 1025   Pulse 82 07/25/24 1028   Resp 20 07/25/24 1025   SpO2 96 % 07/25/24 1028   Vitals shown include unfiled device data.        Post Anesthesia Care and Evaluation    Patient location during evaluation: PHASE II  Patient participation: complete - patient participated  Level of consciousness: awake and awake and alert  Pain score: 0  Pain management: adequate    Airway patency: patent  Anesthetic complications: No anesthetic complications  PONV Status: none  Cardiovascular status: acceptable  Respiratory status: acceptable  Hydration status: acceptable    Comments: Patient discharged according to acceptable Kayy score per RN assessment. See nursing records for further information.     Blood pressure 129/71, pulse 100, temperature 98.2 °F (36.8 °C), temperature source Temporal, resp. rate 18, SpO2 97%, not currently breastfeeding.

## 2024-07-27 LAB
CYTO UR: NORMAL
LAB AP CASE REPORT: NORMAL
Lab: NORMAL
PATH REPORT.FINAL DX SPEC: NORMAL
PATH REPORT.GROSS SPEC: NORMAL

## 2024-08-06 ENCOUNTER — OFFICE VISIT (OUTPATIENT)
Age: 34
End: 2024-08-06
Payer: COMMERCIAL

## 2024-08-06 VITALS
BODY MASS INDEX: 34.74 KG/M2 | SYSTOLIC BLOOD PRESSURE: 110 MMHG | WEIGHT: 229.2 LBS | HEIGHT: 68 IN | DIASTOLIC BLOOD PRESSURE: 74 MMHG

## 2024-08-06 DIAGNOSIS — Z09 FOLLOW-UP EXAMINATION, FOLLOWING OTHER SURGERY: Primary | ICD-10-CM

## 2024-08-06 PROCEDURE — 99024 POSTOP FOLLOW-UP VISIT: CPT | Performed by: OBSTETRICS & GYNECOLOGY

## 2024-08-06 NOTE — PROGRESS NOTES
"Chief Complaint  Post-op (Pt here for 2 wk postop, [facility] BS 7-. Pt denies any problems today.)    Subjective        Adina Harris presents to [facility]  for post op visit after TLH/bilateral salpingectomy. Pt doing well. Is not having any pain. Reports normal bowel and bladder function. Denies VB, vaginal discharge.  Pt took her lovenox.   History of Present Illness    Objective   Vital Signs:  /74   Ht 172 cm (67.72\")   Wt 104 kg (229 lb 3.2 oz)   BMI 35.14 kg/m²   Estimated body mass index is 35.14 kg/m² as calculated from the following:    Height as of this encounter: 172 cm (67.72\").    Weight as of this encounter: 104 kg (229 lb 3.2 oz).             Physical Exam   Abdomen: soft/Nt/Nd normal BS, incision well healing without s/s infection   Result Review :                     Assessment and Plan     Diagnoses and all orders for this visit:    1. Follow-up examination, following other surgery (Primary)  Post op TLH/bilateral salpingectomy. Pt doing well. RTC 4 weeks. Increase activities as tolerated except intercourse, tub bathes and swimming.            Follow Up     Return in about 4 weeks (around 9/3/2024).  Patient was given instructions and counseling regarding her condition or for health maintenance advice. Please see specific information pulled into the AVS if appropriate.         "

## 2024-08-07 DIAGNOSIS — L03.316 CELLULITIS OF UMBILICUS: Primary | ICD-10-CM

## 2024-08-07 RX ORDER — MUPIROCIN CALCIUM 20 MG/G
CREAM TOPICAL
Qty: 30 G | Refills: 0 | Status: SHIPPED | OUTPATIENT
Start: 2024-08-07 | End: 2024-08-14

## 2024-09-03 ENCOUNTER — OFFICE VISIT (OUTPATIENT)
Age: 34
End: 2024-09-03
Payer: COMMERCIAL

## 2024-09-03 VITALS
BODY MASS INDEX: 35.34 KG/M2 | HEIGHT: 68 IN | WEIGHT: 233.2 LBS | SYSTOLIC BLOOD PRESSURE: 122 MMHG | DIASTOLIC BLOOD PRESSURE: 78 MMHG

## 2024-09-03 DIAGNOSIS — Z09 FOLLOW-UP EXAMINATION, FOLLOWING OTHER SURGERY: Primary | ICD-10-CM

## 2024-09-03 PROCEDURE — 99024 POSTOP FOLLOW-UP VISIT: CPT | Performed by: OBSTETRICS & GYNECOLOGY

## 2024-09-03 NOTE — PROGRESS NOTES
"Chief Complaint  Post-op (Pt here for 6 wk postop TLH BS with yvette 7-25-24, denies any problems today.)    Subjective        Adina Harris presents to [facility] for final post op visit. Pt doing well. No complaints. Denies VB, pain, vaginal discharge. Pt back to regular activities.   History of Present Illness    Objective   Vital Signs:  /78   Ht 172 cm (67.72\")   Wt 106 kg (233 lb 3.2 oz)   BMI 35.75 kg/m²   Estimated body mass index is 35.75 kg/m² as calculated from the following:    Height as of this encounter: 172 cm (67.72\").    Weight as of this encounter: 106 kg (233 lb 3.2 oz).          Physical Exam   Normal external genitalia, vaginal cuff well suspended, no granulation tissue or suture present,well healed  Result Review :                Assessment and Plan   Diagnoses and all orders for this visit:    1. Follow-up examination, following other surgery (Primary)  Post op TLH/Bilateral salpingectomy, doing well. Resume all normal activities. RTC yearly.            Follow Up   No follow-ups on file.  Patient was given instructions and counseling regarding her condition or for health maintenance advice. Please see specific information pulled into the AVS if appropriate.             "

## 2024-09-17 ENCOUNTER — OFFICE VISIT (OUTPATIENT)
Dept: INTERNAL MEDICINE | Facility: CLINIC | Age: 34
End: 2024-09-17
Payer: COMMERCIAL

## 2024-09-17 VITALS
TEMPERATURE: 97.6 F | OXYGEN SATURATION: 100 % | HEART RATE: 77 BPM | BODY MASS INDEX: 35.46 KG/M2 | SYSTOLIC BLOOD PRESSURE: 119 MMHG | DIASTOLIC BLOOD PRESSURE: 72 MMHG | WEIGHT: 234 LBS | HEIGHT: 68 IN | RESPIRATION RATE: 19 BRPM

## 2024-09-17 DIAGNOSIS — E66.9 OBESITY, CLASS II, BMI 35-39.9: Primary | ICD-10-CM

## 2024-09-17 DIAGNOSIS — R73.03 PRE-DIABETES: ICD-10-CM

## 2024-09-17 LAB
EXPIRATION DATE: NORMAL
HBA1C MFR BLD: 5.5 % (ref 4.5–5.7)
Lab: NORMAL

## 2024-09-17 PROCEDURE — 99213 OFFICE O/P EST LOW 20 MIN: CPT | Performed by: NURSE PRACTITIONER

## 2024-09-17 PROCEDURE — 83036 HEMOGLOBIN GLYCOSYLATED A1C: CPT | Performed by: NURSE PRACTITIONER

## 2024-10-03 ENCOUNTER — OFFICE VISIT (OUTPATIENT)
Dept: INTERNAL MEDICINE | Facility: CLINIC | Age: 34
End: 2024-10-03
Payer: COMMERCIAL

## 2024-10-03 VITALS
DIASTOLIC BLOOD PRESSURE: 76 MMHG | SYSTOLIC BLOOD PRESSURE: 108 MMHG | RESPIRATION RATE: 19 BRPM | OXYGEN SATURATION: 96 % | WEIGHT: 228 LBS | BODY MASS INDEX: 34.56 KG/M2 | TEMPERATURE: 98.6 F | HEART RATE: 89 BPM | HEIGHT: 68 IN

## 2024-10-03 DIAGNOSIS — R68.89 CONGESTION OF THROAT: ICD-10-CM

## 2024-10-03 DIAGNOSIS — J02.0 STREP PHARYNGITIS: ICD-10-CM

## 2024-10-03 DIAGNOSIS — J02.9 ACUTE SORE THROAT: Primary | ICD-10-CM

## 2024-10-03 LAB
EXPIRATION DATE: ABNORMAL
EXPIRATION DATE: NORMAL
FLUAV AG UPPER RESP QL IA.RAPID: NOT DETECTED
FLUBV AG UPPER RESP QL IA.RAPID: NOT DETECTED
INTERNAL CONTROL: ABNORMAL
INTERNAL CONTROL: NORMAL
Lab: ABNORMAL
Lab: NORMAL
S PYO AG THROAT QL: POSITIVE
SARS-COV-2 AG UPPER RESP QL IA.RAPID: NOT DETECTED

## 2024-10-03 PROCEDURE — 87428 SARSCOV & INF VIR A&B AG IA: CPT | Performed by: NURSE PRACTITIONER

## 2024-10-03 PROCEDURE — 87880 STREP A ASSAY W/OPTIC: CPT | Performed by: NURSE PRACTITIONER

## 2024-10-03 PROCEDURE — 96372 THER/PROPH/DIAG INJ SC/IM: CPT | Performed by: NURSE PRACTITIONER

## 2024-10-03 PROCEDURE — 99213 OFFICE O/P EST LOW 20 MIN: CPT | Performed by: NURSE PRACTITIONER

## 2024-10-03 RX ORDER — AZITHROMYCIN 250 MG/1
TABLET, FILM COATED ORAL
Qty: 6 TABLET | Refills: 0 | Status: SHIPPED | OUTPATIENT
Start: 2024-10-03

## 2024-10-03 RX ORDER — METHYLPREDNISOLONE 4 MG
TABLET, DOSE PACK ORAL
Qty: 21 TABLET | Refills: 0 | Status: SHIPPED | OUTPATIENT
Start: 2024-10-03

## 2024-10-03 NOTE — PROGRESS NOTES
Subjective     Chief Complaint   Patient presents with    Cough    Nasal Congestion    Sore Throat       History of Present Illness  The patient is a 34-year-old female who presents for evaluation of strep throat.    She has experienced some weight loss recently. Her illness began on Tuesday night, approximately 48 hours ago. She reports severe throat pain, which intensifies when swallowing or turning her neck. She also mentions occasional coughing.    Otherwise complete ROS reviewed and negative except as mentioned in the HPI.    Past Medical History:   Past Medical History:   Diagnosis Date    Anemia     Chronic kidney disease     stones    Deep vein thrombosis 2022    Depression 2022    Diabetes mellitus     GERD (gastroesophageal reflux disease)     Kidney stone     ESWL in     Migraine     Stress related    Ovarian cyst     Pulmonary embolism 2022     Past Surgical History:  Past Surgical History:   Procedure Laterality Date     SECTION N/A 10/02/2017    Procedure:  SECTION PRIMARY;  Surgeon: Cecily Riggs MD;  Location: [facility] LABOR DELIVERY;  Service:      SECTION N/A 2020    Procedure: REPEAT  SECTION WITHOUT TUBAL LIGATION;  Surgeon: Cecily Riggs MD;  Location: [facility] LABOR DELIVERY;  Service: Obstetrics/Gynecology;  Laterality: N/A;    CLEFT LIP REPAIR      CLEFT PALATE REPAIR      8 surgeries    COSMETIC SURGERY      Cleft lip repair 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 She has never been exposed to tobacco smoke. She has never used smokeless tobacco. She reports that she does not drink alcohol and does not use drugs.    Family History: family history includes Alcohol abuse in her father; Birth defects in her father; Breast cancer in her paternal grandmother; Cancer in her maternal grandfather and paternal grandmother; Depression in her mother; Hypertension in her father; Melanoma in her paternal grandmother; Prostate cancer in her paternal grandfather; Thyroid disease in her mother.       Allergies:  Allergies   Allergen Reactions    Amoxicillin      reaction as a baby, pt told by mother not to take amoxicillin.    Imitrex [Sumatriptan] Nausea And Vomiting and Nausea Only    Penicillins Other (See Comments)     Makes her crazy - mean.      Medications:  Prior to Admission medications    Medication Sig Start Date End Date Taking? Authorizing Provider   metFORMIN ER (GLUCOPHAGE-XR) 500 MG 24 hr tablet Take 1 tablet by mouth Daily With Breakfast.  Patient taking differently: Take 1 tablet by mouth Daily With Breakfast. FOR WEIGHT LOSS AND STATES SHE IS PRE DIABETIC 3/18/24   Cesilia Benítez APRN   Semaglutide-Weight Management 0.25 MG/0.5ML solution auto-injector Inject 0.5 mL under the skin into the appropriate area as directed 1 (One) Time Per Week. 9/17/24   Cesilia Benítez APRN   sertraline (ZOLOFT) 100 MG tablet Take 1 tablet by mouth Daily. 5/21/24   Cesilia Caba MD   topiramate (TOPAMAX) 15 MG capsule (sprinkle) Take 1 capsule by mouth 2 (Two) Times a Day.  Patient taking differently: Take 1 capsule by mouth 2 (Two) Times a Day. WITH METFORMIN FOR WEIGHT LOSS 3/18/24   Cesilia Benítez APRN       Objective     Vital Signs: /76   Pulse 89   Temp 98.6 °F (37 °C)   Resp 19   Ht 172 cm (67.72\")   Wt 103 kg (228 lb)   LMP 06/15/2024 (Exact Date)   SpO2 96%   BMI 34.95 kg/m²     Physical Exam    Physical " Exam  Vitals reviewed.   Constitutional:       Appearance: She is well-developed. She is obese.   HENT:      Head: Normocephalic and atraumatic.      Right Ear: Tympanic membrane normal.      Left Ear: Tympanic membrane normal.      Mouth/Throat:      Pharynx: Oropharyngeal exudate and posterior oropharyngeal erythema present.   Eyes:      Pupils: Pupils are equal, round, and reactive to light.   Neck:      Vascular: No JVD.   Cardiovascular:      Rate and Rhythm: Normal rate and regular rhythm.   Pulmonary:      Effort: Pulmonary effort is normal.      Breath sounds: Normal breath sounds.   Abdominal:      General: Bowel sounds are normal.      Palpations: Abdomen is soft.   Musculoskeletal:         General: No deformity.      Cervical back: Normal range of motion and neck supple.   Lymphadenopathy:      Cervical: No cervical adenopathy.   Skin:     General: Skin is warm and dry.   Neurological:      Mental Status: She is alert and oriented to person, place, and time.   Psychiatric:         Behavior: Behavior normal.         Thought Content: Thought content normal.         Judgment: Judgment normal.       Results Reviewed:  Glucose   Date Value Ref Range Status   07/10/2024 87 65 - 99 mg/dL Final     BUN   Date Value Ref Range Status   07/10/2024 12 6 - 20 mg/dL Final     Creatinine   Date Value Ref Range Status   07/10/2024 0.81 0.57 - 1.00 mg/dL Final     Sodium   Date Value Ref Range Status   07/10/2024 137 136 - 145 mmol/L Final     Potassium   Date Value Ref Range Status   07/10/2024 4.2 3.5 - 5.2 mmol/L Final     Chloride   Date Value Ref Range Status   07/10/2024 105 98 - 107 mmol/L Final     CO2   Date Value Ref Range Status   07/10/2024 25.0 22.0 - 29.0 mmol/L Final     Calcium   Date Value Ref Range Status   07/10/2024 9.1 8.6 - 10.5 mg/dL Final     ALT (SGPT)   Date Value Ref Range Status   12/17/2023 31 0 - 32 IU/L Final   12/13/2022 31 1 - 33 U/L Final     AST (SGOT)   Date Value Ref Range Status    12/17/2023 33 0 - 40 IU/L Final   12/13/2022 37 (H) 1 - 32 U/L Final     WBC   Date Value Ref Range Status   07/10/2024 4.74 3.40 - 10.80 10*3/mm3 Final   05/21/2024 6.4 3.4 - 10.8 x10E3/uL Final   11/22/2022 5.2 4.8 - 10.8 K/uL Final     Hematocrit   Date Value Ref Range Status   07/10/2024 35.7 34.0 - 46.6 % Final   11/22/2022 45.7 37.0 - 47.0 % Final     Platelets   Date Value Ref Range Status   07/10/2024 216 140 - 450 10*3/mm3 Final   11/22/2022 263 130 - 400 K/uL Final     Total Cholesterol   Date Value Ref Range Status   12/12/2022 214 (H) 0 - 200 mg/dL Final     Triglycerides   Date Value Ref Range Status   12/17/2023 130 0 - 149 mg/dL Final   12/12/2022 135 0 - 150 mg/dL Final     HDL Cholesterol   Date Value Ref Range Status   12/17/2023 44 >39 mg/dL Final   12/12/2022 47 40 - 60 mg/dL Final     LDL Chol Calc (NIH)   Date Value Ref Range Status   12/17/2023 102 (H) 0 - 99 mg/dL Final     LDL/HDL Ratio   Date Value Ref Range Status   12/12/2022 2.98  Final     Hemoglobin A1C   Date Value Ref Range Status   09/17/2024 5.5 4.5 - 5.7 % Final   12/12/2022 5.10 4.80 - 5.60 % Final       Results  Laboratory Studies  Strep test was positive.      Assessment / Plan     Assessment/Plan:  Diagnoses and all orders for this visit:    1. Acute sore throat (Primary)  -     POCT rapid strep A    2. Congestion of throat  -     POCT SARS-CoV-2 Antigen MATHIEU + Flu    3. Strep pharyngitis  -     cefTRIAXone (ROCEPHIN) 350 mg/ml in lidocaine 1% IM syringe (1 gm vial)  -     azithromycin (Zithromax Z-Riki) 250 MG tablet; Take 2 tablets the first day, then 1 tablet daily for 4 days.  Dispense: 6 tablet; Refill: 0  -     methylPREDNISolone (MEDROL) 4 MG dose pack; Take as directed on package instructions.  Dispense: 21 tablet; Refill: 0    Strep is positive. COVID/FLU are negative.     Assessment & Plan  1. Strep Throat.  She presents with severe throat pain, difficulty swallowing, and neck pain, which started 48 hours ago.  Examination revealed significantly swollen tonsils with a positive strep test. A Rocephin injection will be administered today. She will start azithromycin (Z-Riki) tomorrow as the Rocephin will cover her for 24 hours. Steroids will also be prescribed to reduce throat swelling. If there is no significant improvement within 48 hours, she should contact the clinic.    2. Suspected Mononucleosis.  Given the severity of her symptoms and the appearance of her tonsils, mononucleosis is considered a differential diagnosis. However, the clinical presentation is more consistent with strep throat. If symptoms persist or worsen, further testing for mononucleosis may be warranted.      No follow-ups on file. unless patient needs to be seen sooner or acute issues arise.    Code Status:   Patient or patient representative verbalized consent for the use of Ambient Listening during the visit with  YULIANA Wills for chart documentation. 10/3/2024  10:42 CDT  I have discussed the patient results/orders and and plan/recommendation with them at today's visit.      Signed by:    YULIANA Wills Date: 10/03/24

## 2024-11-01 DIAGNOSIS — E66.812 OBESITY, CLASS II, BMI 35-39.9: ICD-10-CM

## 2024-11-01 DIAGNOSIS — R73.03 PRE-DIABETES: ICD-10-CM

## 2024-11-04 RX ORDER — SEMAGLUTIDE 0.5 MG/.5ML
0.5 INJECTION, SOLUTION SUBCUTANEOUS WEEKLY
Qty: 2 ML | Refills: 0 | Status: SHIPPED | OUTPATIENT
Start: 2024-11-04

## 2024-11-04 NOTE — TELEPHONE ENCOUNTER
Rx Refill Note  Requested Prescriptions     Pending Prescriptions Disp Refills    Semaglutide-Weight Management 0.25 MG/0.5ML solution auto-injector 2 mL 0     Sig: Inject 0.5 mL under the skin into the appropriate area as directed 1 (One) Time Per Week.      Last office visit with prescribing clinician: 10/3/2024   Last telemedicine visit with prescribing clinician: Visit date not found   Next office visit with prescribing clinician: 12/17/2024                         Would you like a call back once the refill request has been completed: [] Yes [] No    If the office needs to give you a call back, can they leave a voicemail: [] Yes [] No    Janene Acosta RN  11/04/24, 07:27 CST

## 2024-12-13 RX ORDER — SEMAGLUTIDE 1 MG/.5ML
1 INJECTION, SOLUTION SUBCUTANEOUS WEEKLY
Qty: 2 ML | Refills: 1 | Status: SHIPPED | OUTPATIENT
Start: 2024-12-13

## 2024-12-13 RX ORDER — SEMAGLUTIDE 0.5 MG/.5ML
0.5 INJECTION, SOLUTION SUBCUTANEOUS WEEKLY
Qty: 2 ML | Refills: 0 | OUTPATIENT
Start: 2024-12-13

## 2024-12-13 NOTE — TELEPHONE ENCOUNTER
Rx Refill Note  Requested Prescriptions     Pending Prescriptions Disp Refills    Semaglutide-Weight Management (Wegovy) 0.5 MG/0.5ML solution auto-injector 2 mL 0     Sig: Inject 0.5 mL under the skin into the appropriate area as directed 1 (One) Time Per Week.      Last office visit with prescribing clinician: 10/3/2024   Last telemedicine visit with prescribing clinician: Visit date not found   Next office visit with prescribing clinician: 12/17/2024                         Would you like a call back once the refill request has been completed: [] Yes [] No    If the office needs to give you a call back, can they leave a voicemail: [] Yes [] No    Janene Acosta RN  12/13/24, 07:02 CST

## 2024-12-17 ENCOUNTER — OFFICE VISIT (OUTPATIENT)
Dept: INTERNAL MEDICINE | Facility: CLINIC | Age: 34
End: 2024-12-17
Payer: COMMERCIAL

## 2024-12-17 VITALS
WEIGHT: 215 LBS | OXYGEN SATURATION: 99 % | HEART RATE: 64 BPM | HEIGHT: 68 IN | SYSTOLIC BLOOD PRESSURE: 105 MMHG | TEMPERATURE: 97.8 F | BODY MASS INDEX: 32.58 KG/M2 | DIASTOLIC BLOOD PRESSURE: 72 MMHG

## 2024-12-17 DIAGNOSIS — E66.811 OBESITY, CLASS I, BMI 30-34.9: Primary | ICD-10-CM

## 2024-12-17 PROCEDURE — 99213 OFFICE O/P EST LOW 20 MIN: CPT | Performed by: NURSE PRACTITIONER

## 2024-12-17 NOTE — PROGRESS NOTES
Subjective     Chief Complaint   Patient presents with    Weight Loss     States medication is working well       History of Present Illness  The patient is a 34-year-old female who presents for weight management.    She reports a positive response to the current regimen of Wegovy 0.5 mg, with no significant side effects noted. She has experienced a weight loss of 20 pounds since her last visit in 2024. Her bowel movements are regular and normal.    MEDICATIONS  Current: Wegovy, Zofran      Patient's PMR from outside medical facility reviewed and noted.      Otherwise complete ROS reviewed and negative except as mentioned in the HPI.    Past Medical History:   Past Medical History:   Diagnosis Date    Anemia     Chronic kidney disease     stones    Deep vein thrombosis 2022    Depression 2022    Diabetes mellitus     GERD (gastroesophageal reflux disease)     Kidney stone     ESWL in     Migraine     Stress related    Ovarian cyst     Pulmonary embolism 2022     Past Surgical History:  Past Surgical History:   Procedure Laterality Date     SECTION N/A 10/02/2017    Procedure:  SECTION PRIMARY;  Surgeon: Cecily Riggs MD;  Location: [facility] LABOR DELIVERY;  Service:      SECTION N/A 2020    Procedure: REPEAT  SECTION WITHOUT TUBAL LIGATION;  Surgeon: Cecily Riggs MD;  Location: [facility] LABOR DELIVERY;  Service: Obstetrics/Gynecology;  Laterality: N/A;    CLEFT LIP REPAIR      CLEFT PALATE REPAIR      8 surgeries    COSMETIC SURGERY      Cleft lip repair [id number]    HAND OPEN REDUCTION INTERNAL FIXATION      KNEE ARTHROSCOPY MENISCUS TRANSPLANT Right     KNEE ARTHROSCOPY W/ MENISCAL REPAIR Left     KNEE SURGERY      piece of femur broke off into knee    TOTAL LAPAROSCOPIC HYSTERECTOMY SALPINGECTOMY N/A 2024    Procedure: TOTAL LAPAROSCOPIC HYSTERECTOMY BILATERAL SALPINGECTOMY WITH DAVINCI ROBOT, CYSTO;  Surgeon: Gertrudis  Cesilia COPELAND MD;  Location: [facility] OR;  Service: Robotics - DaVinci;  Laterality: N/A;     Social History:  reports that she has never smoked. She has never been exposed to tobacco smoke. She has never used smokeless tobacco. She reports that she does not drink alcohol and does not use drugs.    Family History: family history includes Alcohol abuse in her father; Birth defects in her father; Breast cancer in her paternal grandmother; Cancer in her maternal grandfather and paternal grandmother; Depression in her mother; Hypertension in her father; Melanoma in her paternal grandmother; Prostate cancer in her paternal grandfather; Thyroid disease in her mother.       Allergies:  Allergies   Allergen Reactions    Amoxicillin      reaction as a baby, pt told by mother not to take amoxicillin.    Imitrex [Sumatriptan] Nausea And Vomiting and Nausea Only    Penicillins Other (See Comments)     Makes her crazy - mean.      Medications:  Prior to Admission medications    Medication Sig Start Date End Date Taking? Authorizing Provider   Semaglutide-Weight Management (Wegovy) 0.5 MG/0.5ML solution auto-injector Inject 0.5 mL under the skin into the appropriate area as directed 1 (One) Time Per Week. 11/4/24  Yes Cseilia Benítez APRN   sertraline (ZOLOFT) 100 MG tablet Take 1 tablet by mouth Daily. 5/21/24  Yes Cesilia Caba MD   azithromycin (Zithromax Z-Riki) 250 MG tablet Take 2 tablets the first day, then 1 tablet daily for 4 days.  Patient not taking: Reported on 12/17/2024 10/3/24   Cesilia Benítez APRN   metFORMIN ER (GLUCOPHAGE-XR) 500 MG 24 hr tablet Take 1 tablet by mouth Daily With Breakfast.  Patient not taking: Reported on 12/17/2024 3/18/24   Cesilia Benítez APRN   methylPREDNISolone (MEDROL) 4 MG dose pack Take as directed on package instructions.  Patient not taking: Reported on 12/17/2024 10/3/24   Cesilia Benítez APRN   Semaglutide-Weight Management (Wegovy) 1 MG/0.5ML  "solution auto-injector Inject 0.5 mL under the skin into the appropriate area as directed 1 (One) Time Per Week.  Patient not taking: Reported on 12/17/2024 12/13/24   Cesilia Benítez APRN   Semaglutide-Weight Management 0.25 MG/0.5ML solution auto-injector Inject 0.5 mL under the skin into the appropriate area as directed 1 (One) Time Per Week.  Patient not taking: Reported on 12/17/2024 9/17/24   Cesilia Benítez APRN   topiramate (TOPAMAX) 15 MG capsule (sprinkle) Take 1 capsule by mouth 2 (Two) Times a Day.  Patient not taking: Reported on 12/17/2024 3/18/24   Cesilia Benítez APRN       Objective     Vital Signs: /72   Pulse 64   Temp 97.8 °F (36.6 °C)   Ht 172 cm (67.72\")   Wt 97.5 kg (215 lb)   LMP 06/15/2024 (Exact Date)   SpO2 99%   BMI 32.96 kg/m²     Physical Exam  Vital Signs  Patient's weight is 215. BMI is 33.    Physical Exam  Vitals reviewed.   Constitutional:       Appearance: She is well-developed. She is obese.   HENT:      Head: Normocephalic and atraumatic.   Eyes:      Pupils: Pupils are equal, round, and reactive to light.   Neck:      Vascular: No JVD.   Cardiovascular:      Rate and Rhythm: Normal rate and regular rhythm.   Pulmonary:      Effort: Pulmonary effort is normal.      Breath sounds: Normal breath sounds.   Abdominal:      General: Bowel sounds are normal.      Palpations: Abdomen is soft.   Musculoskeletal:         General: No deformity.      Cervical back: Normal range of motion and neck supple.   Lymphadenopathy:      Cervical: No cervical adenopathy.   Skin:     General: Skin is warm and dry.   Neurological:      Mental Status: She is alert and oriented to person, place, and time.   Psychiatric:         Behavior: Behavior normal.         Thought Content: Thought content normal.         Judgment: Judgment normal.       Results Reviewed:  Glucose   Date Value Ref Range Status   07/10/2024 87 65 - 99 mg/dL Final     BUN   Date Value Ref Range " Status   07/10/2024 12 6 - 20 mg/dL Final     Creatinine   Date Value Ref Range Status   07/10/2024 0.81 0.57 - 1.00 mg/dL Final     Sodium   Date Value Ref Range Status   07/10/2024 137 136 - 145 mmol/L Final     Potassium   Date Value Ref Range Status   07/10/2024 4.2 3.5 - 5.2 mmol/L Final     Chloride   Date Value Ref Range Status   07/10/2024 105 98 - 107 mmol/L Final     CO2   Date Value Ref Range Status   07/10/2024 25.0 22.0 - 29.0 mmol/L Final     Calcium   Date Value Ref Range Status   07/10/2024 9.1 8.6 - 10.5 mg/dL Final     ALT (SGPT)   Date Value Ref Range Status   12/17/2023 31 0 - 32 IU/L Final   12/13/2022 31 1 - 33 U/L Final     AST (SGOT)   Date Value Ref Range Status   12/17/2023 33 0 - 40 IU/L Final   12/13/2022 37 (H) 1 - 32 U/L Final     WBC   Date Value Ref Range Status   07/10/2024 4.74 3.40 - 10.80 10*3/mm3 Final   05/21/2024 6.4 3.4 - 10.8 x10E3/uL Final   11/22/2022 5.2 4.8 - 10.8 K/uL Final     Hematocrit   Date Value Ref Range Status   07/10/2024 35.7 34.0 - 46.6 % Final   11/22/2022 45.7 37.0 - 47.0 % Final     Platelets   Date Value Ref Range Status   07/10/2024 216 140 - 450 10*3/mm3 Final   11/22/2022 263 130 - 400 K/uL Final     Total Cholesterol   Date Value Ref Range Status   12/12/2022 214 (H) 0 - 200 mg/dL Final     Triglycerides   Date Value Ref Range Status   12/17/2023 130 0 - 149 mg/dL Final   12/12/2022 135 0 - 150 mg/dL Final     HDL Cholesterol   Date Value Ref Range Status   12/17/2023 44 >39 mg/dL Final   12/12/2022 47 40 - 60 mg/dL Final     LDL Chol Calc (NIH)   Date Value Ref Range Status   12/17/2023 102 (H) 0 - 99 mg/dL Final     LDL/HDL Ratio   Date Value Ref Range Status   12/12/2022 2.98  Final     Hemoglobin A1C   Date Value Ref Range Status   09/17/2024 5.5 4.5 - 5.7 % Final   12/12/2022 5.10 4.80 - 5.60 % Final       Results        Assessment / Plan     Assessment/Plan:  Diagnoses and all orders for this visit:    1. Obesity, Class I, BMI 30-34.9  (Primary)      Patient has been erroneously marked as diabetic. Based on the available clinical information, she does not have diabetes and should therefore be excluded from diabetic health maintenance and quality measures for the remainder of the reporting period.   Assessment & Plan  1. Weight management.  She has demonstrated a commendable weight loss of 20 pounds since her last visit in September 2024, with her BMI decreasing from 35.9 to 33. She is currently on Wegovy 0.5 mg and is tolerating it well without significant side effects. She is advised to continue with the Wegovy 0.5 mg regimen as long as her insurance continues to cover it. She is informed about potential gastrointestinal side effects such as nausea, sulfur burps, diarrhea, and constipation when increasing the dose. She is instructed to use Zofran at home if needed. She is also advised to contact [facility] in [place] to obtain the 1 mg dose. Once she gets a couple of shots and does well, the dose will be increased to 1.7 mg.    Return in about 3 months (around 3/17/2025). unless patient needs to be seen sooner or acute issues arise.    Code Status: Full.   Patient or patient representative verbalized consent for the use of Ambient Listening during the visit with  YULIANA Wills for chart documentation. 12/17/2024  08:32 CST  I have discussed the patient results/orders and and plan/recommendation with them at today's visit.      Signed by:    YULIANA Wills Date: 12/17/24

## 2024-12-23 ENCOUNTER — CLINICAL SUPPORT (OUTPATIENT)
Dept: INTERNAL MEDICINE | Facility: CLINIC | Age: 34
End: 2024-12-23
Payer: COMMERCIAL

## 2024-12-23 DIAGNOSIS — J01.90 ACUTE NON-RECURRENT SINUSITIS, UNSPECIFIED LOCATION: Primary | ICD-10-CM

## 2024-12-23 PROCEDURE — 96372 THER/PROPH/DIAG INJ SC/IM: CPT | Performed by: NURSE PRACTITIONER

## 2024-12-23 RX ORDER — METHYLPREDNISOLONE SODIUM SUCCINATE 125 MG/2ML
80 INJECTION INTRAMUSCULAR; INTRAVENOUS ONCE
Status: COMPLETED | OUTPATIENT
Start: 2024-12-23 | End: 2024-12-23

## 2024-12-23 RX ADMIN — METHYLPREDNISOLONE SODIUM SUCCINATE 80 MG: 125 INJECTION INTRAMUSCULAR; INTRAVENOUS at 14:46

## 2024-12-23 NOTE — PROGRESS NOTES
Injection  Steroid Injection performed in right vg by Dyana Ortiz MA. Patient tolerated the procedure well without complications.  12/23/24   Dyana Ortiz MA

## 2025-01-14 RX ORDER — SEMAGLUTIDE 1.7 MG/.75ML
1.7 INJECTION, SOLUTION SUBCUTANEOUS WEEKLY
Qty: 3 ML | Refills: 3 | Status: SHIPPED | OUTPATIENT
Start: 2025-01-14

## 2025-01-14 RX ORDER — SEMAGLUTIDE 1 MG/.5ML
1 INJECTION, SOLUTION SUBCUTANEOUS WEEKLY
Qty: 2 ML | Refills: 1 | OUTPATIENT
Start: 2025-01-14

## 2025-01-14 NOTE — TELEPHONE ENCOUNTER
Pt stated she would go up to next dose if you wanted her to, as she was up for whatever. I messaged her back to see if she was tolerating okay but have not heard back.

## 2025-01-14 NOTE — TELEPHONE ENCOUNTER
Rx Refill Note  Requested Prescriptions     Pending Prescriptions Disp Refills    Semaglutide-Weight Management (Wegovy) 1 MG/0.5ML solution auto-injector 2 mL 1     Sig: Inject 0.5 mL under the skin into the appropriate area as directed 1 (One) Time Per Week.      Last office visit with prescribing clinician: 12/17/2024   Last telemedicine visit with prescribing clinician: Visit date not found   Next office visit with prescribing clinician: 3/17/2025                         Would you like a call back once the refill request has been completed: [] Yes [] No    If the office needs to give you a call back, can they leave a voicemail: [] Yes [] No    Janene Acosta RN  01/14/25, 08:18 CST

## 2025-02-07 ENCOUNTER — DOCUMENTATION (OUTPATIENT)
Dept: INTERNAL MEDICINE | Facility: CLINIC | Age: 35
End: 2025-02-07
Payer: COMMERCIAL

## 2025-02-07 NOTE — PROGRESS NOTES
I have reviewed the notes, assessments, and/or procedures performed by Janene Gil RN, I concur with her/his documentation of Adina Harris.

## 2025-03-17 ENCOUNTER — OFFICE VISIT (OUTPATIENT)
Dept: INTERNAL MEDICINE | Facility: CLINIC | Age: 35
End: 2025-03-17
Payer: COMMERCIAL

## 2025-03-17 VITALS
DIASTOLIC BLOOD PRESSURE: 60 MMHG | SYSTOLIC BLOOD PRESSURE: 100 MMHG | TEMPERATURE: 97.8 F | BODY MASS INDEX: 29.8 KG/M2 | WEIGHT: 196.6 LBS | RESPIRATION RATE: 16 BRPM | HEIGHT: 68 IN | HEART RATE: 68 BPM

## 2025-03-17 DIAGNOSIS — E66.811 OBESITY, CLASS I, BMI 30-34.9: Primary | ICD-10-CM

## 2025-03-17 PROCEDURE — 99213 OFFICE O/P EST LOW 20 MIN: CPT | Performed by: NURSE PRACTITIONER

## 2025-03-17 NOTE — PROGRESS NOTES
Answers submitted by the patient for this visit:  Weight Management (Submitted on 3/13/2025)  Chief Complaint: Weight Management  Weight: decreased  Weight change (lbs): 20  Weight loss treatment: prescription medications  Eating habit changes: Reduced appetite  Energy level: unchanged  Physical activity tolerance: stable  Treatment barriers: none, medication cost          Subjective     Chief Complaint   Patient presents with    Obesity       History of Present Illness  The patient is a 34-year-old female who presents for a follow-up on weight management.    She has been on a regimen of semaglutide, specifically Wegovy, at a dosage of 1.7 mg. However, due to financial constraints, she has transitioned to a compounded version of the medication from [facility]. Her current dosage is 2.65 mg of semaglutide, supplemented with 100 mg of levocarnitine. She reports no adverse effects such as constipation, diarrhea, or abdominal pain, and maintains a positive overall well-being.    MEDICATIONS  Current: Semaglutide, levocarnitine.    Otherwise complete ROS reviewed and negative except as mentioned in the HPI.    Past Medical History:   Past Medical History:   Diagnosis Date    Anemia     Chronic kidney disease     stones    Deep vein thrombosis 2022    Depression 2022    Diabetes mellitus     GERD (gastroesophageal reflux disease)     Kidney stone     ESWL in     Migraine     Stress related    Ovarian cyst     Pulmonary embolism 2022     Past Surgical History:  Past Surgical History:   Procedure Laterality Date     SECTION N/A 10/02/2017    Procedure:  SECTION PRIMARY;  Surgeon: Cecily Riggs MD;  Location: [facility] LABOR DELIVERY;  Service:      SECTION N/A 2020    Procedure: REPEAT  SECTION WITHOUT TUBAL LIGATION;  Surgeon: Cecily Riggs MD;  Location: [facility] LABOR DELIVERY;  Service: Obstetrics/Gynecology;  Laterality: N/A;    CLEFT  LIP REPAIR      CLEFT PALATE REPAIR      8 surgeries    COSMETIC SURGERY      Cleft lip repair [id number]    HAND OPEN REDUCTION INTERNAL FIXATION      KNEE ARTHROSCOPY MENISCUS TRANSPLANT Right     KNEE ARTHROSCOPY W/ MENISCAL REPAIR Left     KNEE SURGERY      piece of femur broke off into knee    TOTAL LAPAROSCOPIC HYSTERECTOMY SALPINGECTOMY N/A 7/25/2024    Procedure: TOTAL LAPAROSCOPIC HYSTERECTOMY BILATERAL SALPINGECTOMY WITH DAVINCI ROBOT, CYSTO;  Surgeon: Cesilia Caba MD;  Location: [facility];  Service: Robotics - DaVinci;  Laterality: N/A;     Social History:  reports that she has never smoked. She has never been exposed to tobacco smoke. She has never used smokeless tobacco. She reports that she does not drink alcohol and does not use drugs.    Family History: family history includes Alcohol abuse in her father; Birth defects in her father; Breast cancer in her paternal grandmother; Cancer in her maternal grandfather and paternal grandmother; Depression in her mother; Hypertension in her father; Melanoma in her paternal grandmother; Prostate cancer in her paternal grandfather; Thyroid disease in her mother.       Allergies:  Allergies   Allergen Reactions    Amoxicillin      reaction as a baby, pt told by mother not to take amoxicillin.    Imitrex [Sumatriptan] Nausea And Vomiting and Nausea Only    Penicillins Other (See Comments)     Makes her crazy - mean.      Medications:  Prior to Admission medications    Medication Sig Start Date End Date Taking? Authorizing Provider   azithromycin (Zithromax Z-Riki) 250 MG tablet Take 2 tablets the first day, then 1 tablet daily for 4 days.  Patient not taking: Reported on 12/17/2024 10/3/24   Cesilia Benítez APRN   metFORMIN ER (GLUCOPHAGE-XR) 500 MG 24 hr tablet Take 1 tablet by mouth Daily With Breakfast.  Patient not taking: Reported on 12/17/2024 3/18/24   Cesilia Benítez APRN   methylPREDNISolone (MEDROL) 4 MG dose pack Take as directed  "on package instructions.  Patient not taking: Reported on 12/17/2024 10/3/24   Cesilia Benítez APRN   Semaglutide-Weight Management (Wegovy) 1.7 MG/0.75ML solution auto-injector Inject 0.75 mL under the skin into the appropriate area as directed 1 (One) Time Per Week. 1/14/25   Cesilia Benítez APRN   sertraline (ZOLOFT) 100 MG tablet Take 1 tablet by mouth Daily. 5/21/24   Cesilia Caba MD   topiramate (TOPAMAX) 15 MG capsule (sprinkle) Take 1 capsule by mouth 2 (Two) Times a Day.  Patient not taking: Reported on 12/17/2024 3/18/24   Cesilia Benítez APRN       Objective     Vital Signs: /60   Pulse 68   Temp 97.8 °F (36.6 °C) (Oral)   Resp 16   Ht 172 cm (67.72\")   Wt 89.2 kg (196 lb 9.6 oz)   LMP 06/15/2024 (Exact Date)   BMI 30.14 kg/m²     Physical Exam  Vital Signs  BMI is 30.1.  Physical Exam  Vitals reviewed.   Constitutional:       Appearance: She is well-developed.   HENT:      Head: Normocephalic and atraumatic.   Eyes:      Pupils: Pupils are equal, round, and reactive to light.   Neck:      Vascular: No JVD.   Cardiovascular:      Rate and Rhythm: Normal rate and regular rhythm.   Pulmonary:      Effort: Pulmonary effort is normal.   Abdominal:      General: Bowel sounds are normal.      Palpations: Abdomen is soft.   Musculoskeletal:         General: No deformity.      Cervical back: Normal range of motion and neck supple.   Lymphadenopathy:      Cervical: No cervical adenopathy.   Skin:     General: Skin is warm and dry.   Neurological:      Mental Status: She is alert and oriented to person, place, and time.   Psychiatric:         Behavior: Behavior normal.         Thought Content: Thought content normal.         Judgment: Judgment normal.     Results Reviewed:  Glucose   Date Value Ref Range Status   07/10/2024 87 65 - 99 mg/dL Final     BUN   Date Value Ref Range Status   07/10/2024 12 6 - 20 mg/dL Final     Creatinine   Date Value Ref Range Status "   07/10/2024 0.81 0.57 - 1.00 mg/dL Final     Sodium   Date Value Ref Range Status   07/10/2024 137 136 - 145 mmol/L Final     Potassium   Date Value Ref Range Status   07/10/2024 4.2 3.5 - 5.2 mmol/L Final     Chloride   Date Value Ref Range Status   07/10/2024 105 98 - 107 mmol/L Final     CO2   Date Value Ref Range Status   07/10/2024 25.0 22.0 - 29.0 mmol/L Final     Calcium   Date Value Ref Range Status   07/10/2024 9.1 8.6 - 10.5 mg/dL Final     ALT (SGPT)   Date Value Ref Range Status   12/17/2023 31 0 - 32 IU/L Final   12/13/2022 31 1 - 33 U/L Final     AST (SGOT)   Date Value Ref Range Status   12/17/2023 33 0 - 40 IU/L Final   12/13/2022 37 (H) 1 - 32 U/L Final     WBC   Date Value Ref Range Status   07/10/2024 4.74 3.40 - 10.80 10*3/mm3 Final   05/21/2024 6.4 3.4 - 10.8 x10E3/uL Final   11/22/2022 5.2 4.8 - 10.8 K/uL Final     Hematocrit   Date Value Ref Range Status   07/10/2024 35.7 34.0 - 46.6 % Final   11/22/2022 45.7 37.0 - 47.0 % Final     Platelets   Date Value Ref Range Status   07/10/2024 216 140 - 450 10*3/mm3 Final   11/22/2022 263 130 - 400 K/uL Final     Total Cholesterol   Date Value Ref Range Status   12/12/2022 214 (H) 0 - 200 mg/dL Final     Triglycerides   Date Value Ref Range Status   12/17/2023 130 0 - 149 mg/dL Final   12/12/2022 135 0 - 150 mg/dL Final     HDL Cholesterol   Date Value Ref Range Status   12/17/2023 44 >39 mg/dL Final   12/12/2022 47 40 - 60 mg/dL Final     LDL Chol Calc (NIH)   Date Value Ref Range Status   12/17/2023 102 (H) 0 - 99 mg/dL Final     LDL/HDL Ratio   Date Value Ref Range Status   12/12/2022 2.98  Final     Hemoglobin A1C   Date Value Ref Range Status   09/17/2024 5.5 4.5 - 5.7 % Final   12/12/2022 5.10 4.80 - 5.60 % Final     Results      Assessment / Plan     Assessment/Plan:  Diagnoses and all orders for this visit:    1. Obesity, Class I, BMI 30-34.9 (Primary)         Assessment & Plan  1. Weight management.  Her body mass index (BMI) is currently  30.1, indicating a transition from the obese category. She is currently on a compounded semaglutide regimen of 2.65 mg along with 100 mg of levocarnitine. She reports no issues with eating, drinking, constipation, diarrhea, or abdominal pain. She will continue her current regimen until she meets her deductible. At that point, she will be transitioned back to the prescription strength of semaglutide at 2.4 mg.    Follow-up  The patient will follow up in July for a physical examination.    Return in about 4 months (around 7/17/2025) for Annual physical. unless patient needs to be seen sooner or acute issues arise.    Code Status:   Patient or patient representative verbalized consent for the use of Ambient Listening during the visit with  YULIANA Wills for chart documentation. 3/17/2025  14:30 CDT  I have discussed the patient results/orders and and plan/recommendation with them at today's visit.      Signed by:    YULIANA Wills Date: 03/17/25

## 2025-07-01 RX ORDER — SERTRALINE HYDROCHLORIDE 100 MG/1
100 TABLET, FILM COATED ORAL DAILY
Qty: 90 TABLET | Refills: 0 | Status: SHIPPED | OUTPATIENT
Start: 2025-07-01

## 2025-07-09 RX ORDER — SERTRALINE HYDROCHLORIDE 100 MG/1
100 TABLET, FILM COATED ORAL DAILY
Qty: 90 TABLET | Refills: 0 | OUTPATIENT
Start: 2025-07-09

## 2025-07-18 ENCOUNTER — OFFICE VISIT (OUTPATIENT)
Dept: INTERNAL MEDICINE | Facility: CLINIC | Age: 35
End: 2025-07-18
Payer: COMMERCIAL

## 2025-07-18 VITALS
DIASTOLIC BLOOD PRESSURE: 69 MMHG | HEART RATE: 81 BPM | OXYGEN SATURATION: 100 % | BODY MASS INDEX: 27.83 KG/M2 | WEIGHT: 183.6 LBS | SYSTOLIC BLOOD PRESSURE: 102 MMHG | HEIGHT: 68 IN | TEMPERATURE: 98.2 F

## 2025-07-18 DIAGNOSIS — Z00.00 ROUTINE GENERAL MEDICAL EXAMINATION AT A HEALTH CARE FACILITY: Primary | ICD-10-CM

## 2025-07-18 PROCEDURE — 99395 PREV VISIT EST AGE 18-39: CPT | Performed by: NURSE PRACTITIONER

## 2025-07-18 NOTE — PROGRESS NOTES
Subjective     Chief Complaint   Patient presents with    Annual Exam       History of Present Illness    Pt comes in today for her annual exam. She is overall doing well. Denies any chest pain or shortness. She has lost 45 pounds has been on wegovy in the past. Is doing compounded semaglutide now with b12. Denies any bowel or bladder dysfunction. She had hysterectomy. She continues on zoloft and it is working well.     Otherwise complete ROS reviewed and negative except as mentioned in the HPI.    Past Medical History:   Past Medical History:   Diagnosis Date    Anemia     Chronic kidney disease     stones    Deep vein thrombosis 2022    Depression 2022    GERD (gastroesophageal reflux disease)     Kidney stone     ESWL in 2016    Migraine     Stress related    Ovarian cyst     Pulmonary embolism 2022     Past Surgical History:  Past Surgical History:   Procedure Laterality Date     SECTION N/A 10/02/2017    Procedure:  SECTION PRIMARY;  Surgeon: Cecily Riggs MD;  Location: [facility] LABOR DELIVERY;  Service:      SECTION N/A 2020    Procedure: REPEAT  SECTION WITHOUT TUBAL LIGATION;  Surgeon: Cecily Riggs MD;  Location: [facility] LABOR DELIVERY;  Service: Obstetrics/Gynecology;  Laterality: N/A;    CLEFT LIP REPAIR      CLEFT PALATE REPAIR      8 surgeries    COSMETIC SURGERY      Cleft lip repair [id number]    HAND OPEN REDUCTION INTERNAL FIXATION      KNEE ARTHROSCOPY MENISCUS TRANSPLANT Right     KNEE ARTHROSCOPY W/ MENISCAL REPAIR Left     KNEE SURGERY      piece of femur broke off into knee    TOTAL LAPAROSCOPIC HYSTERECTOMY SALPINGECTOMY N/A 2024    Procedure: TOTAL LAPAROSCOPIC HYSTERECTOMY BILATERAL SALPINGECTOMY WITH DAVINCI ROBOT, CYSTO;  Surgeon: Cesilia Caba MD;  Location: [facility] OR;  Service: Robotics - DaVinci;  Laterality: N/A;     Social History:  reports that she has never smoked. She has never been exposed to  tobacco smoke. She has never used smokeless tobacco. She reports that she does not drink alcohol and does not use drugs.    Family History: family history includes Alcohol abuse in her father; Birth defects in her father; Breast cancer in her paternal grandmother; Cancer in her maternal grandfather and paternal grandmother; Depression in her mother; Hypertension in her father; Melanoma in her paternal grandmother; Prostate cancer in her paternal grandfather; Thyroid disease in her mother.       Allergies:  Allergies   Allergen Reactions    Amoxicillin      reaction as a baby, pt told by mother not to take amoxicillin.    Imitrex [Sumatriptan] Nausea And Vomiting and Nausea Only    Penicillins Other (See Comments)     Makes her crazy - mean.      Medications:  Prior to Admission medications    Medication Sig Start Date End Date Taking? Authorizing Provider   sertraline (ZOLOFT) 100 MG tablet TAKE ONE TABLET BY MOUTH DAILY 7/1/25  Yes Cesilia Caba MD   azithromycin (Zithromax Z-Riki) 250 MG tablet Take 2 tablets the first day, then 1 tablet daily for 4 days.  Patient not taking: Reported on 12/17/2024 10/3/24   Cesilia Benítez APRN   metFORMIN ER (GLUCOPHAGE-XR) 500 MG 24 hr tablet Take 1 tablet by mouth Daily With Breakfast.  Patient not taking: Reported on 12/17/2024 3/18/24   Cesilia Benítez APRN   methylPREDNISolone (MEDROL) 4 MG dose pack Take as directed on package instructions.  Patient not taking: Reported on 12/17/2024 10/3/24   Cesilia Benítez APRN   Semaglutide-Weight Management (Wegovy) 1.7 MG/0.75ML solution auto-injector Inject 0.75 mL under the skin into the appropriate area as directed 1 (One) Time Per Week. 1/14/25   Cesilia Benítez APRN   topiramate (TOPAMAX) 15 MG capsule (sprinkle) Take 1 capsule by mouth 2 (Two) Times a Day.  Patient not taking: Reported on 12/17/2024 3/18/24   Cesilia Benítez APRN       Objective     Vital Signs: /69 (BP  "Location: Left arm, Patient Position: Sitting, Cuff Size: Adult)   Pulse 81   Temp 98.2 °F (36.8 °C) (Temporal)   Ht 172 cm (67.72\")   Wt 83.3 kg (183 lb 9.6 oz)   LMP 06/15/2024 (Exact Date)   SpO2 100%   BMI 28.15 kg/m²     Physical Exam    Physical Exam  Vitals reviewed.   Constitutional:       Appearance: Normal appearance. She is well-developed.   HENT:      Head: Normocephalic and atraumatic.   Eyes:      Pupils: Pupils are equal, round, and reactive to light.   Neck:      Vascular: No JVD.   Cardiovascular:      Rate and Rhythm: Normal rate and regular rhythm.   Pulmonary:      Effort: Pulmonary effort is normal.      Breath sounds: Normal breath sounds.   Abdominal:      General: Bowel sounds are normal.      Palpations: Abdomen is soft.   Musculoskeletal:         General: No deformity.      Cervical back: Normal range of motion and neck supple.   Lymphadenopathy:      Cervical: No cervical adenopathy.   Skin:     General: Skin is warm and dry.   Neurological:      Mental Status: She is alert and oriented to person, place, and time.   Psychiatric:         Behavior: Behavior normal.         Thought Content: Thought content normal.         Judgment: Judgment normal.       Results Reviewed:  Glucose   Date Value Ref Range Status   07/10/2024 87 65 - 99 mg/dL Final     BUN   Date Value Ref Range Status   07/10/2024 12 6 - 20 mg/dL Final     Creatinine   Date Value Ref Range Status   07/10/2024 0.81 0.57 - 1.00 mg/dL Final     Sodium   Date Value Ref Range Status   07/10/2024 137 136 - 145 mmol/L Final     Potassium   Date Value Ref Range Status   07/10/2024 4.2 3.5 - 5.2 mmol/L Final     Chloride   Date Value Ref Range Status   07/10/2024 105 98 - 107 mmol/L Final     CO2   Date Value Ref Range Status   07/10/2024 25.0 22.0 - 29.0 mmol/L Final     Calcium   Date Value Ref Range Status   07/10/2024 9.1 8.6 - 10.5 mg/dL Final     ALT (SGPT)   Date Value Ref Range Status   12/17/2023 31 0 - 32 IU/L Final "   12/13/2022 31 1 - 33 U/L Final     AST (SGOT)   Date Value Ref Range Status   12/17/2023 33 0 - 40 IU/L Final   12/13/2022 37 (H) 1 - 32 U/L Final     WBC   Date Value Ref Range Status   07/10/2024 4.74 3.40 - 10.80 10*3/mm3 Final   05/21/2024 6.4 3.4 - 10.8 x10E3/uL Final   11/22/2022 5.2 4.8 - 10.8 K/uL Final     Hematocrit   Date Value Ref Range Status   07/10/2024 35.7 34.0 - 46.6 % Final   11/22/2022 45.7 37.0 - 47.0 % Final     Platelets   Date Value Ref Range Status   07/10/2024 216 140 - 450 10*3/mm3 Final   11/22/2022 263 130 - 400 K/uL Final     Total Cholesterol   Date Value Ref Range Status   12/12/2022 214 (H) 0 - 200 mg/dL Final     Triglycerides   Date Value Ref Range Status   12/17/2023 130 0 - 149 mg/dL Final   12/12/2022 135 0 - 150 mg/dL Final     HDL Cholesterol   Date Value Ref Range Status   12/17/2023 44 >39 mg/dL Final   12/12/2022 47 40 - 60 mg/dL Final     LDL Chol Calc (NIH)   Date Value Ref Range Status   12/17/2023 102 (H) 0 - 99 mg/dL Final     LDL/HDL Ratio   Date Value Ref Range Status   12/12/2022 2.98  Final     Hemoglobin A1C   Date Value Ref Range Status   09/17/2024 5.5 4.5 - 5.7 % Final   12/12/2022 5.10 4.80 - 5.60 % Final       Results        Assessment / Plan     Assessment/Plan:  Diagnoses and all orders for this visit:    1. Routine general medical examination at a health care facility (Primary)  -     CBC & Differential  -     Comprehensive Metabolic Panel  -     Lipid Panel  -     TSH  -     Vitamin B12  -     Vitamin D,25-Hydroxy  -     Hemoglobin A1c  -     T4, Free  -     T3, Free         Will have lab work here today. Encouraged SBE, pt is aware how to do self breast exam and the importance of same. Discussed weight management and importance of maintaining a healthy weight. Discussed Vitamin D intake and the importance of adequate vitamin D for both Bone Health and a healthy immune system.  Discussed Daily exercise and the importance of same, in regards to a healthy  heart as well as helping to maintain her weight and improving her mental health.  BMI is much improved.    Assessment & Plan      Return in about 1 year (around 7/18/2026) for Annual physical. unless patient needs to be seen sooner or acute issues arise.    Code Status: Full    Patient or patient representative verbalized consent for the use of Ambient Listening during the visit with  YULIANA Wills for chart documentation. 7/18/2025  10:54 CDT    I have discussed the patient results/orders and and plan/recommendation with them at today's visit.      Signed by:    YULIANA Wills Date: 07/18/25

## 2025-07-19 LAB
25(OH)D3+25(OH)D2 SERPL-MCNC: 36.1 NG/ML (ref 30–100)
ALBUMIN SERPL-MCNC: 4.3 G/DL (ref 3.9–4.9)
ALP SERPL-CCNC: 73 IU/L (ref 44–121)
ALT SERPL-CCNC: 19 IU/L (ref 0–32)
AST SERPL-CCNC: 22 IU/L (ref 0–40)
BASOPHILS # BLD AUTO: 0 X10E3/UL (ref 0–0.2)
BASOPHILS NFR BLD AUTO: 0 %
BILIRUB SERPL-MCNC: 0.5 MG/DL (ref 0–1.2)
BUN SERPL-MCNC: 12 MG/DL (ref 6–20)
BUN/CREAT SERPL: 15 (ref 9–23)
CALCIUM SERPL-MCNC: 9.2 MG/DL (ref 8.7–10.2)
CHLORIDE SERPL-SCNC: 103 MMOL/L (ref 96–106)
CHOLEST SERPL-MCNC: 225 MG/DL (ref 100–199)
CO2 SERPL-SCNC: 23 MMOL/L (ref 20–29)
CREAT SERPL-MCNC: 0.8 MG/DL (ref 0.57–1)
EGFRCR SERPLBLD CKD-EPI 2021: 100 ML/MIN/1.73
EOSINOPHIL # BLD AUTO: 0.1 X10E3/UL (ref 0–0.4)
EOSINOPHIL NFR BLD AUTO: 2 %
ERYTHROCYTE [DISTWIDTH] IN BLOOD BY AUTOMATED COUNT: 12.7 % (ref 11.7–15.4)
GLOBULIN SER CALC-MCNC: 2.8 G/DL (ref 1.5–4.5)
GLUCOSE SERPL-MCNC: 82 MG/DL (ref 70–99)
HBA1C MFR BLD: 5.1 % (ref 4.8–5.6)
HCT VFR BLD AUTO: 39.3 % (ref 34–46.6)
HDLC SERPL-MCNC: 56 MG/DL
HGB BLD-MCNC: 12.7 G/DL (ref 11.1–15.9)
IMM GRANULOCYTES # BLD AUTO: 0 X10E3/UL (ref 0–0.1)
IMM GRANULOCYTES NFR BLD AUTO: 0 %
LDLC SERPL CALC-MCNC: 151 MG/DL (ref 0–99)
LYMPHOCYTES # BLD AUTO: 1.5 X10E3/UL (ref 0.7–3.1)
LYMPHOCYTES NFR BLD AUTO: 26 %
MCH RBC QN AUTO: 29.4 PG (ref 26.6–33)
MCHC RBC AUTO-ENTMCNC: 32.3 G/DL (ref 31.5–35.7)
MCV RBC AUTO: 91 FL (ref 79–97)
MONOCYTES # BLD AUTO: 0.3 X10E3/UL (ref 0.1–0.9)
MONOCYTES NFR BLD AUTO: 5 %
NEUTROPHILS # BLD AUTO: 3.8 X10E3/UL (ref 1.4–7)
NEUTROPHILS NFR BLD AUTO: 67 %
PLATELET # BLD AUTO: 236 X10E3/UL (ref 150–450)
POTASSIUM SERPL-SCNC: 4.2 MMOL/L (ref 3.5–5.2)
PROT SERPL-MCNC: 7.1 G/DL (ref 6–8.5)
RBC # BLD AUTO: 4.32 X10E6/UL (ref 3.77–5.28)
SODIUM SERPL-SCNC: 140 MMOL/L (ref 134–144)
T3FREE SERPL-MCNC: 2.5 PG/ML (ref 2–4.4)
T4 FREE SERPL-MCNC: 0.94 NG/DL (ref 0.82–1.77)
TRIGL SERPL-MCNC: 100 MG/DL (ref 0–149)
TSH SERPL DL<=0.005 MIU/L-ACNC: 0.98 UIU/ML (ref 0.45–4.5)
VIT B12 SERPL-MCNC: 763 PG/ML (ref 232–1245)
VLDLC SERPL CALC-MCNC: 18 MG/DL (ref 5–40)
WBC # BLD AUTO: 5.7 X10E3/UL (ref 3.4–10.8)

## 2025-07-21 RX ORDER — ATORVASTATIN CALCIUM 10 MG/1
10 TABLET, FILM COATED ORAL DAILY
Qty: 90 TABLET | Refills: 1 | Status: SHIPPED | OUTPATIENT
Start: 2025-07-21

## (undated) DEVICE — APPL CHLORAPREP W/TINT 26ML ORNG

## (undated) DEVICE — SOLUTION IRRIG 3000ML 0.9% SOD CHL USP UROMATIC PLAS CONT

## (undated) DEVICE — SLV SCD KN ADJ EXPRSS LG

## (undated) DEVICE — SUT VIC 0 CT1 36IN J946H

## (undated) DEVICE — CHLORAPREP 26ML ORANGE

## (undated) DEVICE — GLV SURG SENSICARE W/ALOE PF LF 6.5 STRL

## (undated) DEVICE — Device

## (undated) DEVICE — TUBING, SUCTION, 1/4" X 20', STRAIGHT: Brand: MEDLINE INDUSTRIES, INC.

## (undated) DEVICE — SYS SKIN CLS DERMABOND PRINEO W/22CM MESH TP

## (undated) DEVICE — GLOVE SURG SZ 8 CRM LTX FREE POLYISOPRENE POLYMER BEAD ANTI

## (undated) DEVICE — MANIP UTER ELEVATOR/PRO W/LNG/HNDL CERV/CUP 35MM MD

## (undated) DEVICE — 3M™ STERI-STRIP™ REINFORCED ADHESIVE SKIN CLOSURES, R1547, 1/2 IN X 4 IN (12 MM X 100 MM), 6 STRIPS/ENVELOPE: Brand: 3M™ STERI-STRIP™

## (undated) DEVICE — ANTIBACTERIAL UNDYED BRAIDED (POLYGLACTIN 910), SYNTHETIC ABSORBABLE SUTURE: Brand: COATED VICRYL

## (undated) DEVICE — ADHS LIQ MASTISOL 2/3ML

## (undated) DEVICE — BANDAGE COMPR W3INXL15FT BGE E SGL LAYERED CLP CLSR

## (undated) DEVICE — SUTURE MCRYL SZ 3 0 L18IN ABSRB UD PS 2 3 8 CIR REV CUT NDL MCP497G

## (undated) DEVICE — UNDERGLOVE SURG SZ 8 FNGR THK0.21MIL GRN LTX BEAD CUF

## (undated) DEVICE — GLV SURG TRIUMPH ORTHO W/ALOE PF LTX 7.0

## (undated) DEVICE — 3.5 MM FULL RADIUS ELITE STRAIGHT                                    DISPOSABLE BLADES, BEIGE,PACKAGED 6                                    PER BOX, STERILE

## (undated) DEVICE — PATIENT RETURN ELECTRODE, SINGLE-USE, CONTACT QUALITY MONITORING, ADULT, WITH 9FT CORD, FOR PATIENTS WEIGING OVER 33LBS. (15KG): Brand: MEGADYNE

## (undated) DEVICE — SURGICAL PROCEDURE PACK LOWER EXTREMITY LOURDES HOSP

## (undated) DEVICE — TUBING PMP IRRIG GOFLO

## (undated) DEVICE — SUT VIC 0 UR6 27IN VCP603H

## (undated) DEVICE — BANDAGE COMPR W6INXL12FT SMOOTH FOR LIMB EXSANG ESMARCH

## (undated) DEVICE — PAD C-SECTION: Brand: MEDLINE INDUSTRIES, INC.

## (undated) DEVICE — KT CLN CLEANOR SCPE

## (undated) DEVICE — ARM DRAPE

## (undated) DEVICE — ZIMMER® STERILE DISPOSABLE TOURNIQUET CUFF WITH PLC, DUAL PORT, SINGLE BLADDER, 30 IN. (76 CM)

## (undated) DEVICE — SEAL

## (undated) DEVICE — CLTH CLENS READYCLEANSE PERI CARE PK/5

## (undated) DEVICE — VESSEL SEALER EXTEND: Brand: ENDOWRIST

## (undated) DEVICE — DAVINCI: Brand: MEDLINE INDUSTRIES, INC.

## (undated) DEVICE — STRIP CLS WND CURAD MEDI/STRIP HYPOALLERG 0.25X4IN PK/10

## (undated) DEVICE — BLADELESS OBTURATOR: Brand: WECK VISTA

## (undated) DEVICE — SYRINGE MED 10ML TRNSLUC BRL PLUNG BLK MRK POLYPR CTRL

## (undated) DEVICE — 2, DISPOSABLE SUCTION/IRRIGATOR WITHOUT DISPOSABLE TIP: Brand: STRYKEFLOW

## (undated) DEVICE — DRSNG BRDR MEPILEX P/OP SIL 4X12IN

## (undated) DEVICE — ST TBG AIRSEAL FLTR TRI LUM

## (undated) DEVICE — PK POSTN TRENGUARD450 PROC